# Patient Record
Sex: MALE | Race: WHITE | Employment: OTHER | URBAN - NONMETROPOLITAN AREA
[De-identification: names, ages, dates, MRNs, and addresses within clinical notes are randomized per-mention and may not be internally consistent; named-entity substitution may affect disease eponyms.]

---

## 2017-01-03 ENCOUNTER — OFFICE VISIT (OUTPATIENT)
Dept: FAMILY MEDICINE CLINIC | Age: 67
End: 2017-01-03

## 2017-01-03 VITALS
HEIGHT: 72 IN | WEIGHT: 315 LBS | TEMPERATURE: 98.9 F | SYSTOLIC BLOOD PRESSURE: 128 MMHG | OXYGEN SATURATION: 96 % | HEART RATE: 86 BPM | DIASTOLIC BLOOD PRESSURE: 82 MMHG | BODY MASS INDEX: 42.66 KG/M2

## 2017-01-03 DIAGNOSIS — J06.9 UPPER RESPIRATORY TRACT INFECTION, UNSPECIFIED TYPE: ICD-10-CM

## 2017-01-03 DIAGNOSIS — Z12.11 ENCOUNTER FOR SCREENING FECAL OCCULT BLOOD TESTING: ICD-10-CM

## 2017-01-03 DIAGNOSIS — E66.01 MORBID OBESITY WITH BMI OF 40.0-44.9, ADULT (HCC): ICD-10-CM

## 2017-01-03 DIAGNOSIS — J01.00 ACUTE NON-RECURRENT MAXILLARY SINUSITIS: Primary | ICD-10-CM

## 2017-01-03 LAB
HEMOCCULT STL QL: NEGATIVE

## 2017-01-03 PROCEDURE — 82270 OCCULT BLOOD FECES: CPT | Performed by: FAMILY MEDICINE

## 2017-01-03 PROCEDURE — 99214 OFFICE O/P EST MOD 30 MIN: CPT | Performed by: FAMILY MEDICINE

## 2017-01-03 RX ORDER — BENZONATATE 200 MG/1
200 CAPSULE ORAL 3 TIMES DAILY PRN
Qty: 30 CAPSULE | Refills: 0 | Status: SHIPPED | OUTPATIENT
Start: 2017-01-03 | End: 2017-01-10

## 2017-01-03 RX ORDER — CEFDINIR 300 MG/1
300 CAPSULE ORAL 2 TIMES DAILY
Qty: 20 CAPSULE | Refills: 0 | Status: SHIPPED | OUTPATIENT
Start: 2017-01-03 | End: 2017-03-13 | Stop reason: SDUPTHER

## 2017-01-03 ASSESSMENT — ENCOUNTER SYMPTOMS
VOMITING: 0
SORE THROAT: 1
COUGH: 1
NAUSEA: 1
SINUS PRESSURE: 1

## 2017-01-18 RX ORDER — IBUPROFEN 800 MG/1
800 TABLET ORAL 3 TIMES DAILY
Qty: 120 TABLET | Refills: 1 | Status: SHIPPED | OUTPATIENT
Start: 2017-01-18 | End: 2017-08-29 | Stop reason: SDUPTHER

## 2017-03-13 ENCOUNTER — OFFICE VISIT (OUTPATIENT)
Dept: FAMILY MEDICINE CLINIC | Age: 67
End: 2017-03-13

## 2017-03-13 VITALS
OXYGEN SATURATION: 98 % | BODY MASS INDEX: 41.88 KG/M2 | WEIGHT: 309.2 LBS | HEART RATE: 86 BPM | DIASTOLIC BLOOD PRESSURE: 82 MMHG | SYSTOLIC BLOOD PRESSURE: 138 MMHG | HEIGHT: 72 IN | TEMPERATURE: 98.5 F

## 2017-03-13 DIAGNOSIS — G89.29 CHRONIC MIDLINE LOW BACK PAIN WITHOUT SCIATICA: ICD-10-CM

## 2017-03-13 DIAGNOSIS — J01.00 ACUTE NON-RECURRENT MAXILLARY SINUSITIS: Primary | ICD-10-CM

## 2017-03-13 DIAGNOSIS — M54.50 CHRONIC MIDLINE LOW BACK PAIN WITHOUT SCIATICA: ICD-10-CM

## 2017-03-13 DIAGNOSIS — R07.89 RIGHT-SIDED CHEST WALL PAIN: ICD-10-CM

## 2017-03-13 PROCEDURE — G8427 DOCREV CUR MEDS BY ELIG CLIN: HCPCS | Performed by: FAMILY MEDICINE

## 2017-03-13 PROCEDURE — G8484 FLU IMMUNIZE NO ADMIN: HCPCS | Performed by: FAMILY MEDICINE

## 2017-03-13 PROCEDURE — 1036F TOBACCO NON-USER: CPT | Performed by: FAMILY MEDICINE

## 2017-03-13 PROCEDURE — G8417 CALC BMI ABV UP PARAM F/U: HCPCS | Performed by: FAMILY MEDICINE

## 2017-03-13 PROCEDURE — 99214 OFFICE O/P EST MOD 30 MIN: CPT | Performed by: FAMILY MEDICINE

## 2017-03-13 PROCEDURE — 3017F COLORECTAL CA SCREEN DOC REV: CPT | Performed by: FAMILY MEDICINE

## 2017-03-13 PROCEDURE — 4040F PNEUMOC VAC/ADMIN/RCVD: CPT | Performed by: FAMILY MEDICINE

## 2017-03-13 PROCEDURE — 1123F ACP DISCUSS/DSCN MKR DOCD: CPT | Performed by: FAMILY MEDICINE

## 2017-03-13 RX ORDER — CEFDINIR 300 MG/1
300 CAPSULE ORAL 2 TIMES DAILY
Qty: 20 CAPSULE | Refills: 0 | Status: SHIPPED | OUTPATIENT
Start: 2017-03-13 | End: 2017-03-23

## 2017-03-13 RX ORDER — HYDROCODONE BITARTRATE AND ACETAMINOPHEN 7.5; 325 MG/1; MG/1
1 TABLET ORAL EVERY 6 HOURS PRN
Qty: 30 TABLET | Refills: 0 | Status: SHIPPED | OUTPATIENT
Start: 2017-03-13 | End: 2017-08-29 | Stop reason: SDUPTHER

## 2017-03-13 RX ORDER — DEXTROMETHORPHAN HYDROBROMIDE AND PROMETHAZINE HYDROCHLORIDE 15; 6.25 MG/5ML; MG/5ML
5 SYRUP ORAL 4 TIMES DAILY PRN
Qty: 120 ML | Refills: 0 | Status: SHIPPED | OUTPATIENT
Start: 2017-03-13 | End: 2017-03-20

## 2017-03-13 ASSESSMENT — ENCOUNTER SYMPTOMS
NAUSEA: 1
COUGH: 1
SORE THROAT: 1
SHORTNESS OF BREATH: 1
DIARRHEA: 1

## 2017-08-29 ENCOUNTER — OFFICE VISIT (OUTPATIENT)
Dept: FAMILY MEDICINE CLINIC | Age: 67
End: 2017-08-29

## 2017-08-29 VITALS
OXYGEN SATURATION: 96 % | HEIGHT: 72 IN | HEART RATE: 65 BPM | DIASTOLIC BLOOD PRESSURE: 80 MMHG | WEIGHT: 315 LBS | SYSTOLIC BLOOD PRESSURE: 120 MMHG | BODY MASS INDEX: 42.66 KG/M2

## 2017-08-29 DIAGNOSIS — M25.552 LEFT HIP PAIN: ICD-10-CM

## 2017-08-29 DIAGNOSIS — M79.605 LEFT LEG PAIN: ICD-10-CM

## 2017-08-29 DIAGNOSIS — G89.29 CHRONIC MIDLINE LOW BACK PAIN WITHOUT SCIATICA: ICD-10-CM

## 2017-08-29 DIAGNOSIS — M25.562 LEFT KNEE PAIN, UNSPECIFIED CHRONICITY: Primary | ICD-10-CM

## 2017-08-29 DIAGNOSIS — M54.50 CHRONIC MIDLINE LOW BACK PAIN WITHOUT SCIATICA: ICD-10-CM

## 2017-08-29 PROCEDURE — 1123F ACP DISCUSS/DSCN MKR DOCD: CPT | Performed by: FAMILY MEDICINE

## 2017-08-29 PROCEDURE — G8417 CALC BMI ABV UP PARAM F/U: HCPCS | Performed by: FAMILY MEDICINE

## 2017-08-29 PROCEDURE — 4040F PNEUMOC VAC/ADMIN/RCVD: CPT | Performed by: FAMILY MEDICINE

## 2017-08-29 PROCEDURE — 1036F TOBACCO NON-USER: CPT | Performed by: FAMILY MEDICINE

## 2017-08-29 PROCEDURE — 99213 OFFICE O/P EST LOW 20 MIN: CPT | Performed by: FAMILY MEDICINE

## 2017-08-29 PROCEDURE — 3017F COLORECTAL CA SCREEN DOC REV: CPT | Performed by: FAMILY MEDICINE

## 2017-08-29 PROCEDURE — G8427 DOCREV CUR MEDS BY ELIG CLIN: HCPCS | Performed by: FAMILY MEDICINE

## 2017-08-29 RX ORDER — IBUPROFEN 800 MG/1
800 TABLET ORAL 3 TIMES DAILY
Qty: 270 TABLET | Refills: 1 | Status: SHIPPED | OUTPATIENT
Start: 2017-08-29 | End: 2018-09-19

## 2017-08-29 RX ORDER — HYDROCODONE BITARTRATE AND ACETAMINOPHEN 7.5; 325 MG/1; MG/1
1 TABLET ORAL EVERY 6 HOURS PRN
Qty: 30 TABLET | Refills: 0 | Status: SHIPPED | OUTPATIENT
Start: 2017-08-29 | End: 2017-09-05

## 2017-08-29 ASSESSMENT — ENCOUNTER SYMPTOMS: SHORTNESS OF BREATH: 0

## 2017-09-13 DIAGNOSIS — R59.9 ENLARGED LYMPH NODE: Primary | ICD-10-CM

## 2017-09-15 DIAGNOSIS — R59.9 ENLARGEMENT OF LYMPH NODES: Primary | ICD-10-CM

## 2017-09-20 LAB
ANION GAP SERPL CALCULATED.3IONS-SCNC: 12.1 MMOL/L (ref 8–16)
BUN BLDV-MCNC: 19 MG/DL (ref 5–19)
CALCIUM SERPL-MCNC: 8.8 MG/DL (ref 8.4–10.2)
CHLORIDE BLD-SCNC: 108 MMOL/L (ref 99–111)
CO2: 30 MMOL/L (ref 21–33)
CREAT SERPL-MCNC: 1 MG/DL (ref 0.6–1.3)
GFR CALCULATED: 74
GLUCOSE BLD-MCNC: 109 MG/DL (ref 74–99)
POTASSIUM SERPL-SCNC: 4.9 MMOL/L (ref 3.4–5)
SODIUM BLD-SCNC: 145 MMOL/L (ref 136–146)

## 2017-09-21 DIAGNOSIS — R59.9 ENLARGED LYMPH NODE: Primary | ICD-10-CM

## 2018-01-02 ENCOUNTER — TELEPHONE (OUTPATIENT)
Dept: FAMILY MEDICINE CLINIC | Age: 68
End: 2018-01-02

## 2018-01-02 DIAGNOSIS — R05.9 COUGH IN ADULT: Primary | ICD-10-CM

## 2018-01-02 RX ORDER — BENZONATATE 200 MG/1
200 CAPSULE ORAL 3 TIMES DAILY PRN
Qty: 30 CAPSULE | Refills: 0 | Status: SHIPPED | OUTPATIENT
Start: 2018-01-02 | End: 2018-01-09

## 2018-01-02 RX ORDER — CEFDINIR 300 MG/1
300 CAPSULE ORAL 2 TIMES DAILY
Qty: 20 CAPSULE | Refills: 0 | Status: SHIPPED | OUTPATIENT
Start: 2018-01-02 | End: 2018-01-12

## 2018-01-02 NOTE — TELEPHONE ENCOUNTER
Pt wife calling stated pt w/ st, cough, congestion, rattle in chest and some sob x3 days, no fever, wanting to know if something can be sent to [facility]

## 2018-01-08 ENCOUNTER — OFFICE VISIT (OUTPATIENT)
Dept: FAMILY MEDICINE CLINIC | Age: 68
End: 2018-01-08

## 2018-01-08 VITALS
DIASTOLIC BLOOD PRESSURE: 80 MMHG | HEART RATE: 84 BPM | TEMPERATURE: 97.6 F | HEIGHT: 72 IN | WEIGHT: 315 LBS | SYSTOLIC BLOOD PRESSURE: 138 MMHG | OXYGEN SATURATION: 96 % | BODY MASS INDEX: 42.66 KG/M2

## 2018-01-08 DIAGNOSIS — R07.81 RIB PAIN ON LEFT SIDE: ICD-10-CM

## 2018-01-08 DIAGNOSIS — J40 BRONCHITIS: Primary | ICD-10-CM

## 2018-01-08 PROCEDURE — 99214 OFFICE O/P EST MOD 30 MIN: CPT | Performed by: FAMILY MEDICINE

## 2018-01-08 RX ORDER — AZITHROMYCIN 250 MG/1
TABLET, FILM COATED ORAL
Qty: 1 PACKET | Refills: 0 | Status: SHIPPED | OUTPATIENT
Start: 2018-01-08 | End: 2018-01-18

## 2018-01-08 RX ORDER — DEXTROMETHORPHAN HYDROBROMIDE AND PROMETHAZINE HYDROCHLORIDE 15; 6.25 MG/5ML; MG/5ML
5 SYRUP ORAL 4 TIMES DAILY PRN
Qty: 120 ML | Refills: 0 | Status: SHIPPED | OUTPATIENT
Start: 2018-01-08 | End: 2018-01-15

## 2018-01-08 RX ORDER — PREDNISONE 20 MG/1
40 TABLET ORAL DAILY
Qty: 10 TABLET | Refills: 0 | Status: SHIPPED | OUTPATIENT
Start: 2018-01-08 | End: 2018-01-13

## 2018-01-08 RX ORDER — IBUPROFEN 800 MG/1
800 TABLET ORAL 3 TIMES DAILY
Qty: 270 TABLET | Refills: 1 | Status: CANCELLED | OUTPATIENT
Start: 2018-01-08 | End: 2018-04-08

## 2018-01-08 ASSESSMENT — ENCOUNTER SYMPTOMS
WHEEZING: 1
SHORTNESS OF BREATH: 1
COUGH: 1

## 2018-01-08 NOTE — PROGRESS NOTES
Subjective:      Patient ID: Ani Agee is a 69 y.o. male. Chief Complaint   Patient presents with    Cough    Congestion       Cough   This is a new problem. The current episode started 1 to 4 weeks ago. The problem has been unchanged. The cough is productive of sputum. Associated symptoms include nasal congestion, postnasal drip, shortness of breath and wheezing. Pertinent negatives include no fever (no recent fevers). The symptoms are aggravated by lying down. He has tried prescription cough suppressant for the symptoms. The treatment provided mild relief. has been on omnicef. Not better. No sig relief w/ cough pearles. Review of Systems   Constitutional: Negative for fever (no recent fevers). HENT: Positive for congestion and postnasal drip. Respiratory: Positive for cough, shortness of breath and wheezing. Objective:   Physical Exam   Constitutional: He is oriented to person, place, and time. He appears well-developed and well-nourished. HENT:   Head: Normocephalic and atraumatic. Right Ear: Hearing normal.   Left Ear: Hearing normal.   Mouth/Throat: Uvula is midline, oropharynx is clear and moist and mucous membranes are normal.   Eyes: EOM are normal.   Cardiovascular: Normal rate and regular rhythm. Exam reveals no gallop and no friction rub. No murmur heard. Pulmonary/Chest: Effort normal. No respiratory distress. He has wheezes. Abdominal: Soft. There is no tenderness. Lymphadenopathy:     He has no cervical adenopathy. Neurological: He is alert and oriented to person, place, and time. Skin: Skin is warm and dry. No rash noted. Psychiatric: He has a normal mood and affect. /80   Pulse 84   Temp 97.6 °F (36.4 °C) (Tympanic)   Ht 6' (1.829 m)   Wt (!) 324 lb 3.2 oz (147.1 kg)   SpO2 96%   BMI 43.97 kg/m²    Assessment/Plan   1. Bronchitis  Ongoing issues. Add meds as below to help w/ bronchospasm and cover for atypical bacteria.   If not

## 2018-05-24 DIAGNOSIS — Z12.11 SCREEN FOR COLON CANCER: Primary | ICD-10-CM

## 2018-06-07 ENCOUNTER — NURSE ONLY (OUTPATIENT)
Dept: FAMILY MEDICINE CLINIC | Age: 68
End: 2018-06-07

## 2018-06-07 DIAGNOSIS — Z12.11 SCREEN FOR COLON CANCER: ICD-10-CM

## 2018-06-07 LAB
CONTROL: PRESENT
HEMOCCULT STL QL: POSITIVE

## 2018-06-07 PROCEDURE — 82274 ASSAY TEST FOR BLOOD FECAL: CPT | Performed by: FAMILY MEDICINE

## 2018-06-08 DIAGNOSIS — Z12.11 ENCOUNTER FOR SCREENING COLONOSCOPY: Primary | ICD-10-CM

## 2018-06-18 ENCOUNTER — OFFICE VISIT (OUTPATIENT)
Dept: FAMILY MEDICINE CLINIC | Age: 68
End: 2018-06-18

## 2018-06-18 VITALS
DIASTOLIC BLOOD PRESSURE: 76 MMHG | HEIGHT: 72 IN | BODY MASS INDEX: 42.66 KG/M2 | SYSTOLIC BLOOD PRESSURE: 132 MMHG | HEART RATE: 95 BPM | OXYGEN SATURATION: 96 % | WEIGHT: 315 LBS

## 2018-06-18 DIAGNOSIS — N48.1 BALANITIS: Primary | ICD-10-CM

## 2018-06-18 DIAGNOSIS — N48.9 PENILE LESION: ICD-10-CM

## 2018-06-18 PROCEDURE — 99213 OFFICE O/P EST LOW 20 MIN: CPT | Performed by: FAMILY MEDICINE

## 2018-06-18 RX ORDER — KETOCONAZOLE 20 MG/G
CREAM TOPICAL
Qty: 30 G | Refills: 1 | Status: SHIPPED | OUTPATIENT
Start: 2018-06-18 | End: 2018-07-30

## 2018-06-18 ASSESSMENT — ENCOUNTER SYMPTOMS
CHEST TIGHTNESS: 0
CONSTIPATION: 0
DIARRHEA: 0
SHORTNESS OF BREATH: 1

## 2018-06-25 ENCOUNTER — OFFICE VISIT (OUTPATIENT)
Dept: FAMILY MEDICINE CLINIC | Age: 68
End: 2018-06-25

## 2018-06-25 VITALS
OXYGEN SATURATION: 96 % | WEIGHT: 315 LBS | SYSTOLIC BLOOD PRESSURE: 122 MMHG | DIASTOLIC BLOOD PRESSURE: 80 MMHG | BODY MASS INDEX: 42.66 KG/M2 | HEART RATE: 114 BPM | HEIGHT: 72 IN

## 2018-06-25 DIAGNOSIS — N48.9 PENILE LESION: ICD-10-CM

## 2018-06-25 DIAGNOSIS — I48.91 ATRIAL FIBRILLATION, UNSPECIFIED TYPE (HCC): ICD-10-CM

## 2018-06-25 DIAGNOSIS — G44.89 OTHER HEADACHE SYNDROME: ICD-10-CM

## 2018-06-25 DIAGNOSIS — I49.9 IRREGULAR HEART RHYTHM: Primary | ICD-10-CM

## 2018-06-25 DIAGNOSIS — G47.33 OBSTRUCTIVE SLEEP APNEA SYNDROME: ICD-10-CM

## 2018-06-25 LAB
A/G RATIO: 1.5 (ref 1.1–2.2)
ALBUMIN SERPL-MCNC: 4 G/DL (ref 3.4–5)
ALP BLD-CCNC: 76 U/L (ref 40–129)
ALT SERPL-CCNC: 30 U/L (ref 10–40)
ANION GAP SERPL CALCULATED.3IONS-SCNC: 17 MMOL/L (ref 3–16)
AST SERPL-CCNC: 22 U/L (ref 15–37)
BASOPHILS ABSOLUTE: 0 K/UL (ref 0–0.2)
BASOPHILS RELATIVE PERCENT: 0.7 %
BILIRUB SERPL-MCNC: 0.6 MG/DL (ref 0–1)
BUN BLDV-MCNC: 18 MG/DL (ref 7–20)
CALCIUM SERPL-MCNC: 8.6 MG/DL (ref 8.3–10.6)
CHLORIDE BLD-SCNC: 103 MMOL/L (ref 99–110)
CO2: 27 MMOL/L (ref 21–32)
CREAT SERPL-MCNC: 0.9 MG/DL (ref 0.8–1.3)
EOSINOPHILS ABSOLUTE: 0.1 K/UL (ref 0–0.6)
EOSINOPHILS RELATIVE PERCENT: 2 %
GFR AFRICAN AMERICAN: >60
GFR NON-AFRICAN AMERICAN: >60
GLOBULIN: 2.7 G/DL
GLUCOSE BLD-MCNC: 101 MG/DL (ref 70–99)
HCT VFR BLD CALC: 41.7 % (ref 40.5–52.5)
HEMOGLOBIN: 14.4 G/DL (ref 13.5–17.5)
LYMPHOCYTES ABSOLUTE: 1.6 K/UL (ref 1–5.1)
LYMPHOCYTES RELATIVE PERCENT: 26.1 %
MCH RBC QN AUTO: 31.2 PG (ref 26–34)
MCHC RBC AUTO-ENTMCNC: 34.6 G/DL (ref 31–36)
MCV RBC AUTO: 90.3 FL (ref 80–100)
MONOCYTES ABSOLUTE: 0.4 K/UL (ref 0–1.3)
MONOCYTES RELATIVE PERCENT: 5.8 %
NEUTROPHILS ABSOLUTE: 4 K/UL (ref 1.7–7.7)
NEUTROPHILS RELATIVE PERCENT: 65.4 %
PDW BLD-RTO: 15 % (ref 12.4–15.4)
PLATELET # BLD: 179 K/UL (ref 135–450)
PMV BLD AUTO: 7.6 FL (ref 5–10.5)
POTASSIUM SERPL-SCNC: 3.7 MMOL/L (ref 3.5–5.1)
RBC # BLD: 4.61 M/UL (ref 4.2–5.9)
SODIUM BLD-SCNC: 147 MMOL/L (ref 136–145)
T4 FREE: 1.3 NG/DL (ref 0.9–1.8)
TOTAL PROTEIN: 6.7 G/DL (ref 6.4–8.2)
TSH SERPL DL<=0.05 MIU/L-ACNC: 0.71 UIU/ML (ref 0.27–4.2)
WBC # BLD: 6.1 K/UL (ref 4–11)

## 2018-06-25 PROCEDURE — 93000 ELECTROCARDIOGRAM COMPLETE: CPT | Performed by: FAMILY MEDICINE

## 2018-06-25 PROCEDURE — 99213 OFFICE O/P EST LOW 20 MIN: CPT | Performed by: FAMILY MEDICINE

## 2018-06-25 PROCEDURE — 36415 COLL VENOUS BLD VENIPUNCTURE: CPT | Performed by: FAMILY MEDICINE

## 2018-06-25 ASSESSMENT — ENCOUNTER SYMPTOMS
DIARRHEA: 0
SHORTNESS OF BREATH: 0
BACK PAIN: 1
NAUSEA: 1
VOMITING: 0

## 2018-06-27 ENCOUNTER — OFFICE VISIT (OUTPATIENT)
Dept: CARDIOLOGY CLINIC | Age: 68
End: 2018-06-27

## 2018-06-27 VITALS
DIASTOLIC BLOOD PRESSURE: 74 MMHG | BODY MASS INDEX: 43.4 KG/M2 | HEART RATE: 87 BPM | WEIGHT: 315 LBS | OXYGEN SATURATION: 96 % | SYSTOLIC BLOOD PRESSURE: 126 MMHG

## 2018-06-27 DIAGNOSIS — I48.0 PAROXYSMAL ATRIAL FIBRILLATION (HCC): Primary | ICD-10-CM

## 2018-06-27 PROCEDURE — 99204 OFFICE O/P NEW MOD 45 MIN: CPT | Performed by: INTERNAL MEDICINE

## 2018-06-28 ENCOUNTER — TELEPHONE (OUTPATIENT)
Dept: CARDIOLOGY CLINIC | Age: 68
End: 2018-06-28

## 2018-07-17 ENCOUNTER — HOSPITAL ENCOUNTER (OUTPATIENT)
Dept: NON INVASIVE DIAGNOSTICS | Age: 68
Discharge: HOME | End: 2018-07-17
Payer: COMMERCIAL

## 2018-07-17 LAB
LV EF: 53 %
LVEF MODALITY: NORMAL

## 2018-07-17 PROCEDURE — 93306 TTE W/DOPPLER COMPLETE: CPT

## 2018-07-17 PROCEDURE — 93350 STRESS TTE ONLY: CPT

## 2018-07-30 ENCOUNTER — HOSPITAL ENCOUNTER (EMERGENCY)
Age: 68
Discharge: HOME | End: 2018-07-30
Attending: EMERGENCY MEDICINE
Payer: COMMERCIAL

## 2018-07-30 ENCOUNTER — APPOINTMENT (OUTPATIENT)
Dept: CT IMAGING | Age: 68
End: 2018-07-30
Payer: COMMERCIAL

## 2018-07-30 VITALS
SYSTOLIC BLOOD PRESSURE: 133 MMHG | HEIGHT: 72 IN | WEIGHT: 315 LBS | RESPIRATION RATE: 16 BRPM | HEART RATE: 78 BPM | BODY MASS INDEX: 42.66 KG/M2 | OXYGEN SATURATION: 98 % | DIASTOLIC BLOOD PRESSURE: 83 MMHG | TEMPERATURE: 98.1 F

## 2018-07-30 DIAGNOSIS — N23 RENAL COLIC: ICD-10-CM

## 2018-07-30 DIAGNOSIS — N20.0 KIDNEY STONE: ICD-10-CM

## 2018-07-30 DIAGNOSIS — N13.2 URETERAL STONE WITH HYDRONEPHROSIS: Primary | ICD-10-CM

## 2018-07-30 LAB
A/G RATIO: 1.3 (ref 1.1–2.2)
ALBUMIN SERPL-MCNC: 3.9 G/DL (ref 3.4–5)
ALP BLD-CCNC: 65 U/L (ref 40–129)
ALT SERPL-CCNC: 39 U/L (ref 10–40)
ANION GAP SERPL CALCULATED.3IONS-SCNC: 10 MMOL/L (ref 3–16)
AST SERPL-CCNC: 29 U/L (ref 15–37)
BASOPHILS ABSOLUTE: 0 K/UL (ref 0–0.2)
BASOPHILS RELATIVE PERCENT: 0.5 %
BILIRUB SERPL-MCNC: 0.6 MG/DL (ref 0–1)
BILIRUBIN URINE: NEGATIVE
BLOOD, URINE: ABNORMAL
BUN BLDV-MCNC: 14 MG/DL (ref 7–20)
CALCIUM SERPL-MCNC: 8.8 MG/DL (ref 8.3–10.6)
CHLORIDE BLD-SCNC: 109 MMOL/L (ref 99–110)
CLARITY: CLEAR
CO2: 23 MMOL/L (ref 21–32)
COLOR: YELLOW
CREAT SERPL-MCNC: 0.8 MG/DL (ref 0.8–1.3)
EOSINOPHILS ABSOLUTE: 0.1 K/UL (ref 0–0.6)
EOSINOPHILS RELATIVE PERCENT: 1 %
GFR AFRICAN AMERICAN: >60
GFR NON-AFRICAN AMERICAN: >60
GLOBULIN: 3 G/DL
GLUCOSE BLD-MCNC: 103 MG/DL (ref 70–99)
GLUCOSE URINE: NEGATIVE MG/DL
HCT VFR BLD CALC: 40.7 % (ref 40.5–52.5)
HEMOGLOBIN: 14.1 G/DL (ref 13.5–17.5)
INR BLD: 1.37 (ref 0.86–1.14)
KETONES, URINE: NEGATIVE MG/DL
LEUKOCYTE ESTERASE, URINE: NEGATIVE
LYMPHOCYTES ABSOLUTE: 1.6 K/UL (ref 1–5.1)
LYMPHOCYTES RELATIVE PERCENT: 23.9 %
MCH RBC QN AUTO: 31.4 PG (ref 26–34)
MCHC RBC AUTO-ENTMCNC: 34.7 G/DL (ref 31–36)
MCV RBC AUTO: 90.5 FL (ref 80–100)
MICROSCOPIC EXAMINATION: YES
MONOCYTES ABSOLUTE: 0.5 K/UL (ref 0–1.3)
MONOCYTES RELATIVE PERCENT: 6.8 %
NEUTROPHILS ABSOLUTE: 4.7 K/UL (ref 1.7–7.7)
NEUTROPHILS RELATIVE PERCENT: 67.8 %
NITRITE, URINE: NEGATIVE
PDW BLD-RTO: 15.1 % (ref 12.4–15.4)
PH UA: 6
PLATELET # BLD: 144 K/UL (ref 135–450)
PMV BLD AUTO: 6.5 FL (ref 5–10.5)
POTASSIUM SERPL-SCNC: 3.8 MMOL/L (ref 3.5–5.1)
PROTEIN UA: NEGATIVE MG/DL
PROTHROMBIN TIME: 15.6 SEC (ref 9.8–13)
RBC # BLD: 4.5 M/UL (ref 4.2–5.9)
RBC UA: ABNORMAL /HPF (ref 0–2)
SODIUM BLD-SCNC: 142 MMOL/L (ref 136–145)
SPECIFIC GRAVITY UA: 1.01
TOTAL PROTEIN: 6.9 G/DL (ref 6.4–8.2)
URINE REFLEX TO CULTURE: ABNORMAL
URINE TYPE: ABNORMAL
UROBILINOGEN, URINE: 0.2 E.U./DL
WBC # BLD: 6.9 K/UL (ref 4–11)
WBC UA: ABNORMAL /HPF (ref 0–5)

## 2018-07-30 PROCEDURE — 85610 PROTHROMBIN TIME: CPT

## 2018-07-30 PROCEDURE — 85025 COMPLETE CBC W/AUTO DIFF WBC: CPT

## 2018-07-30 PROCEDURE — 81001 URINALYSIS AUTO W/SCOPE: CPT

## 2018-07-30 PROCEDURE — 74176 CT ABD & PELVIS W/O CONTRAST: CPT

## 2018-07-30 PROCEDURE — 99284 EMERGENCY DEPT VISIT MOD MDM: CPT

## 2018-07-30 PROCEDURE — 80053 COMPREHEN METABOLIC PANEL: CPT

## 2018-07-30 RX ORDER — ONDANSETRON 4 MG/1
4 TABLET, FILM COATED ORAL EVERY 8 HOURS PRN
Qty: 20 TABLET | Refills: 0 | Status: SHIPPED | OUTPATIENT
Start: 2018-07-30 | End: 2018-08-16

## 2018-07-30 RX ORDER — HYDROCODONE BITARTRATE AND ACETAMINOPHEN 5; 325 MG/1; MG/1
1 TABLET ORAL EVERY 6 HOURS PRN
Qty: 10 TABLET | Refills: 0 | Status: SHIPPED | OUTPATIENT
Start: 2018-07-30 | End: 2018-08-06

## 2018-07-30 RX ORDER — TAMSULOSIN HYDROCHLORIDE 0.4 MG/1
0.4 CAPSULE ORAL DAILY
Qty: 5 CAPSULE | Refills: 0 | Status: SHIPPED | OUTPATIENT
Start: 2018-07-30 | End: 2018-09-19

## 2018-07-30 ASSESSMENT — ENCOUNTER SYMPTOMS
SORE THROAT: 0
FACIAL SWELLING: 0
RHINORRHEA: 0
NAUSEA: 1
SHORTNESS OF BREATH: 0
BACK PAIN: 0
EYE REDNESS: 0
DIARRHEA: 0
ABDOMINAL PAIN: 0
COUGH: 0
EYE PAIN: 0
CONSTIPATION: 0
CHEST TIGHTNESS: 0

## 2018-07-30 ASSESSMENT — PAIN DESCRIPTION - LOCATION: LOCATION: FLANK

## 2018-07-30 ASSESSMENT — PAIN SCALES - GENERAL: PAINLEVEL_OUTOF10: 2

## 2018-07-30 ASSESSMENT — PAIN DESCRIPTION - ORIENTATION: ORIENTATION: LEFT

## 2018-07-30 ASSESSMENT — PAIN DESCRIPTION - PAIN TYPE: TYPE: ACUTE PAIN

## 2018-07-30 NOTE — ED PROVIDER NOTES
pain and hematuria. At presentation, vital signs were stable. The patient was in no acute distress. Physical Exam findings were as noted above. Labs were drawn which showed no acute abnormalities. CT images of abdomen and pelvis were ordered and interpreted by radiology. This showed A 3 mm obstructing ureteral stone with mild hydronephrosis. I discussed these results with the patient and he/she understood    He was offered pain management and nausea management however, at the time of interview he was not in any acute pain or nausea. Therefore, the patient declined. Urinalysis revealed large red blood without concern for infection    I sent the patient that he will likely pass the stone without complications. His pain is under control at this time and therefore I believe he is a good candidate to be discharged home. This patient will be discharged home with the medications listed below. I counseled on how to take these medicines and risks/benefits and AEs. The patient was agreeable to the prescriptions. He/She will follow up with primary care provider or present back for worsening symptoms. I estimate there is LOW risk for ACUTE APPENDICITIS, BOWEL OBSTRUCTION, CHOLECYSTITIS, DIVERTICULITIS, INCARCERATED HERNIA, PANCREATITIS, PERFORATED BOWEL, BOWEL ISCHEMIA, GONADAL TORSION, OR CARDIAC ISCHEMIA, thus I consider the discharge disposition reasonable. Also, there is no evidence or peritonitis, sepsis, or toxicity. I discussed this patient with the attending physician. They performed an independent exam of the the patient and agreed with my assessment and plan. The patient tolerated their visit well. They were seen and evaluated by the attending physician who agreed with the assessment and plan. The patient and / or the family were informed of the results of any tests, a time was given to answer questions, a plan was proposed and they agreed with plan.         FINAL IMPRESSION

## 2018-07-31 ENCOUNTER — OFFICE VISIT (OUTPATIENT)
Dept: CARDIOLOGY CLINIC | Age: 68
End: 2018-07-31

## 2018-07-31 VITALS
OXYGEN SATURATION: 95 % | HEART RATE: 102 BPM | DIASTOLIC BLOOD PRESSURE: 86 MMHG | BODY MASS INDEX: 43.54 KG/M2 | WEIGHT: 315 LBS | SYSTOLIC BLOOD PRESSURE: 138 MMHG

## 2018-07-31 DIAGNOSIS — I48.19 PERSISTENT ATRIAL FIBRILLATION (HCC): Primary | ICD-10-CM

## 2018-07-31 PROCEDURE — 99214 OFFICE O/P EST MOD 30 MIN: CPT | Performed by: NURSE PRACTITIONER

## 2018-07-31 NOTE — PROGRESS NOTES
[facility]   Electrophysiology  Date: 7/31/2018    Chief Complaint   Patient presents with    Atrial Fibrillation       Cardiac HX: Pratik Carranza is a 77 y.o. man with a h/o SAM, obesity, new onset AFin the presence of food poisoning with diarrhea and ongoing fatigue. Since that time he has noticed ongoing fatigue, no energy, activity limitations. He has no appetite and his weight has gone up a couple of pounds. Today: Patient is here for follow-up. He remains in AF. HR controlled in the 90's. Still remains with SOB, fatigue, activity intolerance. He is dealing with a urolithiasis at this time. Home medications:   Current Outpatient Prescriptions on File Prior to Visit   Medication Sig Dispense Refill    HYDROcodone-acetaminophen (NORCO) 5-325 MG per tablet Take 1 tablet by mouth every 6 hours as needed for Pain for up to 7 days. . 10 tablet 0    tamsulosin (FLOMAX) 0.4 MG capsule Take 1 capsule by mouth daily for 5 doses 5 capsule 0    rivaroxaban (XARELTO) 20 MG TABS tablet Take 1 tablet by mouth daily (with breakfast) 90 tablet 3    RaNITidine HCl (RANITIDINE ACID REDUCER PO) Take by mouth      Ascorbic Acid (VITAMIN C PO) Take by mouth daily      Cyanocobalamin (VITAMIN B 12 PO) Take by mouth daily       vitamin D (CHOLECALCIFEROL) 1000 UNIT TABS tablet Take 1,000 Units by mouth daily.  Multiple Vitamins-Minerals (CENTRUM ADULTS PO) Take  by mouth.  ondansetron (ZOFRAN) 4 MG tablet Take 1 tablet by mouth every 8 hours as needed for Nausea 20 tablet 0    ibuprofen (ADVIL;MOTRIN) 800 MG tablet Take 1 tablet by mouth 3 times daily 270 tablet 1     No current facility-administered medications on file prior to visit.         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 Problem List:   Patient Active Problem List    Diagnosis Date Noted    Renal colic on right side 35/25/3608     Priority: Low    Morbid obesity with BMI of 40.0-44.9, adult ([facility] 10/06/2015     Priority: Low    Back pain 06/25/2015     Priority: Low    Kidney stone      Priority: Low    Obesity      Priority: Low    Organic sleep apnea      Priority: Low        Assessment:   1. Persistent atrial fibrillation ([facility]        Cardiac HX: Juan Woodall is a 77 y.o. man with a h/o SAM, obesity, new onset AFin the presence of food poisoning with diarrhea and ongoing fatigue. Since that time he has noticed ongoing fatigue, no energy, activity limitations. He has no appetite and his weight has gone up a couple of pounds. [id number] 1. TSH 0.71 (6/25/2018). Persistent AF  - In AF - HR controlled  - On Xarelto - uninterrupted  - Sleep study - patient not interested at this point  - Will schedule for a DCCV  - ECG ordered and results personally reviewed     Total time of visit was 35 minutes. Greater than 50% of visit was spent on coordination of care and counseling the patient and family on treatment options of atrial fibrillation and the side effects of medications. EF of 80-16%  No systolic HF  No known CAD  Anticoagulation for AF   No Tobacco use. All questions and concerns were addressed to the patient/family. Alternatives to my treatment were discussed. The note was completed using EMR. Every effort was made to ensure accuracy; however, inadvertent computerized transcription errors may be present. Patient received education regarding their diagnosis, treatment and medications while in the office today.       Juli Montanez [address]

## 2018-08-01 ENCOUNTER — NURSE ONLY (OUTPATIENT)
Dept: CARDIOLOGY CLINIC | Age: 68
End: 2018-08-01

## 2018-08-16 ENCOUNTER — HOSPITAL ENCOUNTER (OUTPATIENT)
Dept: CARDIAC CATH/INVASIVE PROCEDURES | Age: 68
Discharge: HOME | End: 2018-08-18
Payer: COMMERCIAL

## 2018-08-16 VITALS — TEMPERATURE: 97 F | WEIGHT: 314 LBS | BODY MASS INDEX: 43.96 KG/M2 | HEIGHT: 71 IN

## 2018-08-16 LAB
A/G RATIO: 1.3 (ref 1.1–2.2)
ALBUMIN SERPL-MCNC: 4 G/DL (ref 3.4–5)
ALP BLD-CCNC: 68 U/L (ref 40–129)
ALT SERPL-CCNC: 22 U/L (ref 10–40)
ANION GAP SERPL CALCULATED.3IONS-SCNC: 8 MMOL/L (ref 3–16)
AST SERPL-CCNC: 25 U/L (ref 15–37)
BILIRUB SERPL-MCNC: 0.7 MG/DL (ref 0–1)
BUN BLDV-MCNC: 18 MG/DL (ref 7–20)
CALCIUM SERPL-MCNC: 9 MG/DL (ref 8.3–10.6)
CHLORIDE BLD-SCNC: 103 MMOL/L (ref 99–110)
CO2: 27 MMOL/L (ref 21–32)
CREAT SERPL-MCNC: 0.7 MG/DL (ref 0.8–1.3)
EKG ATRIAL RATE: 102 BPM
EKG ATRIAL RATE: 75 BPM
EKG DIAGNOSIS: NORMAL
EKG DIAGNOSIS: NORMAL
EKG P AXIS: 53 DEGREES
EKG P-R INTERVAL: 196 MS
EKG Q-T INTERVAL: 370 MS
EKG Q-T INTERVAL: 402 MS
EKG QRS DURATION: 82 MS
EKG QRS DURATION: 82 MS
EKG QTC CALCULATION (BAZETT): 448 MS
EKG QTC CALCULATION (BAZETT): 467 MS
EKG R AXIS: 24 DEGREES
EKG R AXIS: 26 DEGREES
EKG T AXIS: 31 DEGREES
EKG T AXIS: 40 DEGREES
EKG VENTRICULAR RATE: 75 BPM
EKG VENTRICULAR RATE: 96 BPM
GFR AFRICAN AMERICAN: >60
GFR NON-AFRICAN AMERICAN: >60
GLOBULIN: 3 G/DL
GLUCOSE BLD-MCNC: 95 MG/DL (ref 70–99)
HCT VFR BLD CALC: 40.3 % (ref 40.5–52.5)
HEMOGLOBIN: 13.7 G/DL (ref 13.5–17.5)
INR BLD: 1.28 (ref 0.86–1.14)
MCH RBC QN AUTO: 31.1 PG (ref 26–34)
MCHC RBC AUTO-ENTMCNC: 34.1 G/DL (ref 31–36)
MCV RBC AUTO: 91.1 FL (ref 80–100)
PDW BLD-RTO: 15.1 % (ref 12.4–15.4)
PLATELET # BLD: 141 K/UL (ref 135–450)
PMV BLD AUTO: 6.6 FL (ref 5–10.5)
POTASSIUM REFLEX MAGNESIUM: 4.5 MMOL/L (ref 3.5–5.1)
PROTHROMBIN TIME: 14.6 SEC (ref 9.8–13)
RBC # BLD: 4.42 M/UL (ref 4.2–5.9)
SODIUM BLD-SCNC: 138 MMOL/L (ref 136–145)
TOTAL PROTEIN: 7 G/DL (ref 6.4–8.2)
WBC # BLD: 5 K/UL (ref 4–11)

## 2018-08-16 PROCEDURE — 7100000010 HC PHASE II RECOVERY - FIRST 15 MIN

## 2018-08-16 PROCEDURE — 94761 N-INVAS EAR/PLS OXIMETRY MLT: CPT

## 2018-08-16 PROCEDURE — 85027 COMPLETE CBC AUTOMATED: CPT

## 2018-08-16 PROCEDURE — 94770 HC ETCO2 MONITOR DAILY: CPT

## 2018-08-16 PROCEDURE — 93005 ELECTROCARDIOGRAM TRACING: CPT | Performed by: FAMILY MEDICINE

## 2018-08-16 PROCEDURE — 93010 ELECTROCARDIOGRAM REPORT: CPT | Performed by: INTERNAL MEDICINE

## 2018-08-16 PROCEDURE — 80053 COMPREHEN METABOLIC PANEL: CPT

## 2018-08-16 PROCEDURE — 92960 CARDIOVERSION ELECTRIC EXT: CPT

## 2018-08-16 PROCEDURE — 92960 CARDIOVERSION ELECTRIC EXT: CPT | Performed by: INTERNAL MEDICINE

## 2018-08-16 PROCEDURE — 85610 PROTHROMBIN TIME: CPT

## 2018-08-16 PROCEDURE — 93005 ELECTROCARDIOGRAM TRACING: CPT | Performed by: INTERNAL MEDICINE

## 2018-08-16 RX ORDER — MIDAZOLAM HYDROCHLORIDE 1 MG/ML
1 INJECTION INTRAMUSCULAR; INTRAVENOUS ONCE
Status: DISCONTINUED | OUTPATIENT
Start: 2018-08-16 | End: 2018-08-19 | Stop reason: HOSPADM

## 2018-08-16 RX ORDER — SODIUM CHLORIDE 0.9 % (FLUSH) 0.9 %
10 SYRINGE (ML) INJECTION EVERY 12 HOURS SCHEDULED
Status: DISCONTINUED | OUTPATIENT
Start: 2018-08-16 | End: 2018-08-19 | Stop reason: HOSPADM

## 2018-08-16 RX ORDER — OXYCODONE AND ACETAMINOPHEN 7.5; 325 MG/1; MG/1
1 TABLET ORAL EVERY 6 HOURS PRN
COMMUNITY
End: 2019-02-05 | Stop reason: ALTCHOICE

## 2018-08-16 RX ORDER — SODIUM CHLORIDE 0.9 % (FLUSH) 0.9 %
10 SYRINGE (ML) INJECTION EVERY 12 HOURS SCHEDULED
Status: DISCONTINUED | OUTPATIENT
Start: 2018-08-16 | End: 2018-08-16 | Stop reason: SDUPTHER

## 2018-08-16 RX ORDER — SODIUM CHLORIDE 9 MG/ML
INJECTION, SOLUTION INTRAVENOUS CONTINUOUS
Status: DISCONTINUED | OUTPATIENT
Start: 2018-08-16 | End: 2018-08-19 | Stop reason: HOSPADM

## 2018-08-16 RX ORDER — SODIUM CHLORIDE 0.9 % (FLUSH) 0.9 %
10 SYRINGE (ML) INJECTION PRN
Status: DISCONTINUED | OUTPATIENT
Start: 2018-08-16 | End: 2018-08-19 | Stop reason: HOSPADM

## 2018-08-16 NOTE — H&P
HPI:  Harish Torre is a 69 y.o. male referred by PCP, Dr. Zack Benito, for new onset AF. Has h/o obesity and untreated SAM, no cardiac history. ECG from 6/25/18 confirmed AF with controlled rate of 88 bpm.  He had been dealing with food poisoning with diarrhea for the past week and complains of ongoing fatigue. He denies any palpitations. Not taking any cardiac meds. Thyroid panel (6/25/18) normal.  He has been diagnosed with SAM, but cannot tolerate the CPAP. He has been sleeping in a recliner.     He is not aware that he is in AF. He becomes SOB with one flight of stairs and with longer distance walking. Otherwise, denies complaints of CP, dizziness, palpitations, orthopnea, or presycope/syncope. He takes ibuprofen 800 mg nightly for back pain.     Past Medical History:   has a past medical history of Arthritis; Hiatal hernia; Hypertrophy of prostate without urinary obstruction and other lower urinary tract symptoms (LUTS); Kidney stone; Neoplasm of uncertain behavior of skin; Obesity, unspecified; and Organic sleep apnea, unspecified.     Surgical History:   has a past surgical history that includes hernia repair; Tonsillectomy; Colonoscopy (04/01/2010); Lithotripsy (Right, 09/2016); other surgical history (Right, 09/25/2016); and other surgical history (10/12/2016).    Social History:   reports that he quit smoking about 30 years ago. He has a 45.00 pack-year smoking history.  He has never used smokeless tobacco. He reports that he does not drink alcohol or use drugs.      Family History:  family history includes Cancer in his maternal grandmother, maternal uncle, and mother; Emphysema in his father; Heart Failure in his maternal grandfather.      Home Medications:  Encounter Medications          Outpatient Encounter Prescriptions as of 6/27/2018   Medication Sig Dispense Refill    RaNITidine HCl (RANITIDINE ACID REDUCER PO) Take by mouth        ketoconazole (NIZORAL) 2 % cream Apply topically bid intolerance might be attributable to it. It is of interest that his heart rate appears to be fairly well-controlled in the absence of any AV node slowing agents. This infers some element of AV block. After an appropriate period of anticoagulation, restoring sinus rhythm should help us determine the extent to which the atrial fibrillation may contribute to his exercise intolerance.            Plan:  1. Start Xarelto 20 mg for stroke prevention. 2.  Plan for DCCV after 4 weeks of anticoagulation. 3.  Echo at [facility] (closer for pt)  4. Cut back ibuprofen to 1-2 times a week  5. Follow up in 1 month with EP NP ([facility]).         Kirby Rodriguez M.D.                    Office Visit on 6/27/2018            Revision History            Detailed Report

## 2018-08-16 NOTE — PROGRESS NOTES
ETCO2  Monitoring done for conscious sedation. Patient is on 2 liters/min via nasal cannula for procedure. Baseline information:  HR: 90 BP: 137/78  RR: 15 LOC: alert  ETCO2: 31 SpO2: 99    5 minutes after sedation administered:  HR: 82 BP: 137/86  RR: 20 LOC: alert  ETCO2: 32 SpO2: 100    10 min after sedation administered:  HR: 70 BP: 162/95  RR: 24 LOC: comatose  ETCO2: 35 SpO2: 98    15 minutes after sedation administered:  HR: 90 BP: 136/80  RR: 18 LOC:obtunded  ETCO2: 34 SpO2: 98    Advised by Rn that ETCO2 was no longer needed.     well    Patient/caregiver was educated on the proper method of use:  Yes      Level of patient/caregiver understanding able to:   [x] Verbalize understanding   [] Demonstrate understanding       [] Teach back        [] Needs reinforcement       []  No available caregiver               []  Other:     Response to education:  Excellent

## 2018-09-19 ENCOUNTER — OFFICE VISIT (OUTPATIENT)
Dept: CARDIOLOGY CLINIC | Age: 68
End: 2018-09-19

## 2018-09-19 VITALS
HEIGHT: 71 IN | OXYGEN SATURATION: 93 % | HEART RATE: 92 BPM | BODY MASS INDEX: 44.1 KG/M2 | WEIGHT: 315 LBS | SYSTOLIC BLOOD PRESSURE: 116 MMHG | DIASTOLIC BLOOD PRESSURE: 62 MMHG

## 2018-09-19 DIAGNOSIS — I48.0 PAROXYSMAL ATRIAL FIBRILLATION (HCC): Primary | ICD-10-CM

## 2018-09-19 PROCEDURE — 99214 OFFICE O/P EST MOD 30 MIN: CPT | Performed by: INTERNAL MEDICINE

## 2018-09-19 PROCEDURE — 93000 ELECTROCARDIOGRAM COMPLETE: CPT | Performed by: INTERNAL MEDICINE

## 2018-09-19 NOTE — PROGRESS NOTES
[facility]   Cardiac Consultation    Referring Provider:  Calos Raines MD     Chief Complaint   Patient presents with    Follow-Up from [facility] Atrial Fibrillation     HPI:  Naveed Flores is a 77 y.o. male originally seen in 6/2018 for new onset AF, seen at PCP office. Has h/o obesity and untreated SAM, no cardiac history. ECG from 6/25/18 confirmed AF with controlled rate of 88 bpm.  He had been dealing with food poisoning with diarrhea for the past week and complains of ongoing fatigue. He denies any palpitations. Not taking any cardiac meds. Thyroid panel (6/25/18) normal.  He has been diagnosed with SAM, but cannot tolerate the CPAP. He has been sleeping in a recliner. ECG on 6/27/18 and 7/31/18 showed AF, rate controlled. He was not aware that he is in AF. Normal echo (7/17/18) with EF of 50-55%. On Xarelto for stroke prevention. Underwent DCCV on 8/16/18. ECG today shows SR, rate 86. He reports symptomatic improvement while in SR with increase in energy level and exercise tolerance. He is unsure if he would be able to determine a recurrence of AF. Denies complaints of CP, dizziness, palpitations, orthopnea, or presycope/syncope. He takes ibuprofen 800 mg nightly for back pain. With increase in energy level, he is interested in joining [employer]. Past Medical History:   has a past medical history of Arthritis; Atrial fibrillation ([address] Hiatal hernia; Hypertrophy of prostate without urinary obstruction and other lower urinary tract symptoms (LUTS); Kidney stone; Neoplasm of uncertain behavior of skin; Obesity, unspecified; and Organic sleep apnea, unspecified. Surgical History:   has a past surgical history that includes hernia repair; Tonsillectomy; Colonoscopy (04/01/2010); Lithotripsy (Right, 09/2016); other surgical history (Right, 09/25/2016); and other surgical history (10/12/2016).      Social History:   reports that he quit smoking about 30 years ago. He has a 45.00 pack-year smoking history. He has never used smokeless tobacco. He reports that he does not drink alcohol or use drugs. Family History:  family history includes Cancer in his maternal grandmother, maternal uncle, and mother; Emphysema in his father; Heart Failure in his maternal grandfather. Home Medications:  Outpatient Encounter Prescriptions as of 9/19/2018   Medication Sig Dispense Refill    oxyCODONE-acetaminophen (PERCOCET) 7.5-325 MG per tablet Take 1 tablet by mouth every 6 hours as needed for Pain. Matt Zambrano rivaroxaban (XARELTO) 20 MG TABS tablet Take 1 tablet by mouth daily (with breakfast) 90 tablet 3    RaNITidine HCl (RANITIDINE ACID REDUCER PO) Take by mouth      Ascorbic Acid (VITAMIN C PO) Take by mouth daily      Cyanocobalamin (VITAMIN B 12 PO) Take by mouth daily       vitamin D (CHOLECALCIFEROL) 1000 UNIT TABS tablet Take 1,000 Units by mouth daily.  Multiple Vitamins-Minerals (CENTRUM ADULTS PO) Take  by mouth.  [DISCONTINUED] tamsulosin (FLOMAX) 0.4 MG capsule Take 1 capsule by mouth daily for 5 doses 5 capsule 0    [DISCONTINUED] ibuprofen (ADVIL;MOTRIN) 800 MG tablet Take 1 tablet by mouth 3 times daily 270 tablet 1     No facility-administered encounter medications on file as of 9/19/2018. Allergies:  Bee venom and Adhesive tape     Review of Systems   Constitutional: Negative. HENT: Negative. Eyes: Negative. Respiratory: Negative. Cardiovascular: Negative. Gastrointestinal: Negative. Genitourinary: Negative. Musculoskeletal: Negative. Skin: Negative. Neurological: Negative. Hematological: Negative. Psychiatric/Behavioral: Negative. /62   Pulse 92   Ht 5' 11\" (1.803 m)   Wt (!) 318 lb (144.2 kg)   SpO2 93%   BMI 44.35 kg/m²     EKG today, personally reviewed. SR 86    Objective:  Physical Exam   Constitutional: He is oriented to person, place, and time. He appears well-developed and well-nourished.

## 2018-09-20 DIAGNOSIS — G89.29 CHRONIC MIDLINE LOW BACK PAIN WITHOUT SCIATICA: ICD-10-CM

## 2018-09-20 DIAGNOSIS — M54.50 CHRONIC MIDLINE LOW BACK PAIN WITHOUT SCIATICA: ICD-10-CM

## 2018-09-20 RX ORDER — HYDROCODONE BITARTRATE AND ACETAMINOPHEN 7.5; 325 MG/1; MG/1
1 TABLET ORAL EVERY 6 HOURS PRN
Qty: 30 TABLET | Refills: 0 | Status: SHIPPED | OUTPATIENT
Start: 2018-09-20 | End: 2020-03-17 | Stop reason: SDUPTHER

## 2018-09-20 NOTE — TELEPHONE ENCOUNTER
Date of last refill of this med was 7/30/18, # of pills given 10 and # of refills given 0. Their next appointment is none, the last date patient was seen was 6/25/18. Does patient have medication agreement on file? No  Has drug screen been done in last 12 months if needed? no  Has [place] report been ran in last 90 days?  Yes, 9/20/18

## 2018-11-14 ENCOUNTER — NURSE ONLY (OUTPATIENT)
Dept: FAMILY MEDICINE CLINIC | Age: 68
End: 2018-11-14
Payer: COMMERCIAL

## 2018-11-14 DIAGNOSIS — I48.19 PERSISTENT ATRIAL FIBRILLATION (HCC): Primary | ICD-10-CM

## 2018-11-14 PROCEDURE — 93000 ELECTROCARDIOGRAM COMPLETE: CPT | Performed by: FAMILY MEDICINE

## 2019-02-05 ENCOUNTER — OFFICE VISIT (OUTPATIENT)
Dept: FAMILY MEDICINE CLINIC | Age: 69
End: 2019-02-05
Payer: COMMERCIAL

## 2019-02-05 VITALS
SYSTOLIC BLOOD PRESSURE: 122 MMHG | OXYGEN SATURATION: 97 % | HEIGHT: 72 IN | HEART RATE: 115 BPM | WEIGHT: 315 LBS | DIASTOLIC BLOOD PRESSURE: 76 MMHG | BODY MASS INDEX: 42.66 KG/M2

## 2019-02-05 DIAGNOSIS — I48.19 PERSISTENT ATRIAL FIBRILLATION (HCC): ICD-10-CM

## 2019-02-05 DIAGNOSIS — L03.317 CELLULITIS AND ABSCESS OF BUTTOCK: Primary | ICD-10-CM

## 2019-02-05 DIAGNOSIS — L02.31 CELLULITIS AND ABSCESS OF BUTTOCK: Primary | ICD-10-CM

## 2019-02-05 PROCEDURE — 99213 OFFICE O/P EST LOW 20 MIN: CPT | Performed by: FAMILY MEDICINE

## 2019-02-05 RX ORDER — SULFAMETHOXAZOLE AND TRIMETHOPRIM 400; 80 MG/1; MG/1
1 TABLET ORAL 2 TIMES DAILY
Qty: 20 TABLET | Refills: 0 | Status: SHIPPED | OUTPATIENT
Start: 2019-02-05 | End: 2020-01-09 | Stop reason: SDUPTHER

## 2019-02-05 ASSESSMENT — PATIENT HEALTH QUESTIONNAIRE - PHQ9
1. LITTLE INTEREST OR PLEASURE IN DOING THINGS: 0
SUM OF ALL RESPONSES TO PHQ9 QUESTIONS 1 & 2: 0
SUM OF ALL RESPONSES TO PHQ QUESTIONS 1-9: 0
2. FEELING DOWN, DEPRESSED OR HOPELESS: 0
SUM OF ALL RESPONSES TO PHQ QUESTIONS 1-9: 0

## 2019-02-05 ASSESSMENT — ENCOUNTER SYMPTOMS
SHORTNESS OF BREATH: 0
COLOR CHANGE: 1

## 2019-02-06 ENCOUNTER — TELEPHONE (OUTPATIENT)
Dept: CARDIOLOGY CLINIC | Age: 69
End: 2019-02-06

## 2019-03-05 ENCOUNTER — OFFICE VISIT (OUTPATIENT)
Dept: FAMILY MEDICINE CLINIC | Age: 69
End: 2019-03-05
Payer: MEDICARE

## 2019-03-05 VITALS
BODY MASS INDEX: 42.66 KG/M2 | OXYGEN SATURATION: 98 % | SYSTOLIC BLOOD PRESSURE: 130 MMHG | HEIGHT: 72 IN | TEMPERATURE: 97.6 F | WEIGHT: 315 LBS | DIASTOLIC BLOOD PRESSURE: 80 MMHG | HEART RATE: 96 BPM

## 2019-03-05 DIAGNOSIS — Z00.00 ROUTINE GENERAL MEDICAL EXAMINATION AT A HEALTH CARE FACILITY: Primary | ICD-10-CM

## 2019-03-05 PROCEDURE — G0439 PPPS, SUBSEQ VISIT: HCPCS | Performed by: FAMILY MEDICINE

## 2019-03-05 ASSESSMENT — ANXIETY QUESTIONNAIRES: GAD7 TOTAL SCORE: 0

## 2019-03-05 ASSESSMENT — LIFESTYLE VARIABLES: HOW OFTEN DO YOU HAVE A DRINK CONTAINING ALCOHOL: 0

## 2019-03-05 ASSESSMENT — PATIENT HEALTH QUESTIONNAIRE - PHQ9
SUM OF ALL RESPONSES TO PHQ QUESTIONS 1-9: 1
SUM OF ALL RESPONSES TO PHQ QUESTIONS 1-9: 1

## 2019-03-28 NOTE — PROGRESS NOTES
[facility]   Cardiac Consultation    Referring Provider:  Annette Gaitan MD     Chief Complaint   Patient presents with    Atrial Fibrillation     HPI:  Maine Machuca is a 77 y.o. male originally seen in 6/2018 for new onset AF, seen at PCP office. Has h/o obesity and untreated SAM, no cardiac history. ECG from 6/25/18 confirmed AF with controlled rate of 88 bpm.  He has been diagnosed with SAM, but cannot tolerate the CPAP. He has been sleeping in a recliner. Normal echo (7/17/18) with EF of 50-55%. On Xarelto for stroke prevention. Underwent DCCV on 8/16/18. He reports symptomatic improvement while in SR with increase in energy level and exercise tolerance. ECG from 9/18 showed sinus rhythm. ECG from 11/14/18 showed recurrence of AF. He does not have palpitations, but describes weakness and exercise intolerance with AF. Right now his energy level is decreased, and while preaching or standing still for extended periods of time he gets SOB, lightheaded, weakness fatigue, dizzy, diaphoretic, and feels faint. Past Medical History:   has a past medical history of Arthritis, Atrial fibrillation ([address] Hiatal hernia, Hypertrophy of prostate without urinary obstruction and other lower urinary tract symptoms (LUTS), Kidney stone, Neoplasm of uncertain behavior of skin, Obesity, unspecified, and Organic sleep apnea, unspecified. Surgical History:   has a past surgical history that includes hernia repair; Tonsillectomy; Colonoscopy (04/01/2010); Lithotripsy (Right, 09/2016); other surgical history (Right, 09/25/2016); and other surgical history (10/12/2016). Social History:   reports that he quit smoking about 31 years ago. He has a 45.00 pack-year smoking history. He has never used smokeless tobacco. He reports that he does not drink alcohol or use drugs.      Family History:  family history includes Cancer in his maternal grandmother, maternal uncle, and mother; Emphysema in his father; Heart Failure in his maternal grandfather. Home Medications:  Outpatient Encounter Medications as of 4/2/2019   Medication Sig Dispense Refill    rivaroxaban (XARELTO) 20 MG TABS tablet Take 1 tablet by mouth daily (with breakfast) 90 tablet 3     No facility-administered encounter medications on file as of 4/2/2019. Allergies:  Bee venom and Adhesive tape     Review of Systems   Constitutional: Negative. HENT: Negative. Eyes: Negative. Respiratory: Negative. Cardiovascular: Negative. Gastrointestinal: Negative. Genitourinary: Negative. Musculoskeletal: Negative. Skin: Negative. Neurological: Negative. Hematological: Negative. Psychiatric/Behavioral: Negative. /78   Pulse 88   Ht 6' (1.829 m)   Wt (!) 319 lb (144.7 kg)   BMI 43.26 kg/m²     EKG today, personally reviewed. SR 86    7/17/18 Echo  Summary   Low normal left ventricular systolic function with an estimated ejection   fraction of 50-55%.   Normal left ventricular diastolic filling pressure.   Right ventricular systolic function is mildly reduced to low normal.   The right ventricle is mildly enlarged.   The right atrium is mildly dilated.   Mild mitral regurgitation.   Mild aortic regurgitation.   Mild tricuspid regurgitation.   Normal systolic pulmonary artery pressure (SPAP) estimated at 36 mmHg (RA   pressure 8 mmHg).  Mild pulmonic regurgitation. Objective:  Physical Exam   Constitutional: He is oriented to person, place, and time. He appears well-developed and well-nourished. HENT:   Head: Normocephalic and atraumatic. Eyes: Pupils are equal, round, and reactive to light. Neck: Normal range of motion. Cardiovascular: Normal rate, irregular rhythm and normal heart sounds. Pulmonary/Chest: Effort normal and breath sounds normal.   Abdominal: Soft. No tenderness. Musculoskeletal: Normal range of motion. He exhibits no edema.    Neurological: He is alert and oriented to person, place, and time. Skin: Skin is warm and dry. Psychiatric: He has a normal mood and affect. Assessment:  1. atrial fibrillation-  He did appear to have had an improvement in his functional status with restoration of sinus rhythm. If he has no potential for ischemia, a type Ic antiarrhythmic agent may be beneficial to maintain sinus rhythm. Plan:  1. Continue with Xarelto for stroke prevention. 2.  Stress test prior to deciding on antiarrythmic. If Stress test normal will proceed with propafenone 225 mg po q 8 h.   3.  Follow up with Dr. Salome Drew for ECGs every 3 months. 4.  Follow up in 6 months. Gavin Poole RN, am scribing for and in the presence of Dr. Carmel Patel. 04/02/19   4:40 PM      I, Dr. Carmel Patel, personally performed the services described in this documentation as scribed by Elizabeth Rodríguez RN  in my presence, and it is both accurate and complete.       Carmel Patel MD

## 2019-04-02 ENCOUNTER — OFFICE VISIT (OUTPATIENT)
Dept: CARDIOLOGY CLINIC | Age: 69
End: 2019-04-02
Payer: MEDICARE

## 2019-04-02 VITALS
SYSTOLIC BLOOD PRESSURE: 122 MMHG | DIASTOLIC BLOOD PRESSURE: 78 MMHG | HEART RATE: 88 BPM | HEIGHT: 72 IN | WEIGHT: 315 LBS | BODY MASS INDEX: 42.66 KG/M2

## 2019-04-02 DIAGNOSIS — I48.0 PAROXYSMAL ATRIAL FIBRILLATION (HCC): Primary | ICD-10-CM

## 2019-04-02 DIAGNOSIS — R07.9 CHEST PAIN, UNSPECIFIED TYPE: ICD-10-CM

## 2019-04-02 PROCEDURE — 93000 ELECTROCARDIOGRAM COMPLETE: CPT | Performed by: INTERNAL MEDICINE

## 2019-04-02 PROCEDURE — 99214 OFFICE O/P EST MOD 30 MIN: CPT | Performed by: INTERNAL MEDICINE

## 2019-04-11 ENCOUNTER — HOSPITAL ENCOUNTER (OUTPATIENT)
Dept: NUCLEAR MEDICINE | Age: 69
Discharge: HOME | End: 2019-04-11
Payer: MEDICARE

## 2019-04-11 DIAGNOSIS — R07.9 CHEST PAIN, UNSPECIFIED TYPE: ICD-10-CM

## 2019-04-11 PROCEDURE — 78452 HT MUSCLE IMAGE SPECT MULT: CPT

## 2019-04-11 PROCEDURE — 3430000000 HC RX DIAGNOSTIC RADIOPHARMACEUTICAL: Performed by: INTERNAL MEDICINE

## 2019-04-11 PROCEDURE — A9502 TC99M TETROFOSMIN: HCPCS | Performed by: INTERNAL MEDICINE

## 2019-04-11 RX ADMIN — TETROFOSMIN 33.4 MILLICURIE: 0.23 INJECTION, POWDER, LYOPHILIZED, FOR SOLUTION INTRAVENOUS at 09:07

## 2019-04-11 RX ADMIN — TETROFOSMIN 11.8 MILLICURIE: 0.23 INJECTION, POWDER, LYOPHILIZED, FOR SOLUTION INTRAVENOUS at 07:45

## 2019-05-08 ENCOUNTER — TELEPHONE (OUTPATIENT)
Dept: CARDIOLOGY CLINIC | Age: 69
End: 2019-05-08

## 2019-05-21 NOTE — TELEPHONE ENCOUNTER
He needs to start propafenone 225 mg po q 8 h. He can see me or an EPNP prior to starting if he wishes to.

## 2019-05-21 NOTE — TELEPHONE ENCOUNTER
Pt called stating he received his stress test results on 5-13-19. Pt is asking what the plan is next as he is still having afib, no energy, feeling weak. He is requesting a little more info regarding his stress test as well. Please advise and contact pt at [phone].

## 2019-05-23 RX ORDER — PROPAFENONE HYDROCHLORIDE 225 MG/1
225 TABLET, FILM COATED ORAL EVERY 8 HOURS
Qty: 90 TABLET | Refills: 3 | Status: SHIPPED | OUTPATIENT
Start: 2019-05-23 | End: 2019-07-25 | Stop reason: SDUPTHER

## 2019-06-11 ENCOUNTER — OFFICE VISIT (OUTPATIENT)
Dept: CARDIOLOGY CLINIC | Age: 69
End: 2019-06-11
Payer: MEDICARE

## 2019-06-11 ENCOUNTER — TELEPHONE (OUTPATIENT)
Dept: CARDIOLOGY CLINIC | Age: 69
End: 2019-06-11

## 2019-06-11 VITALS
HEIGHT: 72 IN | WEIGHT: 313.1 LBS | DIASTOLIC BLOOD PRESSURE: 74 MMHG | HEART RATE: 119 BPM | BODY MASS INDEX: 42.41 KG/M2 | SYSTOLIC BLOOD PRESSURE: 102 MMHG

## 2019-06-11 DIAGNOSIS — I48.19 PERSISTENT ATRIAL FIBRILLATION (HCC): Primary | ICD-10-CM

## 2019-06-11 PROCEDURE — 99214 OFFICE O/P EST MOD 30 MIN: CPT | Performed by: NURSE PRACTITIONER

## 2019-06-11 PROCEDURE — 93000 ELECTROCARDIOGRAM COMPLETE: CPT | Performed by: NURSE PRACTITIONER

## 2019-06-11 RX ORDER — METOPROLOL SUCCINATE 25 MG/1
25 TABLET, EXTENDED RELEASE ORAL DAILY
Qty: 30 TABLET | Refills: 5 | Status: SHIPPED | OUTPATIENT
Start: 2019-06-11 | End: 2019-07-25 | Stop reason: SDUPTHER

## 2019-06-11 NOTE — TELEPHONE ENCOUNTER
Called and scheduled cardioversion. Reviewed instructions and will mail as well. Patient verbalized understanding.

## 2019-06-11 NOTE — TELEPHONE ENCOUNTER
Patient was seen today by NP [facility]. Please schedule for a Cardioversion with [facility], no anesthesia needed & no medications to hold. Patient is asking for a Tues or Thurs please. Thank you!

## 2019-06-11 NOTE — PROGRESS NOTES
[address]   Electrophysiology Note              Date:  June 11, 2019  Patient name: Sophia Baker  YOB: 1950    Primary Care physician: Mamta Yan MD    HISTORY OF PRESENT ILLNESS: The patient is a 77 y.o.  male with a history of AF, obesity and sleep apnea (cannot tolerate CPAP). In June of 2018, he was dealing with an episode of food poisoning and diarrhea x 1 week and was incidentally found to be in afib by his PCP. Echo in 7/2018 showed an EF of 50-55%. Underwent successful DCCV in August 2018. Had recurrence of AF in 11/2018. He remained in AF in 4/2019, he complained of fatigue, SOB, and dizziness. Stress test 4/2019 showed low risk and was started on Rythmol 5/2019. Today he is being seen for atrial fibrillation. EKG shows AF with a HR of 119. He reports more stamina since starting Rythmol. He complains of fatigue and shortness of breath and palpitations with exertion. Has some lower extremity swelling at night. Denies chest pain or dizziness. Past Medical History:   has a past medical history of Arthritis, Atrial fibrillation ([address] Hiatal hernia, Hypertrophy of prostate without urinary obstruction and other lower urinary tract symptoms (LUTS), Kidney stone, Neoplasm of uncertain behavior of skin, Obesity, unspecified, and Organic sleep apnea, unspecified. Past Surgical History:   has a past surgical history that includes hernia repair; Tonsillectomy; Colonoscopy (04/01/2010); Lithotripsy (Right, 09/2016); other surgical history (Right, 09/25/2016); and other surgical history (10/12/2016). Home Medications:    Prior to Admission medications    Medication Sig Start Date End Date Taking?  Authorizing Provider   metoprolol succinate (TOPROL XL) 25 MG extended release tablet Take 1 tablet by mouth daily 6/11/19  Yes CHARISSE Hackett CNP   propafenone (RYTHMOL) 225 MG tablet Take 1 tablet by mouth every 8 hours 5/23/19  Yes Juma Cai MD   rivaroxaban (XARELTO) 20 MG TABS tablet Take 1 tablet by mouth daily (with breakfast) 7/23/18  Yes Sergei Rosario MD       Allergies:  Bee venom and Adhesive tape    Social History:   reports that he quit smoking about 31 years ago. He has a 45.00 pack-year smoking history. He has never used smokeless tobacco. He reports that he does not drink alcohol or use drugs. Family History: family history includes Cancer in his maternal grandmother, maternal uncle, and mother; Emphysema in his father; Heart Failure in his maternal grandfather. Review of Systems   Constitutional: + fatigue    HENT: Negative. Eyes: Negative. Respiratory: Negative. Cardiovascular: see HPI  Gastrointestinal: Negative. Genitourinary: Negative. Musculoskeletal: Negative. Skin: Negative. Neurological: Negative. Hematological: Negative. Psychiatric/Behavioral: Negative. PHYSICAL EXAM:    Vital signs:    /74   Pulse 119   Ht 6' (1.829 m)   Wt (!) 313 lb 1.6 oz (142 kg)   BMI 42.46 kg/m²      Constitutional and general appearance: alert, cooperative, no distress and appears stated age  HEENT: PERRL, no cervical lymphadenopathy. No masses palpable.  Normal oral mucosa  Respiratory:  · Normal excursion and expansion without use of accessory muscles  · Resp auscultation: Normal breath sounds without wheezing, rhonchi, and rales  Cardiovascular:     · The apical impulse is not displaced  · Heart tones are crisp and normal. Irregular S1 and S2.  · Jugular venous pulsation Normal  · The carotid upstroke is normal in amplitude and contour without delay or bruit  · Peripheral pulses are symmetrical and full   Abdomen:  · No masses or tenderness  · Bowel sounds present  Extremities:  ·  No cyanosis or clubbing  ·  2+ pitting lower extremity edema  ·  Skin: warm and dry  Neurological:  · Alert and oriented  · Moves all extremities well  · No abnormalities of mood, affect, memory, mentation, or behavior

## 2019-06-11 NOTE — LETTER
[address]  EP Procedure Sheet  6/11/19    Bonniee Shorten  1950    Physician: Dr. Danielle Gay     EP Procedures   Pacemaker implant  EP Study    ICD implant  Atrial flutter ablation     Biv implant  Atrial fibrillation ablation    Generator Change  SVT ablation    Lead revision  VT ablation    Lead extraction +/- upgrade  AVN ablation    Loop implant X Cardioversion     Other:   ROLY     Equipment   [employer]   GRACIA Mapping System    [place]  [address] [address]  Cryo[employer]blation    [employer]  Laser Lead Extraction    Special Equipment       EP Procedures Scheduling Request  Time Requested  10:00   Specific Day 6/25/19   Anesthesia    CT surgery backup    Location [facility]     Pre-Procedure Labs / Imaging  X PT/INR  Type & cross    CBC  Units PRBC   X BMP/Mg  Units FFP    Venogram  CXR    Echo  Pulmonary CTA for Pulmonary vein mapping       Date of last admission: greater than 30 days

## 2019-06-11 NOTE — TELEPHONE ENCOUNTER
External Cardioversion    Date of Procedure: 6/25/19    Time of Arrival: 8:30    Cardiologist performing procedure: Dr. Aquino Patient     · Arrive at [facility] through the main entrance. Check in at the Outpatient Diagnostic desk on the 1st floor. · Do not eat or drink anything after midnight the night before the procedure. · You may brush your teeth and rinse the morning of the procedure. · Take ALL of your routine medications the morning of the procedure. However, if you are taking diabetic medications, please HOLD on the day of the procedure (including insulin). If you take Lantus insulin, take HALF of your usual dose the night before. · Do NOT stop taking aspirin, Plavix, coumadin, Eliquis, Xarelto, or Pradaxa (any blood thinners)    · Do not put on any lotion, powder, or deodorant the morning of the procedure. · Please bring a list of your medications to the hospital with you. · You must have someone available to drive you home. It is recommended that you do not drive for 24 hours after the procedure. · If you are unable to make this appointment, please call [facility] at [phone].

## 2019-06-11 NOTE — PATIENT INSTRUCTIONS
Start checking resting heart rate and BP at home. Resting heart rate goal is . Start metoprolol 25 mg once a day.    Cardioversion at [facility] with Dr. Baptiste Mediate not miss doses of Xarelto before cardioversion   Call me in 2 weeks with an update

## 2019-06-11 NOTE — LETTER
5/23/19  Yes Zackary Heard MD   rivaroxaban (XARELTO) 20 MG TABS tablet Take 1 tablet by mouth daily (with breakfast) 7/23/18  Yes Zackary Heard MD       Allergies:  Bee venom and Adhesive tape    Social History:   reports that he quit smoking about 31 years ago. He has a 45.00 pack-year smoking history. He has never used smokeless tobacco. He reports that he does not drink alcohol or use drugs. Family History: family history includes Cancer in his maternal grandmother, maternal uncle, and mother; Emphysema in his father; Heart Failure in his maternal grandfather. Review of Systems   Constitutional: + fatigue    HENT: Negative. Eyes: Negative. Respiratory: Negative. Cardiovascular: see HPI  Gastrointestinal: Negative. Genitourinary: Negative. Musculoskeletal: Negative. Skin: Negative. Neurological: Negative. Hematological: Negative. Psychiatric/Behavioral: Negative. PHYSICAL EXAM:    Vital signs:    /74   Pulse 119   Ht 6' (1.829 m)   Wt (!) 313 lb 1.6 oz (142 kg)   BMI 42.46 kg/m²       Constitutional and general appearance: alert, cooperative, no distress and appears stated age  HEENT: PERRL, no cervical lymphadenopathy. No masses palpable.  Normal oral mucosa  Respiratory:  · Normal excursion and expansion without use of accessory muscles  · Resp auscultation: Normal breath sounds without wheezing, rhonchi, and rales  Cardiovascular:     · The apical impulse is not displaced  · Heart tones are crisp and normal. Irregular S1 and S2.  · Jugular venous pulsation Normal  · The carotid upstroke is normal in amplitude and contour without delay or bruit  · Peripheral pulses are symmetrical and full   Abdomen:  · No masses or tenderness  · Bowel sounds present  Extremities:  ·  No cyanosis or clubbing  ·  2+ pitting lower extremity edema  ·  Skin: warm and dry  Neurological:  · Alert and oriented  · Moves all extremities well Roxanna Oliveira, APRN-GENTRY  [address]  [phone]

## 2019-06-12 ENCOUNTER — SURG/PROC ORDERS (OUTPATIENT)
Dept: CARDIOLOGY CLINIC | Age: 69
End: 2019-06-12

## 2019-06-12 RX ORDER — SODIUM CHLORIDE 0.9 % (FLUSH) 0.9 %
10 SYRINGE (ML) INJECTION EVERY 12 HOURS SCHEDULED
Status: CANCELLED | OUTPATIENT
Start: 2019-06-12

## 2019-06-12 RX ORDER — MIDAZOLAM HYDROCHLORIDE 1 MG/ML
1 INJECTION INTRAMUSCULAR; INTRAVENOUS ONCE
Status: CANCELLED | OUTPATIENT
Start: 2019-06-25

## 2019-06-12 RX ORDER — SODIUM CHLORIDE 0.9 % (FLUSH) 0.9 %
10 SYRINGE (ML) INJECTION PRN
Status: CANCELLED | OUTPATIENT
Start: 2019-06-12

## 2019-06-12 RX ORDER — SODIUM CHLORIDE 9 MG/ML
INJECTION, SOLUTION INTRAVENOUS CONTINUOUS
Status: CANCELLED | OUTPATIENT
Start: 2019-06-12

## 2019-06-25 ENCOUNTER — HOSPITAL ENCOUNTER (OUTPATIENT)
Dept: CARDIAC CATH/INVASIVE PROCEDURES | Age: 69
Discharge: HOME | End: 2019-06-25
Attending: INTERNAL MEDICINE | Admitting: INTERNAL MEDICINE
Payer: MEDICARE

## 2019-06-25 VITALS
OXYGEN SATURATION: 98 % | TEMPERATURE: 97.7 F | BODY MASS INDEX: 42.45 KG/M2 | SYSTOLIC BLOOD PRESSURE: 128 MMHG | WEIGHT: 313 LBS | RESPIRATION RATE: 12 BRPM | DIASTOLIC BLOOD PRESSURE: 78 MMHG | HEART RATE: 88 BPM

## 2019-06-25 LAB
ANION GAP SERPL CALCULATED.3IONS-SCNC: 9 MMOL/L (ref 3–16)
BUN BLDV-MCNC: 17 MG/DL (ref 7–20)
CALCIUM SERPL-MCNC: 8.3 MG/DL (ref 8.3–10.6)
CHLORIDE BLD-SCNC: 107 MMOL/L (ref 99–110)
CO2: 25 MMOL/L (ref 21–32)
CREAT SERPL-MCNC: 0.9 MG/DL (ref 0.8–1.3)
EKG ATRIAL RATE: 73 BPM
EKG ATRIAL RATE: 97 BPM
EKG DIAGNOSIS: NORMAL
EKG DIAGNOSIS: NORMAL
EKG P AXIS: 100 DEGREES
EKG P-R INTERVAL: 228 MS
EKG Q-T INTERVAL: 378 MS
EKG Q-T INTERVAL: 426 MS
EKG QRS DURATION: 112 MS
EKG QRS DURATION: 88 MS
EKG QTC CALCULATION (BAZETT): 457 MS
EKG QTC CALCULATION (BAZETT): 469 MS
EKG R AXIS: 29 DEGREES
EKG R AXIS: 37 DEGREES
EKG T AXIS: 45 DEGREES
EKG T AXIS: 7 DEGREES
EKG VENTRICULAR RATE: 73 BPM
EKG VENTRICULAR RATE: 88 BPM
GFR AFRICAN AMERICAN: >60
GFR NON-AFRICAN AMERICAN: >60
GLUCOSE BLD-MCNC: 108 MG/DL (ref 70–99)
INR BLD: 1.45 (ref 0.86–1.14)
POTASSIUM SERPL-SCNC: 4.6 MMOL/L (ref 3.5–5.1)
PROTHROMBIN TIME: 16.5 SEC (ref 9.8–13)
SODIUM BLD-SCNC: 141 MMOL/L (ref 136–145)

## 2019-06-25 PROCEDURE — 92960 CARDIOVERSION ELECTRIC EXT: CPT

## 2019-06-25 PROCEDURE — 85610 PROTHROMBIN TIME: CPT

## 2019-06-25 PROCEDURE — 80048 BASIC METABOLIC PNL TOTAL CA: CPT

## 2019-06-25 PROCEDURE — 93005 ELECTROCARDIOGRAM TRACING: CPT | Performed by: INTERNAL MEDICINE

## 2019-06-25 PROCEDURE — 93010 ELECTROCARDIOGRAM REPORT: CPT | Performed by: INTERNAL MEDICINE

## 2019-06-25 PROCEDURE — 92960 CARDIOVERSION ELECTRIC EXT: CPT | Performed by: INTERNAL MEDICINE

## 2019-06-25 RX ORDER — SODIUM CHLORIDE 0.9 % (FLUSH) 0.9 %
10 SYRINGE (ML) INJECTION EVERY 12 HOURS SCHEDULED
Status: DISCONTINUED | OUTPATIENT
Start: 2019-06-25 | End: 2019-06-27 | Stop reason: HOSPADM

## 2019-06-25 RX ORDER — SODIUM CHLORIDE 9 MG/ML
INJECTION, SOLUTION INTRAVENOUS CONTINUOUS
Status: DISCONTINUED | OUTPATIENT
Start: 2019-06-25 | End: 2019-06-27 | Stop reason: HOSPADM

## 2019-06-25 RX ORDER — MIDAZOLAM HYDROCHLORIDE 1 MG/ML
1 INJECTION INTRAMUSCULAR; INTRAVENOUS ONCE
Status: DISCONTINUED | OUTPATIENT
Start: 2019-06-25 | End: 2019-06-27 | Stop reason: HOSPADM

## 2019-06-25 RX ORDER — SODIUM CHLORIDE 0.9 % (FLUSH) 0.9 %
10 SYRINGE (ML) INJECTION PRN
Status: DISCONTINUED | OUTPATIENT
Start: 2019-06-25 | End: 2019-06-27 | Stop reason: HOSPADM

## 2019-06-25 NOTE — PROCEDURES
Anesthesia: versed 1 mg, brevital 55 mg  Complications: none apparent  Findings: unsuccessful conversion to sinus rhythm with 150 J synchronized CV shock. Successful CV to sinus rhythm with 200 J synchronized shock. Uneventful recovery.

## 2019-06-25 NOTE — H&P
Talat Coulter MD            Allergies:  Bee venom and Adhesive tape     Social History:   reports that he quit smoking about 31 years ago. He has a 45.00 pack-year smoking history. He has never used smokeless tobacco. He reports that he does not drink alcohol or use drugs.      Family History: family history includes Cancer in his maternal grandmother, maternal uncle, and mother; Emphysema in his father; Heart Failure in his maternal grandfather.     Review of Systems   Constitutional: + fatigue    HENT: Negative. Eyes: Negative. Respiratory: Negative. Cardiovascular: see HPI  Gastrointestinal: Negative. Genitourinary: Negative. Musculoskeletal: Negative. Skin: Negative. Neurological: Negative. Hematological: Negative. Psychiatric/Behavioral: Negative.     PHYSICAL EXAM:    Vital signs:    /74   Pulse 119   Ht 6' (1.829 m)   Wt (!) 313 lb 1.6 oz (142 kg)   BMI 42.46 kg/m²       Constitutional and general appearance: alert, cooperative, no distress and appears stated age  HEENT: PERRL, no cervical lymphadenopathy. No masses palpable.  Normal oral mucosa  Respiratory:  · Normal excursion and expansion without use of accessory muscles  · Resp auscultation: Normal breath sounds without wheezing, rhonchi, and rales  Cardiovascular:                                     · The apical impulse is not displaced  · Heart tones are crisp and normal. Irregular S1 and S2.  · Jugular venous pulsation Normal  · The carotid upstroke is normal in amplitude and contour without delay or bruit  · Peripheral pulses are symmetrical and full   Abdomen:  · No masses or tenderness  · Bowel sounds present  Extremities:  ·  No cyanosis or clubbing  ·  2+ pitting lower extremity edema  ·  Skin: warm and dry  Neurological:  · Alert and oriented  · Moves all extremities well  · No abnormalities of mood, affect, memory, mentation, or behavior are noted     DATA:    ECG 6/11/2019:       Echo 7/17/2018:  Low normal left ventricular systolic function with an estimated ejection  fraction of 50-55%. Normal left ventricular diastolic filling pressure. Right ventricular systolic function is mildly reduced to low normal.  The right ventricle is mildly enlarged. The right atrium is mildly dilated. Mild mitral regurgitation. Mild aortic regurgitation. Mild tricuspid regurgitation. Normal systolic pulmonary artery pressure (SPAP) estimated at 36 mmHg (RA pressure 8 mmHg). Mild pulmonic regurgitation.     Stress 4/11/2019: Abnormal low risk myocardial perfusion study.    Cannot exclude small area of ischemia within the apical segment.    Normal left ventricular size, wall motion, and function.    The estimated left ventricular function is 64%.      All labs and testing reviewed. CARDIOLOGY LABS:   CBC: No results for input(s): WBC, HGB, HCT, PLT in the last 72 hours. BMP: No results for input(s): NA, K, CO2, BUN, CREATININE, LABGLOM, GLUCOSE in the last 72 hours. PT/INR: No results for input(s): PROTIME, INR in the last 72 hours. APTT:No results for input(s): APTT in the last 72 hours. FASTING LIPID PANEL:        Lab Results   Component Value Date     HDL 39 05/17/2016     LDLCALC 90 05/17/2016     TRIG 94 05/17/2016      LIVER PROFILE:No results for input(s): AST, ALT, ALB in the last 72 hours.        Assessment:   1. Symptomatic persistent atrial fibrillation: ongoing               -[id number] score 1 (age)               -Rythmol started 5/2019  2. Morbid obesity              -BMI 43.26  3. Sleep apnea: cannot tolerate CPAP     Plan:   1. Continue Xarelto and Rythmol  2. Start Toprol 25mg po QD for elevated heart rate  3. Monitor HR and BP at home and call if consistently out of goal ranges   4. Cardioversion with Dr. Jose Warren (risks, benefits and alternative therapies discussed)  5.  Follow up after procedure     Nicole Kahn MD  [address]

## 2019-06-25 NOTE — SEDATION DOCUMENTATION
Sedation start time: 1007  Sedation stop time: 1016  Mallampati: 3  Pre and post Samreen score: 10/10  Medication given: IV Versed, IV Brevital  Pre-Procedure Assessment / Plan:  ASA: Class 2 - A normal healthy patient with mild systemic disease  Level of Sedation Plan:Deep sedation  Post Procedure plan: Return to same level of care

## 2019-07-25 ENCOUNTER — OFFICE VISIT (OUTPATIENT)
Dept: CARDIOLOGY CLINIC | Age: 69
End: 2019-07-25
Payer: MEDICARE

## 2019-07-25 VITALS
BODY MASS INDEX: 44.1 KG/M2 | HEART RATE: 64 BPM | HEIGHT: 71 IN | DIASTOLIC BLOOD PRESSURE: 82 MMHG | SYSTOLIC BLOOD PRESSURE: 124 MMHG | WEIGHT: 315 LBS

## 2019-07-25 DIAGNOSIS — I48.19 PERSISTENT ATRIAL FIBRILLATION (HCC): ICD-10-CM

## 2019-07-25 PROCEDURE — 93000 ELECTROCARDIOGRAM COMPLETE: CPT | Performed by: NURSE PRACTITIONER

## 2019-07-25 PROCEDURE — 99213 OFFICE O/P EST LOW 20 MIN: CPT | Performed by: NURSE PRACTITIONER

## 2019-07-25 RX ORDER — PROPAFENONE HYDROCHLORIDE 225 MG/1
225 TABLET, FILM COATED ORAL EVERY 8 HOURS
Qty: 270 TABLET | Refills: 3 | Status: SHIPPED | OUTPATIENT
Start: 2019-07-25 | End: 2019-10-31

## 2019-07-25 RX ORDER — METOPROLOL SUCCINATE 25 MG/1
25 TABLET, EXTENDED RELEASE ORAL DAILY
Qty: 90 TABLET | Refills: 3 | Status: SHIPPED | OUTPATIENT
Start: 2019-07-25 | End: 2019-11-07 | Stop reason: SDUPTHER

## 2019-07-25 NOTE — PROGRESS NOTES
[address]   Electrophysiology Note              Date:  July 25, 2019  Patient name: Agueda Sargent  YOB: 1950    Primary Care physician: Karrie William MD    HISTORY OF PRESENT ILLNESS: The patient is a 68 y.o.  male with a history of AF, obesity and sleep apnea (cannot tolerate CPAP). In June of 2018, he was dealing with an episode of food poisoning and diarrhea x 1 week and was incidentally found to be in afib by his PCP. Echo in 7/2018 showed an EF of 50-55%. Underwent successful DCCV in August 2018. Had recurrence of AF in 11/2018. He remained in AF in 4/2019, he complained of fatigue, SOB, and dizziness. Stress test 4/2019 showed low risk and was started on Rythmol 5/2019. On 6/25/2019 he had a successful cardioversion. Today he is being seen for atrial fibrillation. EKG shows sinus rhythm with a HR of 65. He states he has had marked improvement in fatigue since cardioversion. Has chronic exertional shortness of breath. Denies palpitations or chest pain. Past Medical History:   has a past medical history of Arthritis, Atrial fibrillation ([address] Hiatal hernia, Hypertrophy of prostate without urinary obstruction and other lower urinary tract symptoms (LUTS), Kidney stone, Neoplasm of uncertain behavior of skin, Obesity, unspecified, and Organic sleep apnea, unspecified. Past Surgical History:   has a past surgical history that includes hernia repair; Tonsillectomy; Colonoscopy (04/01/2010); Lithotripsy (Right, 09/2016); other surgical history (Right, 09/25/2016); and other surgical history (10/12/2016). Home Medications:    Prior to Admission medications    Medication Sig Start Date End Date Taking?  Authorizing Provider   metoprolol succinate (TOPROL XL) 25 MG extended release tablet Take 1 tablet by mouth daily 6/11/19  Yes CHARISSE Hardy CNP   propafenone (RYTHMOL) 225 MG tablet Take 1 tablet by mouth every 8 hours 5/23/19  Yes Romi Lamar MD [place]   rivaroxaban (XARELTO) 20 MG TABS tablet Take 1 tablet by mouth daily (with breakfast) 7/23/18  Yes Stefanie Kearney MD       Allergies:  Bee venom and Adhesive tape    Social History:   reports that he quit smoking about 31 years ago. He has a 45.00 pack-year smoking history. He has never used smokeless tobacco. He reports that he does not drink alcohol or use drugs. Family History: family history includes Cancer in his maternal grandmother, maternal uncle, and mother; Emphysema in his father; Heart Failure in his maternal grandfather. Review of Systems   Constitutional: Negative  HENT: Negative. Eyes: Negative. Respiratory: + chronic SOB with exertion  Cardiovascular: see HPI  Gastrointestinal: Negative. Genitourinary: Negative. Musculoskeletal: Negative. Skin: Negative. Neurological: Negative. Hematological: Negative. Psychiatric/Behavioral: Negative. PHYSICAL EXAM:    Vital signs:    /82   Pulse 64   Ht 5' 11\" (1.803 m)   Wt (!) 316 lb (143.3 kg)   BMI 44.07 kg/m²      Constitutional and general appearance: alert, cooperative, no distress and appears stated age  [de-identified]: PERRL, no cervical lymphadenopathy. No masses palpable.  Normal oral mucosa  Respiratory:  · Normal excursion and expansion without use of accessory muscles  · Resp auscultation: Normal breath sounds without wheezing, rhonchi, and rales  Cardiovascular:     · The apical impulse is not displaced  · Heart tones are crisp and normal. Regular S1 and S2.  · Jugular venous pulsation Normal  · The carotid upstroke is normal in amplitude and contour without delay or bruit  · Peripheral pulses are symmetrical and full   Abdomen:  · No masses or tenderness  · Bowel sounds present  Extremities:  ·  No cyanosis or clubbing  ·  Trace lower extremity edema  ·  Skin: warm and dry  Neurological:  · Alert and oriented  · Moves all extremities well  · No abnormalities of mood, affect, memory,

## 2019-08-26 ENCOUNTER — TELEPHONE (OUTPATIENT)
Dept: CARDIOLOGY CLINIC | Age: 69
End: 2019-08-26

## 2019-08-27 ENCOUNTER — OFFICE VISIT (OUTPATIENT)
Dept: FAMILY MEDICINE CLINIC | Age: 69
End: 2019-08-27
Payer: MEDICARE

## 2019-08-27 VITALS
DIASTOLIC BLOOD PRESSURE: 70 MMHG | BODY MASS INDEX: 44.21 KG/M2 | HEART RATE: 74 BPM | OXYGEN SATURATION: 98 % | TEMPERATURE: 98.1 F | SYSTOLIC BLOOD PRESSURE: 108 MMHG | WEIGHT: 315 LBS

## 2019-08-27 DIAGNOSIS — Z01.818 PREOP EXAMINATION: Primary | ICD-10-CM

## 2019-08-27 PROCEDURE — 93000 ELECTROCARDIOGRAM COMPLETE: CPT | Performed by: NURSE PRACTITIONER

## 2019-08-27 PROCEDURE — 99214 OFFICE O/P EST MOD 30 MIN: CPT | Performed by: NURSE PRACTITIONER

## 2019-08-27 ASSESSMENT — ENCOUNTER SYMPTOMS
CHEST TIGHTNESS: 0
BACK PAIN: 0
SINUS PRESSURE: 0
ABDOMINAL PAIN: 0
VOICE CHANGE: 0
STRIDOR: 0
EYE ITCHING: 0
EYE PAIN: 0
COUGH: 0
WHEEZING: 0
EYE REDNESS: 0
SINUS PAIN: 0
DIARRHEA: 0
VOMITING: 0
TROUBLE SWALLOWING: 0
COLOR CHANGE: 0
SHORTNESS OF BREATH: 0
SORE THROAT: 0
CHOKING: 0
PHOTOPHOBIA: 0
CONSTIPATION: 0
RHINORRHEA: 0
NAUSEA: 0
EYE DISCHARGE: 0
BLOOD IN STOOL: 0

## 2019-08-27 NOTE — PROGRESS NOTES
Johnie Spatz  YOB: 1950    Date of Service:  8/27/2019        Wt Readings from Last 2 Encounters:   08/27/19 (!) 317 lb (143.8 kg)   07/25/19 (!) 316 lb (143.3 kg)       BP Readings from Last 3 Encounters:   08/27/19 108/70   07/25/19 124/82   06/25/19 128/78        Chief Complaint   Patient presents with    Pre-op Exam     left 5th toe surgery at [facility]     Shoulder Pain     right shoulder        Allergies   Allergen Reactions    Bee Venom     Adhesive Tape Rash       Outpatient Medications Marked as Taking for the 8/27/19 encounter (Office Visit) with CHARISSE Hoover CNP   Medication Sig Dispense Refill    propafenone (RYTHMOL) 225 MG tablet Take 1 tablet by mouth every 8 hours 270 tablet 3    metoprolol succinate (TOPROL XL) 25 MG extended release tablet Take 1 tablet by mouth daily 90 tablet 3    rivaroxaban (XARELTO) 20 MG TABS tablet Take 1 tablet by mouth daily (with breakfast) 90 tablet 3       This patient presents to the office todayfor a preoperative consultation at the request of surgeon, Dr. Hui Gutierrez, who plans on performing Shave toe due to corn, depends when she \"gets in there\" on to be determined at [facility]. The current problem began2 years ago, and symptoms have been worsening with time. Conservative therapy: Yes: he had it shaved multiple times and it has returned, which has been ineffective. .    Planned anesthesia: General   Known anesthesia problems: None   Bleeding risk: Anticoagulant therapy- Xarelto ( Surgeon stated to stop taking 3 days before surgery, he has contacted his cardiologist but has not heard from them back)  Personal or FH of DVT/PE: No    Patient objection to receiving blood products: No    Past Medical History:   Diagnosis Date    Arthritis     Atrial fibrillation ([address]     Hiatal hernia     Hypertrophy of prostate without urinary obstruction and other lower urinary tract symptoms (LUTS)     Kidney stone     2001    Neoplasm of uncertain file     Relationship status: Not on file    Intimate partner violence:     Fear of current or ex partner: Not on file     Emotionally abused: Not on file     Physically abused: Not on file     Forced sexual activity: Not on file   Other Topics Concern    Not on file   Social History Narrative    Not on file      In office today he mention a bumb on his tight bicep. When palpated feels like a cyst, it is moveable and tender. It hurts when he moves the wrong direciton. Nodule is slightly smaller than a golfball. Review of Systems  Review of Systems   Constitutional: Negative for activity change, appetite change, chills, diaphoresis, fatigue, fever and unexpected weight change. HENT: Negative for congestion, ear discharge, ear pain, hearing loss, nosebleeds, postnasal drip, rhinorrhea, sinus pressure, sinus pain, sneezing, sore throat, tinnitus, trouble swallowing and voice change. Eyes: Negative for photophobia, pain, discharge, redness and itching. Respiratory: Negative for cough, choking, chest tightness, shortness of breath, wheezing and stridor. Cardiovascular: Negative for chest pain, palpitations and leg swelling. Gastrointestinal: Negative for abdominal pain, blood in stool, constipation, diarrhea, nausea and vomiting. Endocrine: Negative for cold intolerance, heat intolerance, polydipsia and polyuria. Genitourinary: Negative for difficulty urinating, dysuria, enuresis, flank pain, frequency, hematuria and urgency. Musculoskeletal: Negative for back pain, gait problem, joint swelling, neck pain and neck stiffness. Skin: Negative for color change, pallor, rash and wound. Allergic/Immunologic: Negative for environmental allergies and food allergies. Neurological: Negative for dizziness, tremors, syncope, speech difficulty, weakness, light-headedness, numbness and headaches. Hematological: Negative for adenopathy. Does not bruise/bleed easily.    Psychiatric/Behavioral: Negative for

## 2019-08-28 ENCOUNTER — TELEPHONE (OUTPATIENT)
Dept: CARDIOLOGY CLINIC | Age: 69
End: 2019-08-28

## 2019-08-28 NOTE — TELEPHONE ENCOUNTER
Left message for patient. May proceed with surgery. He should hold Xarelto no more than 2 days prior. Letter typed and in epic.

## 2019-10-29 ENCOUNTER — OFFICE VISIT (OUTPATIENT)
Dept: CARDIOLOGY CLINIC | Age: 69
End: 2019-10-29
Payer: MEDICARE

## 2019-10-29 VITALS
HEART RATE: 84 BPM | BODY MASS INDEX: 44.1 KG/M2 | WEIGHT: 315 LBS | SYSTOLIC BLOOD PRESSURE: 128 MMHG | DIASTOLIC BLOOD PRESSURE: 90 MMHG | OXYGEN SATURATION: 97 % | HEIGHT: 71 IN

## 2019-10-29 DIAGNOSIS — I48.19 PERSISTENT ATRIAL FIBRILLATION (HCC): Primary | ICD-10-CM

## 2019-10-29 PROCEDURE — 93000 ELECTROCARDIOGRAM COMPLETE: CPT | Performed by: NURSE PRACTITIONER

## 2019-10-29 PROCEDURE — 99214 OFFICE O/P EST MOD 30 MIN: CPT | Performed by: NURSE PRACTITIONER

## 2019-10-31 ENCOUNTER — TELEPHONE (OUTPATIENT)
Dept: CARDIOLOGY CLINIC | Age: 69
End: 2019-10-31

## 2019-10-31 RX ORDER — PROPAFENONE HYDROCHLORIDE 300 MG/1
300 TABLET, COATED ORAL EVERY 8 HOURS
Qty: 270 TABLET | Refills: 3 | Status: SHIPPED | OUTPATIENT
Start: 2019-10-31 | End: 2019-11-07 | Stop reason: ALTCHOICE

## 2019-11-05 ENCOUNTER — TELEPHONE (OUTPATIENT)
Dept: CARDIOLOGY CLINIC | Age: 69
End: 2019-11-05

## 2019-11-07 RX ORDER — METOPROLOL SUCCINATE 25 MG/1
25 TABLET, EXTENDED RELEASE ORAL DAILY
Qty: 30 TABLET | Refills: 11 | Status: ON HOLD | OUTPATIENT
Start: 2019-11-07 | End: 2019-11-20 | Stop reason: HOSPADM

## 2019-11-18 ENCOUNTER — HOSPITAL ENCOUNTER (INPATIENT)
Age: 69
LOS: 2 days | Discharge: HOME | DRG: 309 | End: 2019-11-20
Attending: INTERNAL MEDICINE | Admitting: INTERNAL MEDICINE
Payer: MEDICARE

## 2019-11-18 PROBLEM — I48.91 ATRIAL FIBRILLATION (HCC): Status: ACTIVE | Noted: 2019-11-18

## 2019-11-18 LAB
EKG ATRIAL RATE: 357 BPM
EKG ATRIAL RATE: 441 BPM
EKG DIAGNOSIS: NORMAL
EKG DIAGNOSIS: NORMAL
EKG Q-T INTERVAL: 358 MS
EKG Q-T INTERVAL: 412 MS
EKG QRS DURATION: 82 MS
EKG QRS DURATION: 84 MS
EKG QTC CALCULATION (BAZETT): 430 MS
EKG QTC CALCULATION (BAZETT): 435 MS
EKG R AXIS: 11 DEGREES
EKG R AXIS: 12 DEGREES
EKG T AXIS: 10 DEGREES
EKG T AXIS: 14 DEGREES
EKG VENTRICULAR RATE: 67 BPM
EKG VENTRICULAR RATE: 87 BPM
TROPONIN: <0.01 NG/ML
TROPONIN: <0.01 NG/ML

## 2019-11-18 PROCEDURE — 93005 ELECTROCARDIOGRAM TRACING: CPT | Performed by: INTERNAL MEDICINE

## 2019-11-18 PROCEDURE — 2060000000 HC ICU INTERMEDIATE R&B

## 2019-11-18 PROCEDURE — 2580000003 HC RX 258: Performed by: INTERNAL MEDICINE

## 2019-11-18 PROCEDURE — 93010 ELECTROCARDIOGRAM REPORT: CPT | Performed by: INTERNAL MEDICINE

## 2019-11-18 PROCEDURE — 36415 COLL VENOUS BLD VENIPUNCTURE: CPT

## 2019-11-18 PROCEDURE — 84484 ASSAY OF TROPONIN QUANT: CPT

## 2019-11-18 PROCEDURE — 6370000000 HC RX 637 (ALT 250 FOR IP): Performed by: INTERNAL MEDICINE

## 2019-11-18 RX ORDER — ONDANSETRON 2 MG/ML
4 INJECTION INTRAMUSCULAR; INTRAVENOUS EVERY 6 HOURS PRN
Status: DISCONTINUED | OUTPATIENT
Start: 2019-11-18 | End: 2019-11-18

## 2019-11-18 RX ORDER — SODIUM CHLORIDE 0.9 % (FLUSH) 0.9 %
10 SYRINGE (ML) INJECTION EVERY 12 HOURS SCHEDULED
Status: DISCONTINUED | OUTPATIENT
Start: 2019-11-18 | End: 2019-11-20 | Stop reason: HOSPADM

## 2019-11-18 RX ORDER — ACETAMINOPHEN 325 MG/1
650 TABLET ORAL EVERY 6 HOURS PRN
Status: DISCONTINUED | OUTPATIENT
Start: 2019-11-18 | End: 2019-11-20 | Stop reason: HOSPADM

## 2019-11-18 RX ORDER — ASPIRIN 81 MG/1
81 TABLET, CHEWABLE ORAL DAILY
Status: DISCONTINUED | OUTPATIENT
Start: 2019-11-18 | End: 2019-11-19

## 2019-11-18 RX ORDER — SOTALOL HYDROCHLORIDE 80 MG/1
120 TABLET ORAL 2 TIMES DAILY
Status: DISCONTINUED | OUTPATIENT
Start: 2019-11-18 | End: 2019-11-20 | Stop reason: HOSPADM

## 2019-11-18 RX ORDER — SODIUM CHLORIDE 0.9 % (FLUSH) 0.9 %
10 SYRINGE (ML) INJECTION PRN
Status: DISCONTINUED | OUTPATIENT
Start: 2019-11-18 | End: 2019-11-20 | Stop reason: HOSPADM

## 2019-11-18 RX ADMIN — ASPIRIN 81 MG 81 MG: 81 TABLET ORAL at 10:34

## 2019-11-18 RX ADMIN — SOTALOL HYDROCHLORIDE 120 MG: 80 TABLET ORAL at 20:59

## 2019-11-18 RX ADMIN — Medication 10 ML: at 10:35

## 2019-11-18 RX ADMIN — ACETAMINOPHEN 650 MG: 325 TABLET ORAL at 21:00

## 2019-11-18 RX ADMIN — SOTALOL HYDROCHLORIDE 120 MG: 80 TABLET ORAL at 11:22

## 2019-11-18 RX ADMIN — RIVAROXABAN 20 MG: 20 TABLET, FILM COATED ORAL at 17:23

## 2019-11-18 RX ADMIN — Medication 10 ML: at 21:01

## 2019-11-18 ASSESSMENT — PAIN SCALES - GENERAL
PAINLEVEL_OUTOF10: 3
PAINLEVEL_OUTOF10: 0

## 2019-11-19 LAB
ANION GAP SERPL CALCULATED.3IONS-SCNC: 10 MMOL/L (ref 3–16)
ANION GAP SERPL CALCULATED.3IONS-SCNC: 7 MMOL/L (ref 3–16)
BUN BLDV-MCNC: 18 MG/DL (ref 7–20)
BUN BLDV-MCNC: 19 MG/DL (ref 7–20)
CALCIUM SERPL-MCNC: 8.5 MG/DL (ref 8.3–10.6)
CALCIUM SERPL-MCNC: 8.7 MG/DL (ref 8.3–10.6)
CHLORIDE BLD-SCNC: 102 MMOL/L (ref 99–110)
CHLORIDE BLD-SCNC: 105 MMOL/L (ref 99–110)
CO2: 29 MMOL/L (ref 21–32)
CO2: 30 MMOL/L (ref 21–32)
CREAT SERPL-MCNC: 0.8 MG/DL (ref 0.8–1.3)
CREAT SERPL-MCNC: 0.9 MG/DL (ref 0.8–1.3)
GFR AFRICAN AMERICAN: >60
GFR AFRICAN AMERICAN: >60
GFR NON-AFRICAN AMERICAN: >60
GFR NON-AFRICAN AMERICAN: >60
GLUCOSE BLD-MCNC: 94 MG/DL (ref 70–99)
GLUCOSE BLD-MCNC: 94 MG/DL (ref 70–99)
HCT VFR BLD CALC: 42 % (ref 40.5–52.5)
HEMOGLOBIN: 14.5 G/DL (ref 13.5–17.5)
MAGNESIUM: 2.2 MG/DL (ref 1.8–2.4)
MCH RBC QN AUTO: 31.8 PG (ref 26–34)
MCHC RBC AUTO-ENTMCNC: 34.5 G/DL (ref 31–36)
MCV RBC AUTO: 92.3 FL (ref 80–100)
PDW BLD-RTO: 14.7 % (ref 12.4–15.4)
PLATELET # BLD: 153 K/UL (ref 135–450)
PMV BLD AUTO: 6.7 FL (ref 5–10.5)
POTASSIUM REFLEX MAGNESIUM: 4.2 MMOL/L (ref 3.5–5.1)
POTASSIUM SERPL-SCNC: 4.6 MMOL/L (ref 3.5–5.1)
RBC # BLD: 4.55 M/UL (ref 4.2–5.9)
SODIUM BLD-SCNC: 141 MMOL/L (ref 136–145)
SODIUM BLD-SCNC: 142 MMOL/L (ref 136–145)
WBC # BLD: 5.9 K/UL (ref 4–11)

## 2019-11-19 PROCEDURE — 80048 BASIC METABOLIC PNL TOTAL CA: CPT

## 2019-11-19 PROCEDURE — 2580000003 HC RX 258: Performed by: INTERNAL MEDICINE

## 2019-11-19 PROCEDURE — 85027 COMPLETE CBC AUTOMATED: CPT

## 2019-11-19 PROCEDURE — 93005 ELECTROCARDIOGRAM TRACING: CPT | Performed by: INTERNAL MEDICINE

## 2019-11-19 PROCEDURE — 99233 SBSQ HOSP IP/OBS HIGH 50: CPT | Performed by: NURSE PRACTITIONER

## 2019-11-19 PROCEDURE — 83735 ASSAY OF MAGNESIUM: CPT

## 2019-11-19 PROCEDURE — 2060000000 HC ICU INTERMEDIATE R&B

## 2019-11-19 PROCEDURE — 36415 COLL VENOUS BLD VENIPUNCTURE: CPT

## 2019-11-19 PROCEDURE — 6370000000 HC RX 637 (ALT 250 FOR IP): Performed by: INTERNAL MEDICINE

## 2019-11-19 RX ADMIN — Medication 10 ML: at 08:57

## 2019-11-19 RX ADMIN — RIVAROXABAN 20 MG: 20 TABLET, FILM COATED ORAL at 18:07

## 2019-11-19 RX ADMIN — SOTALOL HYDROCHLORIDE 120 MG: 80 TABLET ORAL at 08:56

## 2019-11-19 RX ADMIN — SOTALOL HYDROCHLORIDE 120 MG: 80 TABLET ORAL at 20:56

## 2019-11-19 RX ADMIN — Medication 10 ML: at 20:57

## 2019-11-19 RX ADMIN — ASPIRIN 81 MG 81 MG: 81 TABLET ORAL at 08:56

## 2019-11-20 ENCOUNTER — APPOINTMENT (OUTPATIENT)
Dept: CARDIAC CATH/INVASIVE PROCEDURES | Age: 69
DRG: 309 | End: 2019-11-20
Attending: INTERNAL MEDICINE
Payer: MEDICARE

## 2019-11-20 VITALS
WEIGHT: 312.8 LBS | BODY MASS INDEX: 43.79 KG/M2 | OXYGEN SATURATION: 97 % | HEIGHT: 71 IN | DIASTOLIC BLOOD PRESSURE: 74 MMHG | SYSTOLIC BLOOD PRESSURE: 115 MMHG | HEART RATE: 57 BPM | RESPIRATION RATE: 16 BRPM | TEMPERATURE: 97.6 F

## 2019-11-20 LAB
ANION GAP SERPL CALCULATED.3IONS-SCNC: 11 MMOL/L (ref 3–16)
BUN BLDV-MCNC: 19 MG/DL (ref 7–20)
CALCIUM SERPL-MCNC: 8.5 MG/DL (ref 8.3–10.6)
CHLORIDE BLD-SCNC: 105 MMOL/L (ref 99–110)
CO2: 25 MMOL/L (ref 21–32)
CREAT SERPL-MCNC: 0.9 MG/DL (ref 0.8–1.3)
GFR AFRICAN AMERICAN: >60
GFR NON-AFRICAN AMERICAN: >60
GLUCOSE BLD-MCNC: 92 MG/DL (ref 70–99)
MAGNESIUM: 2.2 MG/DL (ref 1.8–2.4)
POTASSIUM SERPL-SCNC: 4.3 MMOL/L (ref 3.5–5.1)
SODIUM BLD-SCNC: 141 MMOL/L (ref 136–145)

## 2019-11-20 PROCEDURE — 99233 SBSQ HOSP IP/OBS HIGH 50: CPT | Performed by: INTERNAL MEDICINE

## 2019-11-20 PROCEDURE — 6370000000 HC RX 637 (ALT 250 FOR IP): Performed by: INTERNAL MEDICINE

## 2019-11-20 PROCEDURE — 6360000002 HC RX W HCPCS

## 2019-11-20 PROCEDURE — 36415 COLL VENOUS BLD VENIPUNCTURE: CPT

## 2019-11-20 PROCEDURE — 93005 ELECTROCARDIOGRAM TRACING: CPT | Performed by: INTERNAL MEDICINE

## 2019-11-20 PROCEDURE — 2580000003 HC RX 258

## 2019-11-20 PROCEDURE — 2580000003 HC RX 258: Performed by: INTERNAL MEDICINE

## 2019-11-20 PROCEDURE — 92960 CARDIOVERSION ELECTRIC EXT: CPT

## 2019-11-20 PROCEDURE — 80048 BASIC METABOLIC PNL TOTAL CA: CPT

## 2019-11-20 PROCEDURE — 5A2204Z RESTORATION OF CARDIAC RHYTHM, SINGLE: ICD-10-PCS | Performed by: INTERNAL MEDICINE

## 2019-11-20 PROCEDURE — 83735 ASSAY OF MAGNESIUM: CPT

## 2019-11-20 PROCEDURE — 92960 CARDIOVERSION ELECTRIC EXT: CPT | Performed by: INTERNAL MEDICINE

## 2019-11-20 RX ORDER — MIDAZOLAM HYDROCHLORIDE 1 MG/ML
3 INJECTION INTRAMUSCULAR; INTRAVENOUS ONCE
Status: COMPLETED | OUTPATIENT
Start: 2019-11-20 | End: 2019-11-20

## 2019-11-20 RX ORDER — SOTALOL HYDROCHLORIDE 120 MG/1
120 TABLET ORAL 2 TIMES DAILY
Qty: 60 TABLET | Refills: 5 | Status: ON HOLD | OUTPATIENT
Start: 2019-11-20 | End: 2020-01-23 | Stop reason: HOSPADM

## 2019-11-20 RX ORDER — FENTANYL CITRATE 50 UG/ML
100 INJECTION, SOLUTION INTRAMUSCULAR; INTRAVENOUS ONCE
Status: COMPLETED | OUTPATIENT
Start: 2019-11-20 | End: 2019-11-20

## 2019-11-20 RX ORDER — MIDAZOLAM HYDROCHLORIDE 1 MG/ML
1 INJECTION INTRAMUSCULAR; INTRAVENOUS ONCE
Status: COMPLETED | OUTPATIENT
Start: 2019-11-20 | End: 2019-11-20

## 2019-11-20 RX ADMIN — ACETAMINOPHEN 650 MG: 325 TABLET ORAL at 01:45

## 2019-11-20 RX ADMIN — Medication 10 ML: at 08:55

## 2019-11-20 RX ADMIN — MIDAZOLAM HYDROCHLORIDE 3 MG: 1 INJECTION INTRAMUSCULAR; INTRAVENOUS at 13:43

## 2019-11-20 RX ADMIN — MIDAZOLAM HYDROCHLORIDE 1 MG: 1 INJECTION INTRAMUSCULAR; INTRAVENOUS at 13:46

## 2019-11-20 RX ADMIN — FENTANYL CITRATE 100 MCG: 50 INJECTION, SOLUTION INTRAMUSCULAR; INTRAVENOUS at 13:42

## 2019-11-20 RX ADMIN — SOTALOL HYDROCHLORIDE 120 MG: 80 TABLET ORAL at 08:55

## 2019-11-20 ASSESSMENT — PAIN SCALES - GENERAL
PAINLEVEL_OUTOF10: 3
PAINLEVEL_OUTOF10: 0

## 2019-11-21 LAB
EKG ATRIAL RATE: 202 BPM
EKG ATRIAL RATE: 220 BPM
EKG ATRIAL RATE: 81 BPM
EKG ATRIAL RATE: 83 BPM
EKG ATRIAL RATE: 83 BPM
EKG DIAGNOSIS: NORMAL
EKG Q-T INTERVAL: 414 MS
EKG Q-T INTERVAL: 420 MS
EKG Q-T INTERVAL: 422 MS
EKG Q-T INTERVAL: 428 MS
EKG Q-T INTERVAL: 436 MS
EKG QRS DURATION: 82 MS
EKG QRS DURATION: 84 MS
EKG QRS DURATION: 86 MS
EKG QTC CALCULATION (BAZETT): 441 MS
EKG QTC CALCULATION (BAZETT): 450 MS
EKG QTC CALCULATION (BAZETT): 452 MS
EKG QTC CALCULATION (BAZETT): 456 MS
EKG QTC CALCULATION (BAZETT): 502 MS
EKG R AXIS: 12 DEGREES
EKG R AXIS: 13 DEGREES
EKG R AXIS: 19 DEGREES
EKG R AXIS: 23 DEGREES
EKG R AXIS: 5 DEGREES
EKG T AXIS: -8 DEGREES
EKG T AXIS: 11 DEGREES
EKG T AXIS: 17 DEGREES
EKG T AXIS: 3 DEGREES
EKG T AXIS: 5 DEGREES
EKG VENTRICULAR RATE: 64 BPM
EKG VENTRICULAR RATE: 69 BPM
EKG VENTRICULAR RATE: 69 BPM
EKG VENTRICULAR RATE: 73 BPM
EKG VENTRICULAR RATE: 80 BPM

## 2019-11-22 ENCOUNTER — TELEPHONE (OUTPATIENT)
Dept: FAMILY MEDICINE CLINIC | Age: 69
End: 2019-11-22

## 2019-12-26 ENCOUNTER — OFFICE VISIT (OUTPATIENT)
Dept: CARDIOLOGY CLINIC | Age: 69
End: 2019-12-26
Payer: MEDICARE

## 2019-12-26 VITALS
OXYGEN SATURATION: 97 % | SYSTOLIC BLOOD PRESSURE: 118 MMHG | DIASTOLIC BLOOD PRESSURE: 70 MMHG | BODY MASS INDEX: 44.1 KG/M2 | WEIGHT: 315 LBS | HEART RATE: 74 BPM | HEIGHT: 71 IN

## 2019-12-26 DIAGNOSIS — I48.19 PERSISTENT ATRIAL FIBRILLATION (HCC): Primary | ICD-10-CM

## 2019-12-26 PROCEDURE — 93000 ELECTROCARDIOGRAM COMPLETE: CPT | Performed by: NURSE PRACTITIONER

## 2019-12-26 PROCEDURE — 99213 OFFICE O/P EST LOW 20 MIN: CPT | Performed by: NURSE PRACTITIONER

## 2020-01-09 ENCOUNTER — OFFICE VISIT (OUTPATIENT)
Dept: FAMILY MEDICINE CLINIC | Age: 70
End: 2020-01-09
Payer: COMMERCIAL

## 2020-01-09 VITALS
HEIGHT: 71 IN | WEIGHT: 315 LBS | OXYGEN SATURATION: 98 % | BODY MASS INDEX: 44.1 KG/M2 | DIASTOLIC BLOOD PRESSURE: 74 MMHG | SYSTOLIC BLOOD PRESSURE: 128 MMHG | HEART RATE: 61 BPM

## 2020-01-09 PROCEDURE — 99213 OFFICE O/P EST LOW 20 MIN: CPT | Performed by: FAMILY MEDICINE

## 2020-01-09 RX ORDER — SULFAMETHOXAZOLE AND TRIMETHOPRIM 400; 80 MG/1; MG/1
1 TABLET ORAL 2 TIMES DAILY
Qty: 20 TABLET | Refills: 0 | Status: SHIPPED | OUTPATIENT
Start: 2020-01-09 | End: 2020-01-19

## 2020-01-09 ASSESSMENT — ENCOUNTER SYMPTOMS
CONSTIPATION: 0
BLOOD IN STOOL: 0
CHEST TIGHTNESS: 0
SHORTNESS OF BREATH: 0
DIARRHEA: 0

## 2020-01-09 ASSESSMENT — PATIENT HEALTH QUESTIONNAIRE - PHQ9
2. FEELING DOWN, DEPRESSED OR HOPELESS: 0
SUM OF ALL RESPONSES TO PHQ9 QUESTIONS 1 & 2: 0
1. LITTLE INTEREST OR PLEASURE IN DOING THINGS: 0
SUM OF ALL RESPONSES TO PHQ QUESTIONS 1-9: 0
SUM OF ALL RESPONSES TO PHQ QUESTIONS 1-9: 0

## 2020-01-09 NOTE — PROGRESS NOTES
(!) 324 lb (147 kg)   12/26/19 (!) 326 lb 4 oz (148 kg)   11/20/19 (!) 312 lb 12.8 oz (141.9 kg)        Physical Exam  Constitutional:       Appearance: He is well-developed. HENT:      Head: Normocephalic and atraumatic. Right Ear: Hearing normal.      Left Ear: Hearing normal.   Eyes:      Conjunctiva/sclera: Conjunctivae normal.   Neck:      Trachea: No tracheal deviation. Cardiovascular:      Rate and Rhythm: Normal rate. Rhythm irregularly irregular. Heart sounds: No murmur. No friction rub. No gallop. Pulmonary:      Effort: Pulmonary effort is normal.      Breath sounds: Normal breath sounds. Skin:     General: Skin is warm and dry. Neurological:      Mental Status: He is alert and oriented to person, place, and time. Psychiatric:         Mood and Affect: Mood normal.         Behavior: Behavior normal.           ASSESSMENT/PLAN:    1. Cellulitis and abscess of buttock  In similar location to prior. meds as below. Consider referral to wound care or surgery if recurrent issues or fails to heal.    - mupirocin (BACTROBAN) 2 % ointment; Apply 3 times daily. Dispense: 1 Tube; Refill: 0  - sulfamethoxazole-trimethoprim (BACTRIM) 400-80 MG per tablet; Take 1 tablet by mouth 2 times daily for 10 days  Dispense: 20 tablet; Refill: 0    2. Persistent atrial fibrillation  Considering ablation. Discussed. Pt plans to proceed. Scribe attestation:I, Fish KWON, am scribing for and in the presence of Nirav Carranza MD. Electronically signed by Fish KWON, on 1/9/2020 at 2:47 PM      Provider attestation: Baron Chaidez MD, personally performed the services scribed by the user listed above in my presence, and it is both accurate and complete. I agree with the ROS and Past Histories independently gathered by the clinical support staff and the remaining scribed note accurately describes my personal service to the patient.     [facility]    1/9/2020  2:48 PM

## 2020-01-13 NOTE — PROGRESS NOTES
[facility]   Electrophysiology Note      Reason for office visit: New Patient; Atrial Fibrillation; Shortness of Breath; and Fatigue      HISTORY OF PRESENT ILLNESS: Travis Moore is a 68 y.o. male who presents for follow up of atrial fibrillation. He was initially diagnosed with afib incidentally in June 2018. He underwent a DCCV 8/16/18. He remained in sinus rhythm until his follow up visit 11/2018. He remained in afib, and underwent a stress test 4/2019 that was negative. He was then started on Rythmol on 5/21/19. At his follow up visit 6/2019, he was noted to be in afib with a heart rate of 119. He was then started on Toprol 25 mg QD for rate control and underwent a DCCV on 6/25/19. At his follow up visit on 10/29/19, his Rythmol was increased to 300 mg Q8H. He was then admitted and underwent a repeat cardioversion on 11/20/19. He was started on Sotalol 11/2019. His EKG on 12/26/19 showed afib rate 72. He remained symptomatic with his afib feeling short of breath and fatigue. His EKG from today shows atrial fibrillation rate 84. Today he reports that he has not been feeling as well as he would hope. When he was discharged, he felt that he was gaining energy and was able to be more active. This went on for a few weeks. He now feels that he has become more short of breath and is lacking energy. His activity is limited due to his breathing. He attributes his symptoms to his atrial fibrillation. He has struggled with losing weight for years. He has been diagnosed with SAM though is intolerant to his prescribed CPAP. He has been taking his medications as prescribed and tolerates them well. Patient currently denies any weight gain, edema, palpitations, chest pain, dizziness, and syncope.      Past Medical History:   has a past medical history of Arthritis, Atrial fibrillation ([address] Hiatal hernia, Hypertrophy of prostate without urinary obstruction and other lower urinary tract symptoms (LUTS), without use of accessory muscles  · Resp Auscultation: Normal breath sounds without dullness or wheezing  Cardiovascular:  · The apical impulse is not displaced  · Heart tones are crisp and normal. Irregular rate and rhythm  Abdomen:  · No masses or tenderness  · Bowel sounds present  Extremities:  ·  No Cyanosis or Clubbing  ·  Lower extremity edema: No  · Skin: Warm and dry  Neurological:  · Alert and oriented. · Moves all extremities well  · No abnormalities of mood, affect, memory, mentation, or behavior are noted    DATA:    EC2020 Atrial fibrillation 84  ECHO: 18  Summary   Low normal left ventricular systolic function with an estimated ejection   fraction of 50-55%. Normal left ventricular diastolic filling pressure. Right ventricular systolic function is mildly reduced to low normal.   The right ventricle is mildly enlarged. The right atrium is mildly dilated. Mild mitral regurgitation. Mild aortic regurgitation. Mild tricuspid regurgitation. Normal systolic pulmonary artery pressure (SPAP) estimated at 36 mmHg (RA   pressure 8 mmHg). Mild pulmonic regurgitation. IMPRESSION:    1. Persistent atrial fibrillation   Mr. Kelvin Garcia is a pleasant 68year old male with a medical history significant for persistent symptomatic atrial fibrillation on sotalol, morbid obesity, and obstructive sleep apnea who presents with symptomatic atrial fibrillation with shortness of breath and dyspnea on exertion. He is compliant with his anticoagulation and sotalol however continues to suffer from symptomatic atrial fibrillation. He has undergone multiple cardioversions with improved symptoms on 2018 and 2019We discussed atrial fibrillation ablation, other antiarrhythmic therapy (dofetilide), and pacemaker. He has a brother that underwent afib ablation and is on dofetilide. We also discussed gastric bypass.   He would like to proceed with switch to dofetilide and an atrial fibrillation ablation. We will arrange. From stroke perspective he is at low to intermediate risk for stroke given [id number] score of 1 for age. 2. Obesity   ~Body mass index is 45.24 kg/m². 3. SAM   ~intolerant to mask     RECOMMENDATIONS:  1. Discussed antiarrythmic therapy in addition to ablation therapy. 2. Symptomatic paroxysmal atrial fibrillation, failed antiarrhythmic therapy and also cardioversion in the past.     I have discussed his treatment options including changing antiarrhythmics, cardioversion and/or ablation in detail with patient. Atrial fibrillation ablation was discussed in detail with patient. Risk, benefit, alternative, rationale of the procedure were discussed with the patient which included but were not limited to allergic reaction to the medications, pain, bleeding, infection, nerve injury, injury to diaphragm(breathing muscle), pulmonary embolus(blood clot in lungs), deep vein blood clot, neumothorax, hemothorax, acute renal failure, cardiac perforation,  tamponade, need for emergent surgery (open heart), permanent pacemaker, pulmonary vein stenosis, left atrial to esophageal fistula, stroke, myocardial infarction and death. Explained that there is no cure for AF. On average patients needs two ablation procedures. The success rate with one radiofrequency catheter ablation procedure and or Cryoablation is about 65-70%. Explained to patient that discontinuation of anticoagulation is not an indication for the procedure. Given the complex nature of the disease no guarantee was given on the success of the procedure    Patient verbalized the understanding of the risks, benefit, alternatives, rationale of the procedure  3. Call you insurance company and ask if Tikosyn is covered and see if this will be affordable for you. If it is, well will likely start this prior to ablation. 4. Follow up after procedure. QUALITY MEASURES  1. Tobacco Cessation Counseling: NA  2.  Retake

## 2020-01-14 ENCOUNTER — OFFICE VISIT (OUTPATIENT)
Dept: CARDIOLOGY CLINIC | Age: 70
End: 2020-01-14
Payer: COMMERCIAL

## 2020-01-14 ENCOUNTER — TELEPHONE (OUTPATIENT)
Dept: CARDIOLOGY CLINIC | Age: 70
End: 2020-01-14

## 2020-01-14 VITALS
BODY MASS INDEX: 44.1 KG/M2 | DIASTOLIC BLOOD PRESSURE: 80 MMHG | WEIGHT: 315 LBS | SYSTOLIC BLOOD PRESSURE: 122 MMHG | HEART RATE: 90 BPM | OXYGEN SATURATION: 98 % | HEIGHT: 71 IN

## 2020-01-14 PROCEDURE — 93000 ELECTROCARDIOGRAM COMPLETE: CPT | Performed by: INTERNAL MEDICINE

## 2020-01-14 PROCEDURE — 99214 OFFICE O/P EST MOD 30 MIN: CPT | Performed by: INTERNAL MEDICINE

## 2020-01-14 NOTE — TELEPHONE ENCOUNTER
That's great news. I called and left VM. Please help me schedule patient for dofetilide admission on a Monday followed by atrial fibrillation ablation on a Wednesday. Thanks for help.

## 2020-01-14 NOTE — PATIENT INSTRUCTIONS
RECOMMENDATIONS:  1. Discussed antiarrythmic therapy in addition to ablation therapy. 2. Symptomatic paroxysmal atrial fibrillation, failed antiarrhythmic therapy and also cardioversion in the past.     I have discussed his treatment options including changing antiarrhythmics, cardioversion and/or ablation in detail with patient. Atrial fibrillation ablation was discussed in detail with patient. Risk, benefit, alternative, rationale of the procedure were discussed with the patient which included but were not limited to allergic reaction to the medications, pain, bleeding, infection, nerve injury, injury to diaphragm(breathing muscle), pulmonary embolus(blood clot in lungs), deep vein blood clot, neumothorax, hemothorax, acute renal failure, cardiac perforation,  tamponade, need for emergent surgery (open heart), permanent pacemaker, pulmonary vein stenosis, left atrial to esophageal fistula, stroke, myocardial infarction and death. Explained that there is no cure for AF. On average patients needs two ablation procedures. The success rate with one radiofrequency catheter ablation procedure and or Cryoablation is about 65-70%. Explained to patient that discontinuation of anticoagulation is not an indication for the procedure. Given the complex nature of the disease no guarantee was given on the success of the procedure    Patient verbalized the understanding of the risks, benefit, alternatives, rationale of the procedure  3. Call you insurance company and ask if Tikosyn is covered and see if this will be affordable for you. If it is, well will likely start this prior to ablation. 4. Follow up after procedure. Learning About Catheter Ablation for Heart Rhythm Problems  What is catheter ablation? Catheter ablation is a procedure that treats heart rhythm problems. These problems include atrial fibrillation, supraventricular tachycardia (SVT), atrial flutter, and ventricular tachycardia.   Your

## 2020-01-14 NOTE — LETTER
[facility]   Electrophysiology Note      Reason for office visit: New Patient; Atrial Fibrillation; Shortness of Breath; and Fatigue      HISTORY OF PRESENT ILLNESS: Tam Tai is a 68 y.o. male who presents for follow up of atrial fibrillation. He was initially diagnosed with afib incidentally in June 2018. He underwent a DCCV 8/16/18. He remained in sinus rhythm until his follow up visit 11/2018. He remained in afib, and underwent a stress test 4/2019 that was negative. He was then started on Rythmol on 5/21/19. At his follow up visit 6/2019, he was noted to be in afib with a heart rate of 119. He was then started on Toprol 25 mg QD for rate control and underwent a DCCV on 6/25/19. At his follow up visit on 10/29/19, his Rythmol was increased to 300 mg Q8H. He was then admitted and underwent a repeat cardioversion on 11/20/19. He was started on Sotalol 11/2019. His EKG on 12/26/19 showed afib rate 72. He remained symptomatic with his afib feeling short of breath and fatigue. His EKG from today shows atrial fibrillation rate 84. Today he reports that he has not been feeling as well as he would hope. When he was discharged, he felt that he was gaining energy and was able to be more active. This went on for a few weeks. He now feels that he has become more short of breath and is lacking energy. His activity is limited due to his breathing. He attributes his symptoms to his atrial fibrillation. He has struggled with losing weight for years. He has been diagnosed with SAM though is intolerant to his prescribed CPAP. He has been taking his medications as prescribed and tolerates them well. Patient currently denies any weight gain, edema, palpitations, chest pain, dizziness, and syncope.      Past Medical History:   has a past medical history of Arthritis, Atrial fibrillation ([address] Hiatal hernia, Hypertrophy of prostate without urinary obstruction and other lower urinary tract symptoms (LUTS), Kidney stone, Neoplasm of uncertain behavior of skin, Obesity, unspecified, and Organic sleep apnea, unspecified. Past Surgical History:   has a past surgical history that includes hernia repair; Tonsillectomy; Colonoscopy (04/01/2010); Lithotripsy (Right, 09/2016); other surgical history (Right, 09/25/2016); and other surgical history (10/12/2016). Social History:   reports that he quit smoking about 32 years ago. He has a 45.00 pack-year smoking history. He has never used smokeless tobacco. He reports that he does not drink alcohol or use drugs. Family History: family history includes Cancer in his maternal grandmother, maternal uncle, and mother; Emphysema in his father; Heart Failure in his maternal grandfather. Allergies:  Bee venom and Adhesive tape    Home Medications:    Prior to Admission medications    Medication Sig Start Date End Date Taking? Authorizing Provider   mupirocin (BACTROBAN) 2 % ointment Apply 3 times daily. 1/9/20 1/16/20 Yes Andreea Ko MD   sulfamethoxazole-trimethoprim (BACTRIM) 400-80 MG per tablet Take 1 tablet by mouth 2 times daily for 10 days 1/9/20 1/19/20 Yes Andreea Ko MD   sotalol (BETAPACE) 120 MG tablet Take 1 tablet by mouth 2 times daily 11/20/19  Yes CHARISSE Novoa - CNP   rivaroxaban (XARELTO) 20 MG TABS tablet Take 1 tablet by mouth daily (with breakfast) 7/23/18  Yes Landon Palafox MD       REVIEW OF SYSTEMS:      All 14-point review of systems are completed and pertinent positives are mentioned in the history of present illness. Other systems are reviewed and are negative.      Physical Examination:    /80   Pulse 90   Ht 5' 11\" (1.803 m)   Wt (!) 324 lb 6.4 oz (147.1 kg)   SpO2 98%   BMI 45.24 kg/m²     Constitutional and General Appearance: alert, cooperative, no distress and appears stated age HEENT: PERRL, no cervical lymphadenopathy. No masses palpable. Normal oral mucosa  Respiratory:  · Normal excursion and expansion without use of accessory muscles  · Resp Auscultation: Normal breath sounds without dullness or wheezing  Cardiovascular:  · The apical impulse is not displaced  · Heart tones are crisp and normal. Irregular rate and rhythm  Abdomen:  · No masses or tenderness  · Bowel sounds present  Extremities:  ·  No Cyanosis or Clubbing  ·  Lower extremity edema: No  · Skin: Warm and dry  Neurological:  · Alert and oriented. · Moves all extremities well  · No abnormalities of mood, affect, memory, mentation, or behavior are noted    DATA:    EC2020 Atrial fibrillation 84  ECHO: 18  Summary   Low normal left ventricular systolic function with an estimated ejection   fraction of 50-55%. Normal left ventricular diastolic filling pressure. Right ventricular systolic function is mildly reduced to low normal.   The right ventricle is mildly enlarged. The right atrium is mildly dilated. Mild mitral regurgitation. Mild aortic regurgitation. Mild tricuspid regurgitation. Normal systolic pulmonary artery pressure (SPAP) estimated at 36 mmHg (RA   pressure 8 mmHg). Mild pulmonic regurgitation. IMPRESSION:    1. Persistent atrial fibrillation   Mr. Kelvin Jarvis is a pleasant 68year old male with a medical history significant for persistent symptomatic atrial fibrillation on sotalol, morbid obesity, and obstructive sleep apnea who presents with symptomatic atrial fibrillation with shortness of breath and dyspnea on exertion. He is compliant with his anticoagulation and sotalol however continues to suffer from symptomatic atrial fibrillation.   He has undergone multiple cardioversions with improved symptoms on 2018 and 2019We discussed atrial fibrillation ablation, other antiarrhythmic therapy (dofetilide), 3. Call you insurance company and ask if Tikosyn is covered and see if this will be affordable for you. If it is, well will likely start this prior to ablation. 4. Follow up after procedure. QUALITY MEASURES  1. Tobacco Cessation Counseling: NA  2. Retake of BP if >140/90:   NA  3. Documentation to PCP/referring for new patient:  Sent to PCP at close of office visit  4. CAD patient on anti-platelet: NA  5. CAD patient on STATIN therapy:  NA  6. Patient with aFib on anticoagulation:  Yes, Xarelto      All questions and concerns were addressed to the patient/family. Alternatives to my treatment were discussed. Dr. Alee Lua MD  Electrophysiology  [facility]. [facility]. [address]. [place], [address]  Phone: [phone]  Fax: [phone]   1/14/2020     This note was scribed in the presence of Dr. Cristino Waldrop MD by Shin Bryan RN. NOTE: This report was transcribed using voice recognition software. Every effort was made to ensure accuracy, however, inadvertent computerized transcription errors may be present. The above documentation has been prepared under my direction and personally reviewed by me in its entirety. I confirm the note above accurately reflects the work, physical examination, discussion of treatments and procedures, and medical decision making performed by the nurse and myself.      Electronically signed by Chelsea Gómez MD on 1/15/2020 at 9:23 AM

## 2020-01-16 ENCOUNTER — TELEPHONE (OUTPATIENT)
Dept: CARDIOLOGY CLINIC | Age: 70
End: 2020-01-16

## 2020-01-16 NOTE — TELEPHONE ENCOUNTER
Called [phone] beds. Spoke with Anna Hernandez for [facility] admission for Monday 1/20/20. Pt aware. Dr. Srinivas Vasquez wants his Betapace held starting tonight. Pt is aware to hold it starting tonight. Pt is aware someone will call him Monday morning to tell him when to head to hospital.  He is also, aware that his ablation is on Wednesday.

## 2020-01-20 ENCOUNTER — HOSPITAL ENCOUNTER (INPATIENT)
Age: 70
LOS: 3 days | Discharge: HOME | DRG: 274 | End: 2020-01-23
Attending: INTERNAL MEDICINE | Admitting: INTERNAL MEDICINE
Payer: COMMERCIAL

## 2020-01-20 PROBLEM — I48.91 ATRIAL FIBRILLATION (HCC): Status: ACTIVE | Noted: 2020-01-20

## 2020-01-20 LAB
ANION GAP SERPL CALCULATED.3IONS-SCNC: 9 MMOL/L (ref 3–16)
BUN BLDV-MCNC: 16 MG/DL (ref 7–20)
CALCIUM SERPL-MCNC: 8.9 MG/DL (ref 8.3–10.6)
CHLORIDE BLD-SCNC: 103 MMOL/L (ref 99–110)
CO2: 25 MMOL/L (ref 21–32)
CREAT SERPL-MCNC: 0.9 MG/DL (ref 0.8–1.3)
EKG ATRIAL RATE: 117 BPM
EKG ATRIAL RATE: 277 BPM
EKG DIAGNOSIS: NORMAL
EKG DIAGNOSIS: NORMAL
EKG Q-T INTERVAL: 348 MS
EKG Q-T INTERVAL: 376 MS
EKG QRS DURATION: 84 MS
EKG QRS DURATION: 84 MS
EKG QTC CALCULATION (BAZETT): 459 MS
EKG QTC CALCULATION (BAZETT): 475 MS
EKG R AXIS: 13 DEGREES
EKG R AXIS: 13 DEGREES
EKG T AXIS: 14 DEGREES
EKG T AXIS: 4 DEGREES
EKG VENTRICULAR RATE: 105 BPM
EKG VENTRICULAR RATE: 96 BPM
GFR AFRICAN AMERICAN: >60
GFR NON-AFRICAN AMERICAN: >60
GLUCOSE BLD-MCNC: 98 MG/DL (ref 70–99)
MAGNESIUM: 2 MG/DL (ref 1.8–2.4)
POTASSIUM SERPL-SCNC: 4.5 MMOL/L (ref 3.5–5.1)
SODIUM BLD-SCNC: 137 MMOL/L (ref 136–145)

## 2020-01-20 PROCEDURE — 99223 1ST HOSP IP/OBS HIGH 75: CPT | Performed by: INTERNAL MEDICINE

## 2020-01-20 PROCEDURE — 6370000000 HC RX 637 (ALT 250 FOR IP): Performed by: NURSE PRACTITIONER

## 2020-01-20 PROCEDURE — 93005 ELECTROCARDIOGRAM TRACING: CPT | Performed by: INTERNAL MEDICINE

## 2020-01-20 PROCEDURE — 6370000000 HC RX 637 (ALT 250 FOR IP): Performed by: INTERNAL MEDICINE

## 2020-01-20 PROCEDURE — 80048 BASIC METABOLIC PNL TOTAL CA: CPT

## 2020-01-20 PROCEDURE — 2060000000 HC ICU INTERMEDIATE R&B

## 2020-01-20 PROCEDURE — 2580000003 HC RX 258: Performed by: NURSE PRACTITIONER

## 2020-01-20 PROCEDURE — 93005 ELECTROCARDIOGRAM TRACING: CPT | Performed by: NURSE PRACTITIONER

## 2020-01-20 PROCEDURE — 83735 ASSAY OF MAGNESIUM: CPT

## 2020-01-20 PROCEDURE — 36415 COLL VENOUS BLD VENIPUNCTURE: CPT

## 2020-01-20 RX ORDER — CLINDAMYCIN HYDROCHLORIDE 150 MG/1
300 CAPSULE ORAL 2 TIMES DAILY
Status: DISCONTINUED | OUTPATIENT
Start: 2020-01-20 | End: 2020-01-23 | Stop reason: HOSPADM

## 2020-01-20 RX ORDER — SODIUM CHLORIDE 0.9 % (FLUSH) 0.9 %
10 SYRINGE (ML) INJECTION PRN
Status: DISCONTINUED | OUTPATIENT
Start: 2020-01-20 | End: 2020-01-23 | Stop reason: HOSPADM

## 2020-01-20 RX ORDER — SODIUM CHLORIDE 0.9 % (FLUSH) 0.9 %
10 SYRINGE (ML) INJECTION EVERY 12 HOURS SCHEDULED
Status: DISCONTINUED | OUTPATIENT
Start: 2020-01-20 | End: 2020-01-23 | Stop reason: HOSPADM

## 2020-01-20 RX ORDER — ACETAMINOPHEN 325 MG/1
650 TABLET ORAL EVERY 4 HOURS PRN
Status: DISCONTINUED | OUTPATIENT
Start: 2020-01-20 | End: 2020-01-23 | Stop reason: HOSPADM

## 2020-01-20 RX ORDER — DOFETILIDE 0.25 MG/1
500 CAPSULE ORAL EVERY 12 HOURS SCHEDULED
Status: DISCONTINUED | OUTPATIENT
Start: 2020-01-20 | End: 2020-01-20

## 2020-01-20 RX ORDER — GUAIFENESIN 600 MG/1
600 TABLET, EXTENDED RELEASE ORAL 2 TIMES DAILY
Status: DISCONTINUED | OUTPATIENT
Start: 2020-01-20 | End: 2020-01-23 | Stop reason: HOSPADM

## 2020-01-20 RX ORDER — DOFETILIDE 0.25 MG/1
500 CAPSULE ORAL EVERY 12 HOURS SCHEDULED
Status: DISCONTINUED | OUTPATIENT
Start: 2020-01-20 | End: 2020-01-22

## 2020-01-20 RX ORDER — METOPROLOL TARTRATE 5 MG/5ML
5 INJECTION INTRAVENOUS ONCE
Status: COMPLETED | OUTPATIENT
Start: 2020-01-21 | End: 2020-01-21

## 2020-01-20 RX ORDER — METOPROLOL SUCCINATE 25 MG/1
25 TABLET, EXTENDED RELEASE ORAL 2 TIMES DAILY
Status: DISCONTINUED | OUTPATIENT
Start: 2020-01-20 | End: 2020-01-21

## 2020-01-20 RX ADMIN — RIVAROXABAN 20 MG: 20 TABLET, FILM COATED ORAL at 22:24

## 2020-01-20 RX ADMIN — DOFETILIDE 500 MCG: 0.25 CAPSULE ORAL at 12:48

## 2020-01-20 RX ADMIN — CLINDAMYCIN HYDROCHLORIDE 300 MG: 150 CAPSULE ORAL at 12:48

## 2020-01-20 RX ADMIN — DOFETILIDE 500 MCG: 0.25 CAPSULE ORAL at 21:01

## 2020-01-20 RX ADMIN — GUAIFENESIN 600 MG: 600 TABLET, EXTENDED RELEASE ORAL at 12:48

## 2020-01-20 RX ADMIN — GUAIFENESIN 600 MG: 600 TABLET, EXTENDED RELEASE ORAL at 21:01

## 2020-01-20 RX ADMIN — Medication 10 ML: at 21:01

## 2020-01-20 RX ADMIN — METOPROLOL SUCCINATE 25 MG: 25 TABLET, EXTENDED RELEASE ORAL at 21:01

## 2020-01-20 RX ADMIN — CLINDAMYCIN HYDROCHLORIDE 300 MG: 150 CAPSULE ORAL at 21:01

## 2020-01-20 RX ADMIN — METOPROLOL SUCCINATE 25 MG: 25 TABLET, EXTENDED RELEASE ORAL at 12:47

## 2020-01-20 NOTE — PROGRESS NOTES
4 Eyes Skin Assessment     The patient is being assess for   Admission    I agree that 2 RN's have performed a thorough Head to Toe Skin Assessment on the patient. ALL assessment sites listed below have been assessed. Areas assessed by both nurses:   [x]   Head, Face, and Ears   [x]   Shoulders, Back, and Chest, Abdomen  [x]   Arms, Elbows, and Hands   [x]   Coccyx, Sacrum, and Ischium  [x]   Legs, Feet, and Heels            **SHARE this note so that the co-signing nurse is able to place an eSignature**    Co-signer eSignature: Electronically signed by Eileen Diaz RN on 1/21/20 at 1:53 AM    Does the Patient have Skin Breakdown?   No          Artemio Prevention initiated:  Yes   Wound Care Orders initiated:  No      [facility] nurse consulted for Pressure Injury (Stage 3,4, Unstageable, DTI, NWPT, Complex wounds)and New or Established Ostomies:  No      Primary Nurse eSignature: Anand Myers

## 2020-01-20 NOTE — H&P
and his breathing has improved. He denies fevers, chills, nausea, vomiting, diarrhea, chest pain, orthopnea, PND, etc.     Past Medical History:   has a past medical history of Arthritis, Atrial fibrillation ([address] Hiatal hernia, Hypertrophy of prostate without urinary obstruction and other lower urinary tract symptoms (LUTS), Kidney stone, Neoplasm of uncertain behavior of skin, Obesity, unspecified, and Organic sleep apnea, unspecified.     Past Surgical History:   has a past surgical history that includes hernia repair; Tonsillectomy; Colonoscopy (04/01/2010); Lithotripsy (Right, 09/2016); other surgical history (Right, 09/25/2016); and other surgical history (10/12/2016).    Social History:   reports that he quit smoking about 32 years ago. He has a 45.00 pack-year smoking history. He has never used smokeless tobacco. He reports that he does not drink alcohol or use drugs.      Family History: family history includes Cancer in his maternal grandmother, maternal uncle, and mother; Emphysema in his father; Heart Failure in his maternal grandfather.     Allergies:  Bee venom and Adhesive tape     Home Medications:    Home Medications           Prior to Admission medications    Medication Sig Start Date End Date Taking? Authorizing Provider   mupirocin (BACTROBAN) 2 % ointment Apply 3 times daily. 1/9/20 1/16/20 Yes Aston Mcdaniel MD   sulfamethoxazole-trimethoprim (BACTRIM) 400-80 MG per tablet Take 1 tablet by mouth 2 times daily for 10 days 1/9/20 1/19/20 Yes Aston Mcdaniel MD   sotalol (BETAPACE) 120 MG tablet Take 1 tablet by mouth 2 times daily 11/20/19   Yes CHARISSE Childress - CNP   rivaroxaban (XARELTO) 20 MG TABS tablet Take 1 tablet by mouth daily (with breakfast) 7/23/18   Yes Keri Monzon MD            REVIEW OF SYSTEMS:       All 14-point review of systems are completed and pertinent positives are mentioned in the history of present illness.  Other systems are reviewed and are negative.      Physical Examination:    /80   Pulse 90   Ht 5' 11\" (1.803 m)   Wt (!) 324 lb 6.4 oz (147.1 kg)   SpO2 98%   BMI 45.24 kg/m²    Constitutional and General Appearance: alert, cooperative, no distress and appears stated age  HEENT: PERRL, no cervical lymphadenopathy. No masses palpable. Normal oral mucosa. Sounds nasal  Respiratory:  · Normal excursion and expansion without use of accessory muscles  · Resp Auscultation: Normal breath sounds without dullness or wheezing  Cardiovascular:  · The apical impulse is not displaced  · Heart tones are crisp and normal. Irregular rate and rhythm  Abdomen:  · No masses or tenderness  · Bowel sounds present  Extremities:  ·  No Cyanosis or Clubbing  ·  Lower extremity edema: No  · Skin: Right gluteal lesion that is approximately 2 inches in diameter with erythema without abscess. Neurological:  · Alert and oriented. · Moves all extremities well  · No abnormalities of mood, affect, memory, mentation, or behavior are noted     DATA:    EC2020 Atrial fibrillation 84  ECHO: 18  Summary   Low normal left ventricular systolic function with an estimated ejection   fraction of 50-55%.   Normal left ventricular diastolic filling pressure.   Right ventricular systolic function is mildly reduced to low normal.   The right ventricle is mildly enlarged.   The right atrium is mildly dilated.   Mild mitral regurgitation.   Mild aortic regurgitation.   Mild tricuspid regurgitation.   Normal systolic pulmonary artery pressure (SPAP) estimated at 36 mmHg (RA   pressure 8 mmHg).  Mild pulmonic regurgitation.     IMPRESSION:    1. Persistent atrial fibrillation   Mr. Valerie Melgar is a pleasant 68year old male with a medical history significant for persistent symptomatic atrial fibrillation on sotalol, morbid obesity, and obstructive sleep apnea who presents with symptomatic atrial fibrillation with shortness of breath and dyspnea on exertion.   He is compliant with his anticoagulation and sotalol however continues to suffer from symptomatic atrial fibrillation. He has undergone multiple cardioversions with improved symptoms on 08/2018 and 06/2019We discussed atrial fibrillation ablation, other antiarrhythmic therapy (dofetilide), and pacemaker. He has a brother that underwent afib ablation and is on dofetilide. We also discussed gastric bypass. He would like to proceed with switch to dofetilide and an atrial fibrillation ablation. 01/20/2020  Patient admitted for dofetilide and ablation. He has been compliant with his anticoagulation and hasn't missed any doses of his rivaroxaban. - Start dofetilide.  - Continue rivaroxaban. - I will start patient on metoprolol.  - Goal K > 4 and Mg > 2.  - EKG 2 hours post each dose. Second dose will be given at 2100.    2. Abscess. Patient with gluteal abscess/cellulitis. Lesion still present. - I will start him clindamycin. 3. URI. - I will start him on mucinex for congestion and ask him to hold phenylephrine.      All questions and concerns were addressed to the patient/family. Alternatives to my treatment were discussed.     Dr. Brenda Infante MD  Electrophysiology  [facility]. [address]. [address].   [place], [address]  Phone: [phone]  Fax: [phone]

## 2020-01-21 LAB
ANION GAP SERPL CALCULATED.3IONS-SCNC: 6 MMOL/L (ref 3–16)
BUN BLDV-MCNC: 21 MG/DL (ref 7–20)
CALCIUM SERPL-MCNC: 8.6 MG/DL (ref 8.3–10.6)
CHLORIDE BLD-SCNC: 103 MMOL/L (ref 99–110)
CO2: 28 MMOL/L (ref 21–32)
CREAT SERPL-MCNC: 1 MG/DL (ref 0.8–1.3)
EKG ATRIAL RATE: 277 BPM
EKG ATRIAL RATE: 300 BPM
EKG DIAGNOSIS: NORMAL
EKG DIAGNOSIS: NORMAL
EKG Q-T INTERVAL: 364 MS
EKG Q-T INTERVAL: 392 MS
EKG QRS DURATION: 82 MS
EKG QRS DURATION: 82 MS
EKG QTC CALCULATION (BAZETT): 459 MS
EKG QTC CALCULATION (BAZETT): 500 MS
EKG R AXIS: 15 DEGREES
EKG R AXIS: 9 DEGREES
EKG T AXIS: 18 DEGREES
EKG T AXIS: 24 DEGREES
EKG VENTRICULAR RATE: 96 BPM
EKG VENTRICULAR RATE: 98 BPM
GFR AFRICAN AMERICAN: >60
GFR NON-AFRICAN AMERICAN: >60
GLUCOSE BLD-MCNC: 109 MG/DL (ref 70–99)
MAGNESIUM: 2.1 MG/DL (ref 1.8–2.4)
POTASSIUM SERPL-SCNC: 4.9 MMOL/L (ref 3.5–5.1)
SODIUM BLD-SCNC: 137 MMOL/L (ref 136–145)

## 2020-01-21 PROCEDURE — 6370000000 HC RX 637 (ALT 250 FOR IP): Performed by: INTERNAL MEDICINE

## 2020-01-21 PROCEDURE — 2580000003 HC RX 258: Performed by: NURSE PRACTITIONER

## 2020-01-21 PROCEDURE — 36415 COLL VENOUS BLD VENIPUNCTURE: CPT

## 2020-01-21 PROCEDURE — 80048 BASIC METABOLIC PNL TOTAL CA: CPT

## 2020-01-21 PROCEDURE — 83735 ASSAY OF MAGNESIUM: CPT

## 2020-01-21 PROCEDURE — 2500000003 HC RX 250 WO HCPCS: Performed by: INTERNAL MEDICINE

## 2020-01-21 PROCEDURE — 2060000000 HC ICU INTERMEDIATE R&B

## 2020-01-21 PROCEDURE — 99233 SBSQ HOSP IP/OBS HIGH 50: CPT | Performed by: INTERNAL MEDICINE

## 2020-01-21 PROCEDURE — 6370000000 HC RX 637 (ALT 250 FOR IP): Performed by: NURSE PRACTITIONER

## 2020-01-21 RX ORDER — METOPROLOL SUCCINATE 50 MG/1
50 TABLET, EXTENDED RELEASE ORAL 2 TIMES DAILY
Status: DISCONTINUED | OUTPATIENT
Start: 2020-01-21 | End: 2020-01-23 | Stop reason: HOSPADM

## 2020-01-21 RX ADMIN — METOPROLOL SUCCINATE 50 MG: 50 TABLET, EXTENDED RELEASE ORAL at 21:53

## 2020-01-21 RX ADMIN — GUAIFENESIN 600 MG: 600 TABLET, EXTENDED RELEASE ORAL at 09:58

## 2020-01-21 RX ADMIN — METOPROLOL TARTRATE 5 MG: 5 INJECTION INTRAVENOUS at 06:36

## 2020-01-21 RX ADMIN — CLINDAMYCIN HYDROCHLORIDE 300 MG: 150 CAPSULE ORAL at 09:58

## 2020-01-21 RX ADMIN — DOFETILIDE 500 MCG: 0.25 CAPSULE ORAL at 09:58

## 2020-01-21 RX ADMIN — ACETAMINOPHEN 650 MG: 325 TABLET ORAL at 21:56

## 2020-01-21 RX ADMIN — CLINDAMYCIN HYDROCHLORIDE 300 MG: 150 CAPSULE ORAL at 21:53

## 2020-01-21 RX ADMIN — Medication 10 ML: at 21:58

## 2020-01-21 RX ADMIN — RIVAROXABAN 20 MG: 20 TABLET, FILM COATED ORAL at 21:52

## 2020-01-21 RX ADMIN — METOPROLOL SUCCINATE 25 MG: 25 TABLET, EXTENDED RELEASE ORAL at 09:58

## 2020-01-21 RX ADMIN — Medication 10 ML: at 10:00

## 2020-01-21 RX ADMIN — GUAIFENESIN 600 MG: 600 TABLET, EXTENDED RELEASE ORAL at 21:51

## 2020-01-21 RX ADMIN — DOFETILIDE 500 MCG: 0.25 CAPSULE ORAL at 21:51

## 2020-01-21 ASSESSMENT — PAIN SCALES - GENERAL
PAINLEVEL_OUTOF10: 4
PAINLEVEL_OUTOF10: 4

## 2020-01-21 NOTE — PROGRESS NOTES
/75   Pulse 102   Temp 98.1 °F (36.7 °C) (Oral)   Resp 18   Ht 5' 11\" (1.803 m)   Wt (!) 315 lb 12.8 oz (143.2 kg)   SpO2 95%   BMI 44.05 kg/m²  on room air. Pt sitting up on couch. Pt denies pain, discomfort or shortness of breath. Lungs clear. Atrial Fibrillation on tele. Pt denies any needs at this time, informed pt EKG would be complete 2 hours after a.m tikosyn given. Pt instructed to call out for needs and assistance.   Will continue to monitor  [id number] [address]  1/21/2020

## 2020-01-21 NOTE — PLAN OF CARE
Problem: Cardiac:  Goal: Ability to maintain vital signs within normal range will improve  Description  Ability to maintain vital signs within normal range will improve  Outcome: Met This Shift  Note:   Vital signs stable this shift. Latest vitals /80   Pulse 95   Temp 98 °F (36.7 °C) (Oral)   Resp 18   Ht 5' 11\" (1.803 m)   Wt (!) 315 lb 12.8 oz (143.2 kg)   SpO2 96%   BMI 44.05 kg/m²  on room air. Problem: Cardiac:  Goal: Cardiovascular alteration will improve  Description  Cardiovascular alteration will improve  Outcome: Ongoing  Intervention: Monitor cardiac arrhythmias  Note:   Pt has been in atrial fibrillation this shift, on tikosyn with ekg's 2 hours after administration of tikosyn.

## 2020-01-21 NOTE — PROGRESS NOTES
RVR  · Constitutional: Alert. Oriented to person, place, and time. No distress. · Neck: Neck supple. No lymphadenopathy. No rigidity. No JVD present. · Cardiovascular: Irregular rate and rhythm without M/G/R  · Pulmonary/Chest: Bilateral respiratory sounds present. No respiratory accessory muscle use. No wheezes, No rhonchi. · Abdominal: Soft. Normal bowel sounds present. No distension, No tenderness. · Musculoskeletal: No tenderness. No edema    · Neurological: Alert and oriented. Cranial nerve II-XII grossly intact. · Psychiatric: No anxiety nor agitation. Labs, diagnostic and imaging results reviewed. Reviewed. Recent Labs     01/20/20  1122 01/21/20  0516    137   K 4.5 4.9    103   CO2 25 28   BUN 16 21*   CREATININE 0.9 1.0     No results for input(s): WBC, HGB, HCT, MCV, PLT in the last 72 hours. Lab Results   Component Value Date    TROPONINI <0.01 11/18/2019     Estimated Creatinine Clearance: 98 mL/min (based on SCr of 1 mg/dL).    No results found for: BNP  Lab Results   Component Value Date    PROTIME 16.5 06/25/2019    PROTIME 14.6 08/16/2018    PROTIME 15.6 07/30/2018    INR 1.45 06/25/2019    INR 1.28 08/16/2018    INR 1.37 07/30/2018     Lab Results   Component Value Date    CHOL 148 05/17/2016    HDL 39 05/17/2016    TRIG 94 05/17/2016       Scheduled Meds:   metoprolol succinate  50 mg Oral BID    rivaroxaban  20 mg Oral Daily with breakfast    sodium chloride flush  10 mL Intravenous 2 times per day    clindamycin  300 mg Oral BID    guaiFENesin  600 mg Oral BID    dofetilide  500 mcg Oral 2 times per day     Continuous Infusions:  PRN Meds:sodium chloride flush, magnesium hydroxide, acetaminophen     Patient Active Problem List    Diagnosis Date Noted    Persistent atrial fibrillation      Priority: High    Atrial fibrillation ([facility] [address] 35/05/3696    Renal colic on right side 47/35/0202    Morbid obesity with BMI of 40.0-44.9, adult ([facility] [address] 10/06/2015    Back pain 06/25/2015    Kidney stone     Obesity     Organic sleep apnea       Active Hospital Problems    Diagnosis Date Noted    Atrial fibrillation [facility]) [I48.91] 01/20/2020     IMPRESSION:    1. Persistent atrial fibrillation   Mr. Donovan Patiño is a pleasant 68year old male with a medical history significant for persistent symptomatic atrial fibrillation on sotalol, morbid obesity, and obstructive sleep apnea who presents with symptomatic atrial fibrillation with shortness of breath and dyspnea on exertion.  He is compliant with his anticoagulation and sotalol however continues to suffer from symptomatic atrial fibrillation. Bryant Lombard has undergone multiple cardioversions with improved symptoms on 08/2018 and 06/2019We discussed atrial fibrillation ablation, other antiarrhythmic therapy (dofetilide), and pacemaker. Bryant Lombard has a brother that underwent afib ablation and is on dofetilide.  We also discussed gastric bypass.  He would like to proceed with switch to dofetilide and an atrial fibrillation ablation.       01/20/2020  Patient admitted for dofetilide and ablation. He has been compliant with his anticoagulation and hasn't missed any doses of his rivaroxaban. 01/21/2020  No acute events overnight. Feeling better. Ready for ablation. Tolerating dofetilide. QTc borderline where I may need to decrease dose but will see how he does when in sinus rhythm. - Continue dofetilide.  - Continue rivaroxaban. - I will increase metoprolol.  - Goal K > 4 and Mg > 2.  - NPO at midnight. - Atrial fibrillation ablation +/- cardioversion in am.     2. Abscess. Patient with gluteal abscess/cellulitis. Lesion still present. - I will continue him clindamycin.     3. URI. - I will continue him on mucinex for congestion and ask him to hold phenylephrine.      Thank you for allowing me to participate in the care of this patient. If you have any questions, please do not hesitate to contact me.     Jem Centeno MD  Cardiac

## 2020-01-21 NOTE — FLOWSHEET NOTE
01/21/20 1039   Encounter Summary   Services provided to: Patient   Referral/Consult From: Nurse   Support System Spouse; Children   Continue Visiting   (1-21, AD info. No spiritual needs.)   Complexity of Encounter Moderate   Length of Encounter 30 minutes   Advance Directives (For Healthcare)   Healthcare Directive Yes, patient has an advance directive for healthcare treatment   Advance Directives Documents given;Documents explained;Pt. not interested at this time    provided forms and education on  Advanced directives. I explained the [place] proxy decision-making order to the patient, which is consistent with his wishes. I also explained the difference between a code status and living will. Patient did not want to complete documents at this time. Offered prn follow-up and contact information. No present spiritual needs. PRN follow-up.

## 2020-01-21 NOTE — PROGRESS NOTES
,/80   Pulse 95   Temp 98 °F (36.7 °C) (Oral)   Resp 18   Ht 5' 11\" (1.803 m)   Wt (!) 315 lb 12.8 oz (143.2 kg)   SpO2 96%   BMI 44.05 kg/m²   On room air. Pt up in chair with wife at bedside. Pt denies pain, discomfort or shortness of breath. Lungs clear. Atrial Fibrillation on tele. Pt denies any needs at this time. Bedside table and call light within reach. Will continue to monitor.   Jinny Dakins  1/21/2020

## 2020-01-22 ENCOUNTER — ANESTHESIA (OUTPATIENT)
Dept: CARDIAC CATH/INVASIVE PROCEDURES | Age: 70
DRG: 274 | End: 2020-01-22
Payer: COMMERCIAL

## 2020-01-22 ENCOUNTER — APPOINTMENT (OUTPATIENT)
Dept: CARDIAC CATH/INVASIVE PROCEDURES | Age: 70
DRG: 274 | End: 2020-01-22
Payer: COMMERCIAL

## 2020-01-22 ENCOUNTER — ANESTHESIA EVENT (OUTPATIENT)
Dept: CARDIAC CATH/INVASIVE PROCEDURES | Age: 70
DRG: 274 | End: 2020-01-22
Payer: COMMERCIAL

## 2020-01-22 VITALS — SYSTOLIC BLOOD PRESSURE: 116 MMHG | TEMPERATURE: 96.8 F | OXYGEN SATURATION: 93 % | DIASTOLIC BLOOD PRESSURE: 74 MMHG

## 2020-01-22 LAB
ANION GAP SERPL CALCULATED.3IONS-SCNC: 11 MMOL/L (ref 3–16)
BUN BLDV-MCNC: 22 MG/DL (ref 7–20)
CALCIUM SERPL-MCNC: 8.6 MG/DL (ref 8.3–10.6)
CHLORIDE BLD-SCNC: 107 MMOL/L (ref 99–110)
CO2: 24 MMOL/L (ref 21–32)
CREAT SERPL-MCNC: 0.7 MG/DL (ref 0.8–1.3)
EKG ATRIAL RATE: 141 BPM
EKG DIAGNOSIS: NORMAL
EKG Q-T INTERVAL: 414 MS
EKG QRS DURATION: 84 MS
EKG QTC CALCULATION (BAZETT): 514 MS
EKG R AXIS: 10 DEGREES
EKG T AXIS: 1 DEGREES
EKG VENTRICULAR RATE: 93 BPM
GFR AFRICAN AMERICAN: >60
GFR NON-AFRICAN AMERICAN: >60
GLUCOSE BLD-MCNC: 103 MG/DL (ref 70–99)
MAGNESIUM: 2.1 MG/DL (ref 1.8–2.4)
POC ACT LR: 164 SEC
POC ACT LR: 243 SEC
POC ACT LR: 252 SEC
POC ACT LR: 269 SEC
POC ACT LR: 275 SEC
POC ACT LR: 344 SEC
POC ACT LR: 387 SEC
POTASSIUM SERPL-SCNC: 4.4 MMOL/L (ref 3.5–5.1)
SODIUM BLD-SCNC: 142 MMOL/L (ref 136–145)

## 2020-01-22 PROCEDURE — 7100000001 HC PACU RECOVERY - ADDTL 15 MIN

## 2020-01-22 PROCEDURE — 93623 PRGRMD STIMJ&PACG IV RX NFS: CPT | Performed by: INTERNAL MEDICINE

## 2020-01-22 PROCEDURE — 93662 INTRACARDIAC ECG (ICE): CPT | Performed by: INTERNAL MEDICINE

## 2020-01-22 PROCEDURE — C1769 GUIDE WIRE: HCPCS

## 2020-01-22 PROCEDURE — 6360000002 HC RX W HCPCS

## 2020-01-22 PROCEDURE — 2500000003 HC RX 250 WO HCPCS: Performed by: NURSE ANESTHETIST, CERTIFIED REGISTERED

## 2020-01-22 PROCEDURE — 83735 ASSAY OF MAGNESIUM: CPT

## 2020-01-22 PROCEDURE — 93613 INTRACARDIAC EPHYS 3D MAPG: CPT

## 2020-01-22 PROCEDURE — 2580000003 HC RX 258: Performed by: NURSE ANESTHETIST, CERTIFIED REGISTERED

## 2020-01-22 PROCEDURE — 2580000003 HC RX 258: Performed by: NURSE PRACTITIONER

## 2020-01-22 PROCEDURE — 7100000000 HC PACU RECOVERY - FIRST 15 MIN

## 2020-01-22 PROCEDURE — 02583ZZ DESTRUCTION OF CONDUCTION MECHANISM, PERCUTANEOUS APPROACH: ICD-10-PCS | Performed by: INTERNAL MEDICINE

## 2020-01-22 PROCEDURE — 6370000000 HC RX 637 (ALT 250 FOR IP): Performed by: INTERNAL MEDICINE

## 2020-01-22 PROCEDURE — 93656 COMPRE EP EVAL ABLTJ ATR FIB: CPT

## 2020-01-22 PROCEDURE — B24BZZ4 ULTRASONOGRAPHY OF HEART WITH AORTA, TRANSESOPHAGEAL: ICD-10-PCS | Performed by: INTERNAL MEDICINE

## 2020-01-22 PROCEDURE — 93662 INTRACARDIAC ECG (ICE): CPT

## 2020-01-22 PROCEDURE — 93010 ELECTROCARDIOGRAM REPORT: CPT | Performed by: INTERNAL MEDICINE

## 2020-01-22 PROCEDURE — 6370000000 HC RX 637 (ALT 250 FOR IP): Performed by: NURSE PRACTITIONER

## 2020-01-22 PROCEDURE — 93005 ELECTROCARDIOGRAM TRACING: CPT | Performed by: INTERNAL MEDICINE

## 2020-01-22 PROCEDURE — C1759 CATH, INTRA ECHOCARDIOGRAPHY: HCPCS

## 2020-01-22 PROCEDURE — 6360000002 HC RX W HCPCS: Performed by: NURSE ANESTHETIST, CERTIFIED REGISTERED

## 2020-01-22 PROCEDURE — C1730 CATH, EP, 19 OR FEW ELECT: HCPCS

## 2020-01-22 PROCEDURE — 93613 INTRACARDIAC EPHYS 3D MAPG: CPT | Performed by: INTERNAL MEDICINE

## 2020-01-22 PROCEDURE — 2500000003 HC RX 250 WO HCPCS

## 2020-01-22 PROCEDURE — 93656 COMPRE EP EVAL ABLTJ ATR FIB: CPT | Performed by: INTERNAL MEDICINE

## 2020-01-22 PROCEDURE — 2709999900 HC NON-CHARGEABLE SUPPLY

## 2020-01-22 PROCEDURE — 2060000000 HC ICU INTERMEDIATE R&B

## 2020-01-22 PROCEDURE — 2580000003 HC RX 258

## 2020-01-22 PROCEDURE — 02K83ZZ MAP CONDUCTION MECHANISM, PERCUTANEOUS APPROACH: ICD-10-PCS | Performed by: INTERNAL MEDICINE

## 2020-01-22 PROCEDURE — 36415 COLL VENOUS BLD VENIPUNCTURE: CPT

## 2020-01-22 PROCEDURE — 80048 BASIC METABOLIC PNL TOTAL CA: CPT

## 2020-01-22 PROCEDURE — 3700000001 HC ADD 15 MINUTES (ANESTHESIA)

## 2020-01-22 PROCEDURE — C1894 INTRO/SHEATH, NON-LASER: HCPCS

## 2020-01-22 PROCEDURE — 3700000000 HC ANESTHESIA ATTENDED CARE

## 2020-01-22 PROCEDURE — C1732 CATH, EP, DIAG/ABL, 3D/VECT: HCPCS

## 2020-01-22 PROCEDURE — 85347 COAGULATION TIME ACTIVATED: CPT

## 2020-01-22 PROCEDURE — 99232 SBSQ HOSP IP/OBS MODERATE 35: CPT | Performed by: INTERNAL MEDICINE

## 2020-01-22 PROCEDURE — 2720000010 HC SURG SUPPLY STERILE

## 2020-01-22 RX ORDER — SODIUM CHLORIDE 0.9 % (FLUSH) 0.9 %
10 SYRINGE (ML) INJECTION PRN
Status: DISCONTINUED | OUTPATIENT
Start: 2020-01-22 | End: 2020-01-23 | Stop reason: HOSPADM

## 2020-01-22 RX ORDER — PROPOFOL 10 MG/ML
INJECTION, EMULSION INTRAVENOUS PRN
Status: DISCONTINUED | OUTPATIENT
Start: 2020-01-22 | End: 2020-01-22 | Stop reason: SDUPTHER

## 2020-01-22 RX ORDER — LIDOCAINE HYDROCHLORIDE 20 MG/ML
INJECTION, SOLUTION INFILTRATION; PERINEURAL PRN
Status: DISCONTINUED | OUTPATIENT
Start: 2020-01-22 | End: 2020-01-22 | Stop reason: SDUPTHER

## 2020-01-22 RX ORDER — ACETAMINOPHEN 325 MG/1
650 TABLET ORAL EVERY 4 HOURS PRN
Status: DISCONTINUED | OUTPATIENT
Start: 2020-01-22 | End: 2020-01-23 | Stop reason: HOSPADM

## 2020-01-22 RX ORDER — PHENYLEPHRINE HCL IN 0.9% NACL 1 MG/10 ML
SYRINGE (ML) INTRAVENOUS PRN
Status: DISCONTINUED | OUTPATIENT
Start: 2020-01-22 | End: 2020-01-22 | Stop reason: SDUPTHER

## 2020-01-22 RX ORDER — DEXAMETHASONE SODIUM PHOSPHATE 4 MG/ML
INJECTION, SOLUTION INTRA-ARTICULAR; INTRALESIONAL; INTRAMUSCULAR; INTRAVENOUS; SOFT TISSUE PRN
Status: DISCONTINUED | OUTPATIENT
Start: 2020-01-22 | End: 2020-01-22 | Stop reason: SDUPTHER

## 2020-01-22 RX ORDER — PROTAMINE SULFATE 10 MG/ML
INJECTION, SOLUTION INTRAVENOUS PRN
Status: DISCONTINUED | OUTPATIENT
Start: 2020-01-22 | End: 2020-01-22 | Stop reason: SDUPTHER

## 2020-01-22 RX ORDER — OXYCODONE HYDROCHLORIDE AND ACETAMINOPHEN 5; 325 MG/1; MG/1
1 TABLET ORAL EVERY 4 HOURS PRN
Status: DISCONTINUED | OUTPATIENT
Start: 2020-01-22 | End: 2020-01-23 | Stop reason: HOSPADM

## 2020-01-22 RX ORDER — HYDROMORPHONE HCL 110MG/55ML
0.5 PATIENT CONTROLLED ANALGESIA SYRINGE INTRAVENOUS EVERY 4 HOURS PRN
Status: DISCONTINUED | OUTPATIENT
Start: 2020-01-22 | End: 2020-01-23 | Stop reason: HOSPADM

## 2020-01-22 RX ORDER — SODIUM CHLORIDE 0.9 % (FLUSH) 0.9 %
10 SYRINGE (ML) INJECTION EVERY 12 HOURS SCHEDULED
Status: DISCONTINUED | OUTPATIENT
Start: 2020-01-22 | End: 2020-01-23 | Stop reason: HOSPADM

## 2020-01-22 RX ORDER — ROCURONIUM BROMIDE 10 MG/ML
INJECTION, SOLUTION INTRAVENOUS PRN
Status: DISCONTINUED | OUTPATIENT
Start: 2020-01-22 | End: 2020-01-22 | Stop reason: SDUPTHER

## 2020-01-22 RX ORDER — FENTANYL CITRATE 50 UG/ML
INJECTION, SOLUTION INTRAMUSCULAR; INTRAVENOUS PRN
Status: DISCONTINUED | OUTPATIENT
Start: 2020-01-22 | End: 2020-01-22 | Stop reason: SDUPTHER

## 2020-01-22 RX ORDER — PANTOPRAZOLE SODIUM 40 MG/1
40 TABLET, DELAYED RELEASE ORAL 2 TIMES DAILY
Status: DISCONTINUED | OUTPATIENT
Start: 2020-01-22 | End: 2020-01-23 | Stop reason: HOSPADM

## 2020-01-22 RX ORDER — ONDANSETRON 2 MG/ML
INJECTION INTRAMUSCULAR; INTRAVENOUS PRN
Status: DISCONTINUED | OUTPATIENT
Start: 2020-01-22 | End: 2020-01-22 | Stop reason: SDUPTHER

## 2020-01-22 RX ORDER — EPHEDRINE SULFATE 50 MG/ML
INJECTION INTRAVENOUS PRN
Status: DISCONTINUED | OUTPATIENT
Start: 2020-01-22 | End: 2020-01-22 | Stop reason: SDUPTHER

## 2020-01-22 RX ORDER — HEPARIN SODIUM 10000 [USP'U]/100ML
INJECTION, SOLUTION INTRAVENOUS CONTINUOUS PRN
Status: DISCONTINUED | OUTPATIENT
Start: 2020-01-22 | End: 2020-01-23 | Stop reason: HOSPADM

## 2020-01-22 RX ORDER — HEPARIN SODIUM 1000 [USP'U]/ML
INJECTION, SOLUTION INTRAVENOUS; SUBCUTANEOUS
Status: COMPLETED | OUTPATIENT
Start: 2020-01-22 | End: 2020-01-22

## 2020-01-22 RX ORDER — DOFETILIDE 0.25 MG/1
250 CAPSULE ORAL EVERY 12 HOURS SCHEDULED
Status: DISCONTINUED | OUTPATIENT
Start: 2020-01-22 | End: 2020-01-23 | Stop reason: HOSPADM

## 2020-01-22 RX ORDER — SODIUM CHLORIDE 9 MG/ML
INJECTION, SOLUTION INTRAVENOUS CONTINUOUS PRN
Status: DISCONTINUED | OUTPATIENT
Start: 2020-01-22 | End: 2020-01-22 | Stop reason: SDUPTHER

## 2020-01-22 RX ORDER — SUCRALFATE 1 G/1
1 TABLET ORAL EVERY 6 HOURS SCHEDULED
Status: DISCONTINUED | OUTPATIENT
Start: 2020-01-22 | End: 2020-01-23 | Stop reason: HOSPADM

## 2020-01-22 RX ADMIN — HEPARIN SODIUM 3000 UNITS: 1000 INJECTION, SOLUTION INTRAVENOUS; SUBCUTANEOUS at 12:05

## 2020-01-22 RX ADMIN — HEPARIN SODIUM 2000 UNITS: 1000 INJECTION, SOLUTION INTRAVENOUS; SUBCUTANEOUS at 11:44

## 2020-01-22 RX ADMIN — METOPROLOL SUCCINATE 50 MG: 50 TABLET, EXTENDED RELEASE ORAL at 08:15

## 2020-01-22 RX ADMIN — Medication 200 MCG: at 13:31

## 2020-01-22 RX ADMIN — PANTOPRAZOLE SODIUM 40 MG: 40 TABLET, DELAYED RELEASE ORAL at 21:21

## 2020-01-22 RX ADMIN — ONDANSETRON 4 MG: 2 INJECTION INTRAMUSCULAR; INTRAVENOUS at 13:31

## 2020-01-22 RX ADMIN — Medication 100 MCG: at 13:11

## 2020-01-22 RX ADMIN — PROPOFOL 200 MG: 10 INJECTION, EMULSION INTRAVENOUS at 10:00

## 2020-01-22 RX ADMIN — Medication 200 MCG: at 10:06

## 2020-01-22 RX ADMIN — HEPARIN SODIUM 5000 UNITS: 1000 INJECTION, SOLUTION INTRAVENOUS; SUBCUTANEOUS at 10:33

## 2020-01-22 RX ADMIN — ONDANSETRON 4 MG: 2 INJECTION INTRAMUSCULAR; INTRAVENOUS at 10:38

## 2020-01-22 RX ADMIN — EPHEDRINE SULFATE 5 MG: 50 INJECTION INTRAVENOUS at 13:16

## 2020-01-22 RX ADMIN — Medication 200 MCG: at 11:55

## 2020-01-22 RX ADMIN — PROPOFOL 50 MG: 10 INJECTION, EMULSION INTRAVENOUS at 11:44

## 2020-01-22 RX ADMIN — Medication 200 MCG: at 12:09

## 2020-01-22 RX ADMIN — Medication 100 MCG: at 12:47

## 2020-01-22 RX ADMIN — Medication 10 ML: at 08:17

## 2020-01-22 RX ADMIN — METOPROLOL SUCCINATE 50 MG: 50 TABLET, EXTENDED RELEASE ORAL at 21:21

## 2020-01-22 RX ADMIN — Medication 100 MCG: at 11:52

## 2020-01-22 RX ADMIN — HYDROMORPHONE HYDROCHLORIDE 0.5 MG: 1 INJECTION, SOLUTION INTRAMUSCULAR; INTRAVENOUS; SUBCUTANEOUS at 15:04

## 2020-01-22 RX ADMIN — DEXAMETHASONE SODIUM PHOSPHATE 10 MG: 4 INJECTION, SOLUTION INTRAMUSCULAR; INTRAVENOUS at 10:38

## 2020-01-22 RX ADMIN — DOFETILIDE 250 MCG: 0.25 CAPSULE ORAL at 21:05

## 2020-01-22 RX ADMIN — LIDOCAINE HYDROCHLORIDE 60 MG: 20 INJECTION, SOLUTION INFILTRATION; PERINEURAL at 09:57

## 2020-01-22 RX ADMIN — FENTANYL CITRATE 100 MCG: 50 INJECTION, SOLUTION INTRAMUSCULAR; INTRAVENOUS at 11:45

## 2020-01-22 RX ADMIN — GUAIFENESIN 600 MG: 600 TABLET, EXTENDED RELEASE ORAL at 21:21

## 2020-01-22 RX ADMIN — Medication 200 MCG: at 13:07

## 2020-01-22 RX ADMIN — Medication 100 MCG: at 13:15

## 2020-01-22 RX ADMIN — OXYCODONE HYDROCHLORIDE AND ACETAMINOPHEN 1 TABLET: 5; 325 TABLET ORAL at 16:36

## 2020-01-22 RX ADMIN — PROTAMINE SULFATE 50 MG: 10 INJECTION, SOLUTION INTRAVENOUS at 13:30

## 2020-01-22 RX ADMIN — CLINDAMYCIN HYDROCHLORIDE 300 MG: 150 CAPSULE ORAL at 21:21

## 2020-01-22 RX ADMIN — Medication 100 MCG: at 12:23

## 2020-01-22 RX ADMIN — Medication 0.5 MG: at 15:04

## 2020-01-22 RX ADMIN — HEPARIN SODIUM 2000 UNITS: 1000 INJECTION, SOLUTION INTRAVENOUS; SUBCUTANEOUS at 11:09

## 2020-01-22 RX ADMIN — DOFETILIDE 500 MCG: 0.25 CAPSULE ORAL at 08:15

## 2020-01-22 RX ADMIN — Medication 100 MCG: at 13:09

## 2020-01-22 RX ADMIN — FENTANYL CITRATE 100 MCG: 50 INJECTION, SOLUTION INTRAMUSCULAR; INTRAVENOUS at 09:57

## 2020-01-22 RX ADMIN — Medication 100 MCG: at 11:27

## 2020-01-22 RX ADMIN — SODIUM CHLORIDE: 9 INJECTION, SOLUTION INTRAVENOUS at 09:45

## 2020-01-22 RX ADMIN — ROCURONIUM BROMIDE 50 MG: 10 SOLUTION INTRAVENOUS at 10:00

## 2020-01-22 RX ADMIN — RIVAROXABAN 20 MG: 20 TABLET, FILM COATED ORAL at 21:21

## 2020-01-22 RX ADMIN — SODIUM CHLORIDE: 9 INJECTION, SOLUTION INTRAVENOUS at 13:28

## 2020-01-22 RX ADMIN — HEPARIN SODIUM 2000 UNITS/HR: 10000 INJECTION, SOLUTION INTRAVENOUS at 10:49

## 2020-01-22 ASSESSMENT — PULMONARY FUNCTION TESTS
PIF_VALUE: 27
PIF_VALUE: 29
PIF_VALUE: 29
PIF_VALUE: 28
PIF_VALUE: 29
PIF_VALUE: 28
PIF_VALUE: 29
PIF_VALUE: 26
PIF_VALUE: 29
PIF_VALUE: 29
PIF_VALUE: 26
PIF_VALUE: 0
PIF_VALUE: 27
PIF_VALUE: 0
PIF_VALUE: 27
PIF_VALUE: 29
PIF_VALUE: 27
PIF_VALUE: 29
PIF_VALUE: 28
PIF_VALUE: 0
PIF_VALUE: 29
PIF_VALUE: 26
PIF_VALUE: 27
PIF_VALUE: 27
PIF_VALUE: 26
PIF_VALUE: 27
PIF_VALUE: 26
PIF_VALUE: 26
PIF_VALUE: 29
PIF_VALUE: 28
PIF_VALUE: 29
PIF_VALUE: 1
PIF_VALUE: 29
PIF_VALUE: 28
PIF_VALUE: 3
PIF_VALUE: 27
PIF_VALUE: 29
PIF_VALUE: 27
PIF_VALUE: 30
PIF_VALUE: 27
PIF_VALUE: 30
PIF_VALUE: 28
PIF_VALUE: 30
PIF_VALUE: 29
PIF_VALUE: 28
PIF_VALUE: 0
PIF_VALUE: 0
PIF_VALUE: 26
PIF_VALUE: 29
PIF_VALUE: 30
PIF_VALUE: 0
PIF_VALUE: 26
PIF_VALUE: 30
PIF_VALUE: 29
PIF_VALUE: 29
PIF_VALUE: 28
PIF_VALUE: 29
PIF_VALUE: 27
PIF_VALUE: 0
PIF_VALUE: 29
PIF_VALUE: 29
PIF_VALUE: 22
PIF_VALUE: 26
PIF_VALUE: 30
PIF_VALUE: 12
PIF_VALUE: 0
PIF_VALUE: 27
PIF_VALUE: 26
PIF_VALUE: 27
PIF_VALUE: 26
PIF_VALUE: 26
PIF_VALUE: 27
PIF_VALUE: 29
PIF_VALUE: 29
PIF_VALUE: 26
PIF_VALUE: 28
PIF_VALUE: 26
PIF_VALUE: 28
PIF_VALUE: 26
PIF_VALUE: 30
PIF_VALUE: 28
PIF_VALUE: 29
PIF_VALUE: 26
PIF_VALUE: 29
PIF_VALUE: 27
PIF_VALUE: 30
PIF_VALUE: 29
PIF_VALUE: 27
PIF_VALUE: 28
PIF_VALUE: 27
PIF_VALUE: 29
PIF_VALUE: 0
PIF_VALUE: 29
PIF_VALUE: 28
PIF_VALUE: 29
PIF_VALUE: 27
PIF_VALUE: 26
PIF_VALUE: 29
PIF_VALUE: 0
PIF_VALUE: 27
PIF_VALUE: 29
PIF_VALUE: 26
PIF_VALUE: 28
PIF_VALUE: 26
PIF_VALUE: 0
PIF_VALUE: 28
PIF_VALUE: 27
PIF_VALUE: 27
PIF_VALUE: 30
PIF_VALUE: 29
PIF_VALUE: 28
PIF_VALUE: 0
PIF_VALUE: 30
PIF_VALUE: 26
PIF_VALUE: 29
PIF_VALUE: 28
PIF_VALUE: 27
PIF_VALUE: 26
PIF_VALUE: 27
PIF_VALUE: 26
PIF_VALUE: 0
PIF_VALUE: 26
PIF_VALUE: 29
PIF_VALUE: 29
PIF_VALUE: 27
PIF_VALUE: 26
PIF_VALUE: 28
PIF_VALUE: 28
PIF_VALUE: 27
PIF_VALUE: 28
PIF_VALUE: 30
PIF_VALUE: 0
PIF_VALUE: 27
PIF_VALUE: 0
PIF_VALUE: 29
PIF_VALUE: 0
PIF_VALUE: 27
PIF_VALUE: 29
PIF_VALUE: 26
PIF_VALUE: 27
PIF_VALUE: 28
PIF_VALUE: 27
PIF_VALUE: 27
PIF_VALUE: 29
PIF_VALUE: 29
PIF_VALUE: 27
PIF_VALUE: 30
PIF_VALUE: 26
PIF_VALUE: 29
PIF_VALUE: 28
PIF_VALUE: 29
PIF_VALUE: 26
PIF_VALUE: 28
PIF_VALUE: 26
PIF_VALUE: 29
PIF_VALUE: 26
PIF_VALUE: 29
PIF_VALUE: 29
PIF_VALUE: 27
PIF_VALUE: 29
PIF_VALUE: 30
PIF_VALUE: 28
PIF_VALUE: 26
PIF_VALUE: 26
PIF_VALUE: 27
PIF_VALUE: 27
PIF_VALUE: 29
PIF_VALUE: 27
PIF_VALUE: 29
PIF_VALUE: 27
PIF_VALUE: 27
PIF_VALUE: 26
PIF_VALUE: 29
PIF_VALUE: 26
PIF_VALUE: 26
PIF_VALUE: 29
PIF_VALUE: 0
PIF_VALUE: 28
PIF_VALUE: 30
PIF_VALUE: 28
PIF_VALUE: 27
PIF_VALUE: 28
PIF_VALUE: 27
PIF_VALUE: 17
PIF_VALUE: 28
PIF_VALUE: 28
PIF_VALUE: 29
PIF_VALUE: 27
PIF_VALUE: 26
PIF_VALUE: 27
PIF_VALUE: 29
PIF_VALUE: 29
PIF_VALUE: 26
PIF_VALUE: 0
PIF_VALUE: 28
PIF_VALUE: 28
PIF_VALUE: 26
PIF_VALUE: 27
PIF_VALUE: 29
PIF_VALUE: 26
PIF_VALUE: 5
PIF_VALUE: 26
PIF_VALUE: 0
PIF_VALUE: 29
PIF_VALUE: 26
PIF_VALUE: 26
PIF_VALUE: 29
PIF_VALUE: 28
PIF_VALUE: 29
PIF_VALUE: 28
PIF_VALUE: 29
PIF_VALUE: 28
PIF_VALUE: 29
PIF_VALUE: 23
PIF_VALUE: 29
PIF_VALUE: 26
PIF_VALUE: 29
PIF_VALUE: 26
PIF_VALUE: 29
PIF_VALUE: 0
PIF_VALUE: 27
PIF_VALUE: 26
PIF_VALUE: 26
PIF_VALUE: 29
PIF_VALUE: 29
PIF_VALUE: 32
PIF_VALUE: 28
PIF_VALUE: 28

## 2020-01-22 ASSESSMENT — PAIN DESCRIPTION - PAIN TYPE: TYPE: CHRONIC PAIN

## 2020-01-22 ASSESSMENT — PAIN DESCRIPTION - DIRECTION: RADIATING_TOWARDS: LOWER

## 2020-01-22 ASSESSMENT — PAIN SCALES - GENERAL
PAINLEVEL_OUTOF10: 6
PAINLEVEL_OUTOF10: 5
PAINLEVEL_OUTOF10: 5

## 2020-01-22 ASSESSMENT — PAIN DESCRIPTION - LOCATION: LOCATION: BACK

## 2020-01-22 NOTE — PROGRESS NOTES
Right and left sided groin sutures removed, sites cleansed with antiseptic, covered with guaze and tegaderm  No bleeding noted, charge nurse at bedside with cath cart during suture removal.  Patient encouraged to stay supine for an additional 30 minutes to monitor sites.

## 2020-01-22 NOTE — PROGRESS NOTES
4 Eyes Skin Assessment     The patient is being assess for   daily    I agree that 2 RN's have performed a thorough Head to Toe Skin Assessment on the patient. ALL assessment sites listed below have been assessed. Areas assessed by both nurses:   [x]   Head, Face, and Ears   [x]   Shoulders, Back, and Chest, Abdomen  [x]   Arms, Elbows, and Hands   [x]   Coccyx, Sacrum, and Ischium  [x]   Legs, Feet, and Heels            **SHARE this note so that the co-signing nurse is able to place an eSignature**    Co-signer eSignature: {[email or web address]:[id number]}    Does the Patient have Skin Breakdown?   Yes [facility] WOUND CARE was Initiated documentation include the Ynes-wound, Wound Assessment, Measurements, Dressing Treatment, Drainage, and Color\",          Artemio Prevention initiated:  Yes   Wound Care Orders initiated:  Yes      [id number]  nurse consulted for Pressure Injury (Stage 3,4, Unstageable, DTI, NWPT, Complex wounds)and New or Established Ostomies:  Yes      Primary Nurse eSignature: Anand Myers

## 2020-01-22 NOTE — ANESTHESIA PRE PROCEDURE
Department of Anesthesiology  Preprocedure Note       Name:  Fabien Lopez   Age:  68 y.o.  :  1950                                          MRN:  [id number]         Date:  2020      Surgeon: * No surgeons listed *    Procedure: ABLATION    Medications prior to admission:   Prior to Admission medications    Medication Sig Start Date End Date Taking? Authorizing Provider   rivaroxaban (XARELTO) 20 MG TABS tablet Take 1 tablet by mouth daily (with breakfast) 18  Yes Ashley Haynes MD   sotalol (BETAPACE) 120 MG tablet Take 1 tablet by mouth 2 times daily 19   Lizandro Betters, APRN - CNP       Current medications:    Current Facility-Administered Medications   Medication Dose Route Frequency Provider Last Rate Last Dose    metoprolol succinate (TOPROL XL) extended release tablet 50 mg  50 mg Oral BID Steve Silva MD   50 mg at 20 0815    rivaroxaban (XARELTO) tablet 20 mg  20 mg Oral Daily with breakfast Lizandro Betters, APRN - CNP   20 mg at 20 215    sodium chloride flush 0.9 % injection 10 mL  10 mL Intravenous 2 times per day Lizandro Betters, APRN - CNP   10 mL at 20 0817    sodium chloride flush 0.9 % injection 10 mL  10 mL Intravenous PRN Lizandro Betters, APRN - CNP        magnesium hydroxide (MILK OF MAGNESIA) 400 MG/5ML suspension 30 mL  30 mL Oral Daily PRN Lizandro Betters, APRN - CNP        acetaminophen (TYLENOL) tablet 650 mg  650 mg Oral Q4H PRN Lizandro Betters, APRN - CNP   650 mg at 20    clindamycin (CLEOCIN) capsule 300 mg  300 mg Oral BID Steve Silva MD   300 mg at 20 2153    guaiFENesin [facility]) extended release tablet 600 mg  600 mg Oral BID Steve Silva MD   600 mg at 20    dofetilide (TIKOSYN) capsule 500 mcg  500 mcg Oral 2 times per day Steve Silva MD   500 mcg at 20 0815       Allergies:     Allergies   Allergen Reactions    Bee Venom     Adhesive Tape Rash Problem List:    Patient Active Problem List   Diagnosis Code    Kidney stone N20.0    Obesity E66.9    Organic sleep apnea G47.30    Back pain M54.9    Morbid obesity with BMI of 40.0-44.9, adult (HCC) E66.01, K56.24    Renal colic on right side P88    Persistent atrial fibrillation I48.19    Atrial fibrillation (HCC) I48.91       Past Medical History:        Diagnosis Date    Arthritis     Atrial fibrillation (HCC)     Hiatal hernia     Hypertrophy of prostate without urinary obstruction and other lower urinary tract symptoms (LUTS)     Kidney stone         Neoplasm of uncertain behavior of skin     Obesity, unspecified     Organic sleep apnea, unspecified        Past Surgical History:        Procedure Laterality Date    COLONOSCOPY  2010    HERNIA REPAIR      umbilical 2153    LITHOTRIPSY Right 2016    OTHER SURGICAL HISTORY Right 2016    CYSTOSCOPY, RIGHT URETEROSCOPY, HOLMIUM LASER LITHOTRIPSY, STONE MANIPULATION AND EXTRACTION    OTHER SURGICAL HISTORY  10/12/2016    cystoscopy right stent removal    TONSILLECTOMY         Social History:    Social History     Tobacco Use    Smoking status: Former Smoker     Packs/day: 1.50     Years: 30.00     Pack years: 45.00     Last attempt to quit: 1988     Years since quittin.0    Smokeless tobacco: Never Used   Substance Use Topics    Alcohol use: No     Alcohol/week: 0.0 standard drinks                                Counseling given: Not Answered      Vital Signs (Current):   Vitals:    20 2151 20 0552 20 0553 20 0808   BP: (!) 142/83  115/82 127/69   Pulse: 102 95 93 103   Resp: 18  20 18   Temp: 98.2 °F (36.8 °C) 97.6 °F (36.4 °C)  97.9 °F (36.6 °C)   TempSrc: Oral Oral  Oral   SpO2: 98% 98% 97% 94%   Weight:       Height:                                                  BP Readings from Last 3 Encounters:   20 127/69   20 122/80   20 128/74       NPO Status: Endo/Other: Negative Endo/Other ROS                    Abdominal:   (+) obese,         Vascular: negative vascular ROS. Anesthesia Plan      MAC     ASA 3       Induction: intravenous. Anesthetic plan and risks discussed with patient. Plan discussed with CRNA.                   Kera Segal MD   1/22/2020

## 2020-01-22 NOTE — PROGRESS NOTES
Electrophysiolgy Progress Note     Admit Date: 2020     Reason for follow up: Atrial fibrillation    Interval History:   - Patient seen and examined. - Clinical notes reviewed. - Telemetry reviewed. In atrial flutter and atrial fibrillation overnight. QTc prolonging.    - No new complaints today. - No major events overnight. Ready for procedure this am.    Physical Examination:  Vitals:    20 0808   BP: 127/69   Pulse: 103   Resp: 18   Temp: 97.9 °F (36.6 °C)   SpO2: 94%        Intake/Output Summary (Last 24 hours) at 2020 0925  Last data filed at 2020 0745  Gross per 24 hour   Intake 812 ml   Output 1350 ml   Net -538 ml     In: 322 [P.O.:322]  Out: 1050    Wt Readings from Last 3 Encounters:   20 (!) 315 lb 12.8 oz (143.2 kg)   20 (!) 324 lb 6.4 oz (147.1 kg)   20 (!) 324 lb (147 kg)     Temp  Av.9 °F (36.6 °C)  Min: 97.6 °F (36.4 °C)  Max: 98.2 °F (36.8 °C)  Pulse  Av.3  Min: 93  Max: 103  BP  Min: 115/82  Max: 142/83  SpO2  Av.3 %  Min: 94 %  Max: 98 %    · Telemetry: Atrial fibrillation with RVR  · Constitutional: Alert. Oriented to person, place, and time. No distress. · Neck: Neck supple. No lymphadenopathy. No rigidity. No JVD present. · Cardiovascular: Irregular rate and rhythm without M/G/R  · Pulmonary/Chest: Bilateral respiratory sounds present. No respiratory accessory muscle use. No wheezes, No rhonchi. · Abdominal: Soft. Normal bowel sounds present. No distension, No tenderness. · Musculoskeletal: No tenderness. No edema    · Neurological: Alert and oriented. Cranial nerve II-XII grossly intact. · Psychiatric: No anxiety nor agitation. Labs, diagnostic and imaging results reviewed. Reviewed.    Recent Labs     20  1122 20  0516 20  0515    137 142   K 4.5 4.9 4.4    103 107   CO2 25 28 24   BUN 16 21* 22*   CREATININE 0.9 1.0 0.7*     No results for input(s): WBC, HGB, HCT, MCV, PLT in the last 72 hours. Lab Results   Component Value Date    TROPONINI <0.01 11/18/2019     Estimated Creatinine Clearance: 140 mL/min (A) (based on SCr of 0.7 mg/dL (L)). No results found for: BNP  Lab Results   Component Value Date    PROTIME 16.5 06/25/2019    PROTIME 14.6 08/16/2018    PROTIME 15.6 07/30/2018    INR 1.45 06/25/2019    INR 1.28 08/16/2018    INR 1.37 07/30/2018     Lab Results   Component Value Date    CHOL 148 05/17/2016    HDL 39 05/17/2016    TRIG 94 05/17/2016       Scheduled Meds:   dofetilide  250 mcg Oral 2 times per day    metoprolol succinate  50 mg Oral BID    rivaroxaban  20 mg Oral Daily with breakfast    sodium chloride flush  10 mL Intravenous 2 times per day    clindamycin  300 mg Oral BID    guaiFENesin  600 mg Oral BID     Continuous Infusions:  PRN Meds:sodium chloride flush, magnesium hydroxide, acetaminophen     Patient Active Problem List    Diagnosis Date Noted    Persistent atrial fibrillation      Priority: High    Atrial fibrillation ([facility] [address] 15/34/5549    Renal colic on right side 63/55/6481    Morbid obesity with BMI of 40.0-44.9, adult ([facility] [address] 10/06/2015    Back pain 06/25/2015    Kidney stone     Obesity     Organic sleep apnea       Active Hospital Problems    Diagnosis Date Noted    Atrial fibrillation [facility]) [I48.91] 01/20/2020     IMPRESSION:    1. Persistent atrial fibrillation   Mr. Denisse Leo is a pleasant 68year old male with a medical history significant for persistent symptomatic atrial fibrillation on sotalol, morbid obesity, and obstructive sleep apnea who presents with symptomatic atrial fibrillation with shortness of breath and dyspnea on exertion.  He is compliant with his anticoagulation and sotalol however continues to suffer from symptomatic atrial fibrillation. [facility] has undergone multiple cardioversions with improved symptoms on 08/2018 and 06/2019We discussed atrial fibrillation ablation, other antiarrhythmic therapy (dofetilide), and pacemaker.

## 2020-01-22 NOTE — ANESTHESIA POSTPROCEDURE EVALUATION
Department of Anesthesiology  Postprocedure Note    Patient: Linette Valera  MRN: [id number]  YOB: 1950  Date of evaluation: 1/22/2020  Time:  2:34 PM     Procedure Summary     Date:  01/22/20 Room / Location:  [facility] Cardiac Cath Lab    Anesthesia Start:  0945 Anesthesia Stop:  2147    Procedure:  ABLATION Diagnosis:       Other persistent atrial fibrillation      Other persistent atrial fibrillation    Scheduled Providers:   Responsible Provider:  Jese Hardy MD    Anesthesia Type:  MAC ASA Status:  3          Anesthesia Type: MAC    Samreen Phase I: Samreen Score: 7    Samreen Phase II:      Last vitals: Reviewed and per EMR flowsheets.        Anesthesia Post Evaluation    Patient location during evaluation: PACU  Patient participation: complete - patient participated  Level of consciousness: awake and alert  Pain score: 0  Airway patency: patent  Nausea & Vomiting: no nausea and no vomiting  Complications: no  Cardiovascular status: blood pressure returned to baseline  Respiratory status: acceptable  Hydration status: stable

## 2020-01-22 NOTE — PROGRESS NOTES
Patient back to room 432 from PACU,.  Tele monitor # 89 in place,. cmu aware., patient in sinus rhythm per [facility]

## 2020-01-23 VITALS
BODY MASS INDEX: 44.1 KG/M2 | OXYGEN SATURATION: 99 % | HEIGHT: 71 IN | WEIGHT: 315 LBS | TEMPERATURE: 97.7 F | HEART RATE: 92 BPM | RESPIRATION RATE: 18 BRPM | SYSTOLIC BLOOD PRESSURE: 139 MMHG | DIASTOLIC BLOOD PRESSURE: 79 MMHG

## 2020-01-23 PROBLEM — I48.91 A-FIB (HCC): Status: ACTIVE | Noted: 2020-01-23

## 2020-01-23 LAB
ANION GAP SERPL CALCULATED.3IONS-SCNC: 9 MMOL/L (ref 3–16)
BUN BLDV-MCNC: 22 MG/DL (ref 7–20)
CALCIUM SERPL-MCNC: 8.1 MG/DL (ref 8.3–10.6)
CHLORIDE BLD-SCNC: 106 MMOL/L (ref 99–110)
CO2: 24 MMOL/L (ref 21–32)
CREAT SERPL-MCNC: 0.8 MG/DL (ref 0.8–1.3)
EKG ATRIAL RATE: 71 BPM
EKG ATRIAL RATE: 76 BPM
EKG DIAGNOSIS: NORMAL
EKG DIAGNOSIS: NORMAL
EKG P AXIS: 11 DEGREES
EKG P AXIS: 69 DEGREES
EKG P-R INTERVAL: 176 MS
EKG P-R INTERVAL: 204 MS
EKG Q-T INTERVAL: 396 MS
EKG Q-T INTERVAL: 422 MS
EKG QRS DURATION: 80 MS
EKG QRS DURATION: 84 MS
EKG QTC CALCULATION (BAZETT): 445 MS
EKG QTC CALCULATION (BAZETT): 458 MS
EKG R AXIS: 13 DEGREES
EKG R AXIS: 17 DEGREES
EKG T AXIS: 18 DEGREES
EKG T AXIS: 29 DEGREES
EKG VENTRICULAR RATE: 71 BPM
EKG VENTRICULAR RATE: 76 BPM
GFR AFRICAN AMERICAN: >60
GFR NON-AFRICAN AMERICAN: >60
GLUCOSE BLD-MCNC: 144 MG/DL (ref 70–99)
MAGNESIUM: 2.1 MG/DL (ref 1.8–2.4)
POC ACT LR: >400 SEC
POC ACT LR: >400 SEC
POTASSIUM SERPL-SCNC: 4.8 MMOL/L (ref 3.5–5.1)
SODIUM BLD-SCNC: 139 MMOL/L (ref 136–145)

## 2020-01-23 PROCEDURE — 80048 BASIC METABOLIC PNL TOTAL CA: CPT

## 2020-01-23 PROCEDURE — 36415 COLL VENOUS BLD VENIPUNCTURE: CPT

## 2020-01-23 PROCEDURE — 93010 ELECTROCARDIOGRAM REPORT: CPT | Performed by: INTERNAL MEDICINE

## 2020-01-23 PROCEDURE — 83735 ASSAY OF MAGNESIUM: CPT

## 2020-01-23 PROCEDURE — 2700000000 HC OXYGEN THERAPY PER DAY

## 2020-01-23 PROCEDURE — 93005 ELECTROCARDIOGRAM TRACING: CPT | Performed by: INTERNAL MEDICINE

## 2020-01-23 PROCEDURE — 99239 HOSP IP/OBS DSCHRG MGMT >30: CPT | Performed by: INTERNAL MEDICINE

## 2020-01-23 PROCEDURE — 94761 N-INVAS EAR/PLS OXIMETRY MLT: CPT

## 2020-01-23 PROCEDURE — 2580000003 HC RX 258: Performed by: NURSE PRACTITIONER

## 2020-01-23 PROCEDURE — 2580000003 HC RX 258: Performed by: INTERNAL MEDICINE

## 2020-01-23 PROCEDURE — 6370000000 HC RX 637 (ALT 250 FOR IP): Performed by: INTERNAL MEDICINE

## 2020-01-23 RX ORDER — DOFETILIDE 0.25 MG/1
250 CAPSULE ORAL EVERY 12 HOURS SCHEDULED
Qty: 60 CAPSULE | Refills: 3 | Status: ON HOLD | OUTPATIENT
Start: 2020-01-23 | End: 2020-06-16

## 2020-01-23 RX ORDER — SUCRALFATE 1 G/1
1 TABLET ORAL 4 TIMES DAILY
Qty: 120 TABLET | Refills: 3 | Status: SHIPPED | OUTPATIENT
Start: 2020-01-23 | End: 2020-01-30

## 2020-01-23 RX ORDER — METOPROLOL SUCCINATE 50 MG/1
50 TABLET, EXTENDED RELEASE ORAL 2 TIMES DAILY
Qty: 30 TABLET | Refills: 3 | Status: SHIPPED | OUTPATIENT
Start: 2020-01-23 | End: 2020-02-25

## 2020-01-23 RX ORDER — GUAIFENESIN 600 MG/1
600 TABLET, EXTENDED RELEASE ORAL 2 TIMES DAILY PRN
Qty: 10 TABLET | Refills: 0 | Status: SHIPPED | OUTPATIENT
Start: 2020-01-23 | End: 2020-01-28

## 2020-01-23 RX ORDER — PANTOPRAZOLE SODIUM 40 MG/1
40 TABLET, DELAYED RELEASE ORAL 2 TIMES DAILY
Qty: 30 TABLET | Refills: 3 | Status: SHIPPED | OUTPATIENT
Start: 2020-01-23 | End: 2020-01-30

## 2020-01-23 RX ORDER — CLINDAMYCIN HYDROCHLORIDE 300 MG/1
300 CAPSULE ORAL 2 TIMES DAILY
Qty: 10 CAPSULE | Refills: 0 | Status: SHIPPED | OUTPATIENT
Start: 2020-01-23 | End: 2020-01-28

## 2020-01-23 RX ADMIN — Medication 10 ML: at 02:14

## 2020-01-23 RX ADMIN — SUCRALFATE 1 G: 1 TABLET ORAL at 09:06

## 2020-01-23 RX ADMIN — METOPROLOL SUCCINATE 50 MG: 50 TABLET, EXTENDED RELEASE ORAL at 09:06

## 2020-01-23 RX ADMIN — DOFETILIDE 250 MCG: 0.25 CAPSULE ORAL at 09:07

## 2020-01-23 RX ADMIN — CLINDAMYCIN HYDROCHLORIDE 300 MG: 150 CAPSULE ORAL at 09:06

## 2020-01-23 RX ADMIN — SUCRALFATE 1 G: 1 TABLET ORAL at 02:13

## 2020-01-23 RX ADMIN — GUAIFENESIN 600 MG: 600 TABLET, EXTENDED RELEASE ORAL at 09:06

## 2020-01-23 RX ADMIN — Medication 10 ML: at 09:06

## 2020-01-23 RX ADMIN — PANTOPRAZOLE SODIUM 40 MG: 40 TABLET, DELAYED RELEASE ORAL at 09:06

## 2020-01-23 NOTE — PROCEDURES
Electrophysiology Procedure Report    Attending   Parveen Campbell MD    Procedures Atrial fibrillation ablation  Vascular access using ultrasound guidance  Transseptal catheterization  Wide area circumferential ablation with PV isolation  Intra-cardiac echocardiography    Indications  Atrial fibrillation    Description of Procedure   After the risks and benefits of the procedure were explained and informed consent was obtained, the patient was brought to the electrophysiology lab in the nonsedated and fasting state. General anesthesia was administered under anesthesia physician/nurse guidance (please see anesthesia records for medication details). A Soriano catheter was placed prior to the start of the procedure. The patient was connected to the EP lab monitoring system. The patient was prepared and draped in standard fashion. Using the modified Seldinger technique, the following sheaths and catheters were inserted under ultrasound guidance:    1. A 8 F sheath was inserted into the right femoral vein. This sheath was exchanged for SL-1 sheath. Ablation catheter was inserted into the RA to generate map. After right atrial map was created a BRK1 needle was inserted into the SL-1 sheath. This was advanced tommy the the left atrium via the modified Brockenbrough procedure for trans-septal puncture under ultrasound and fluoroscopy guidance. The SL-1 sheath was exchanged for Vizigo sheath over a long wire and placed on drip. Yrn Kava catheter was advanced into the left atrium for mapping using SL-1 sheath. 2. A 8 F sheath was inserted into the right femoral vein. This sheath was exchanged for the SL-1 sheath. A BRK1 needle was inserted into the SL-1 sheath. This was advanced tommy the the left atrium via the modified Brockenbrough procedure for trans-septal puncture under ultrasound and fluorsoscopy guidance. The SL-1 sheath was exchanged for Vizogo sheath.   Ablation catheter was advanced into the left atrium via the Vizigo sheath. 3.  A 8 F sheath was inserted into the left femoral vein. Bidirectional multipolar catheter was inserted via this sheath into the coronary sinus. 4.  A 9 F sheath was inserted into the left femoral vein. Via this sheath the ICE catheter was advanced into the right atrium and right ventricle for transseptal, mapping, and functional/mechanical evaluation during ablation. The patient arrived in atrial fibrillation. Prior to transseptal catheterization, IV heparin was admistered for a target ACT >300 for the duration of the procedure via heparin bolus and drip. Transseptal catheterization was performed under intracardiac echocardiographic guidance. The PVs were mapped with a Pentray catheter, demonstrating conduction into the PVs.  WACA was performed and bilateral PVAI was achieved with right sided kayode line. The PVs were remapped with the Pentray catheter and demonstrated entrance and exit block. Of note, esophageal temperature monitoring was performed using the multi-electrode probe throughout the ablation, and power and lesions were adjusted to avoid temperatures >= 38.5C. Of note, the esophagus was located in close proximity the right PV and in this region the maximal power delivered was 25W and the maximal temperature was 37.6 C. During ablation of the right pulmonary veins patient's atrial fibrillation terminated and he went into sinus bradycardia. Bilateral aidan lines were placed to complete PV isolation. Adenosine testing revealed no latent/recurrent PV conduction in all pulmonary veins. Given patients history of atrial flutter we attempted to perform a CTI ablation however mapping showed no conduction over CTI. Catheters were removed and heparin was reversed per standard protocol. The patient was allowed to awake from anesthesia and was returned to prep and recovery for further recovery.    Dr. Mustapha Cary, the attending physician, was present throughout the procedure and for interpretation of the data. Complications  none  EBL   <50cc    RESULTS    ECG   Baseline   Atrial fibrillation      Post Ablation   Normal sinus rhythm with nonspecific ST-T wave abnormalities    Hemodynamic Data  Arterial pressure mean 67 mmHg  Right atrial pressure mean 12 mmHg  Left atrial presure mean 13 mmHg    Basic Intervals  Status Rhythm PA AH HV MI QRS QT RR   Post NSR - - - 182 [phone]     Impression  -  Two left and two right pulmonary veins with slightly elevated right and left atrial pressures. -  Successful wide area circumferential ablation.  -  Successful ablation and isolation of pulmonary veins. Plan  - Decrease dofetilide to 250 mcg BID.  - Continue metoprolol.  - Start on pantoprazole 40 mg BID. - Start on sucralfate for 2 weeks post ablation.  - Continue rivaroxaban.     Anteroposterior Projection      Posteroanterior Projection Anesthesia Type: 0.1% Kleins solution (tumescent anesthesia)

## 2020-01-23 NOTE — PROGRESS NOTES
Right radial site with some oozing tr band removed site cleansed tr band replaced with 9cc air no further oozing noted.

## 2020-01-23 NOTE — DISCHARGE SUMMARY
active all four quadrants,     no masses, no organomegaly   Genitalia:    deferred   Rectal:    deferred    Extremities:   Extremities normal, atraumatic, no cyanosis or edema; bilateral groin access sites stable without hematoma   Pulses:   2+ and symmetric all extremities   Skin:   Skin color, texture, turgor normal, no rashes or lesions, bruising on bilateral groins. Neurologic:   CNII-XII intact grossly. Disposition: home    Patient Instructions:   Current Discharge Medication List      START taking these medications    Details   dofetilide (TIKOSYN) 250 MCG capsule Take 1 capsule by mouth every 12 hours  Qty: 60 capsule, Refills: 3      metoprolol succinate (TOPROL XL) 50 MG extended release tablet Take 1 tablet by mouth 2 times daily  Qty: 30 tablet, Refills: 3      guaiFENesin (MUCINEX) 600 MG extended release tablet Take 1 tablet by mouth 2 times daily as needed for Congestion  Qty: 10 tablet, Refills: 0      clindamycin (CLEOCIN) 300 MG capsule Take 1 capsule by mouth 2 times daily for 5 days  Qty: 10 capsule, Refills: 0      pantoprazole (PROTONIX) 40 MG tablet Take 1 tablet by mouth 2 times daily  Qty: 30 tablet, Refills: 3      sucralfate (CARAFATE) 1 GM tablet Take 1 tablet by mouth 4 times daily for 14 days  Qty: 120 tablet, Refills: 3         CONTINUE these medications which have NOT CHANGED    Details   rivaroxaban (XARELTO) 20 MG TABS tablet Take 1 tablet by mouth daily (with breakfast)  Qty: 90 tablet, Refills: 3         STOP taking these medications       sotalol (BETAPACE) 120 MG tablet Comments:   Reason for Stopping:             Post procedure instructions given  Activity: as tolerated  Diet: cardiac diet    Follow up in office on 1 month with Brenda Ca and 3 months with me. MD Davis Alfred 81  [phone]     Time spent on discharge of patient: 45 minutes, including plan of care, patient education, and care coordination.

## 2020-01-24 ENCOUNTER — TELEPHONE (OUTPATIENT)
Dept: CARDIOLOGY CLINIC | Age: 70
End: 2020-01-24

## 2020-01-24 ENCOUNTER — HOSPITAL ENCOUNTER (EMERGENCY)
Age: 70
Discharge: HOME | End: 2020-01-24
Attending: EMERGENCY MEDICINE
Payer: COMMERCIAL

## 2020-01-24 ENCOUNTER — APPOINTMENT (OUTPATIENT)
Dept: GENERAL RADIOLOGY | Age: 70
End: 2020-01-24
Payer: COMMERCIAL

## 2020-01-24 VITALS
TEMPERATURE: 99.8 F | HEART RATE: 89 BPM | DIASTOLIC BLOOD PRESSURE: 77 MMHG | RESPIRATION RATE: 20 BRPM | SYSTOLIC BLOOD PRESSURE: 136 MMHG | BODY MASS INDEX: 44.1 KG/M2 | OXYGEN SATURATION: 93 % | HEIGHT: 71 IN | WEIGHT: 315 LBS

## 2020-01-24 LAB
A/G RATIO: 1.3 (ref 1.1–2.2)
ALBUMIN SERPL-MCNC: 3.8 G/DL (ref 3.4–5)
ALP BLD-CCNC: 67 U/L (ref 40–129)
ALT SERPL-CCNC: 11 U/L (ref 10–40)
ANION GAP SERPL CALCULATED.3IONS-SCNC: 9 MMOL/L (ref 3–16)
AST SERPL-CCNC: 30 U/L (ref 15–37)
BASOPHILS ABSOLUTE: 0 K/UL (ref 0–0.2)
BASOPHILS RELATIVE PERCENT: 0.4 %
BILIRUB SERPL-MCNC: 0.5 MG/DL (ref 0–1)
BUN BLDV-MCNC: 20 MG/DL (ref 7–20)
CALCIUM SERPL-MCNC: 8.4 MG/DL (ref 8.3–10.6)
CHLORIDE BLD-SCNC: 104 MMOL/L (ref 99–110)
CO2: 27 MMOL/L (ref 21–32)
CREAT SERPL-MCNC: 0.8 MG/DL (ref 0.8–1.3)
D DIMER: <200 NG/ML DDU (ref 0–229)
EKG ATRIAL RATE: 70 BPM
EKG DIAGNOSIS: NORMAL
EKG P AXIS: -1 DEGREES
EKG P-R INTERVAL: 194 MS
EKG Q-T INTERVAL: 402 MS
EKG QRS DURATION: 86 MS
EKG QTC CALCULATION (BAZETT): 434 MS
EKG R AXIS: 25 DEGREES
EKG T AXIS: 15 DEGREES
EKG VENTRICULAR RATE: 70 BPM
EOSINOPHILS ABSOLUTE: 0.1 K/UL (ref 0–0.6)
EOSINOPHILS RELATIVE PERCENT: 2 %
GFR AFRICAN AMERICAN: >60
GFR NON-AFRICAN AMERICAN: >60
GLOBULIN: 2.9 G/DL
GLUCOSE BLD-MCNC: 104 MG/DL (ref 70–99)
HCT VFR BLD CALC: 35.5 % (ref 40.5–52.5)
HEMOGLOBIN: 11.8 G/DL (ref 13.5–17.5)
LYMPHOCYTES ABSOLUTE: 1.2 K/UL (ref 1–5.1)
LYMPHOCYTES RELATIVE PERCENT: 18.1 %
MCH RBC QN AUTO: 31.2 PG (ref 26–34)
MCHC RBC AUTO-ENTMCNC: 33.3 G/DL (ref 31–36)
MCV RBC AUTO: 93.8 FL (ref 80–100)
MONOCYTES ABSOLUTE: 0.5 K/UL (ref 0–1.3)
MONOCYTES RELATIVE PERCENT: 8.5 %
NEUTROPHILS ABSOLUTE: 4.5 K/UL (ref 1.7–7.7)
NEUTROPHILS RELATIVE PERCENT: 71 %
PDW BLD-RTO: 15 % (ref 12.4–15.4)
PLATELET # BLD: 115 K/UL (ref 135–450)
PMV BLD AUTO: 6.4 FL (ref 5–10.5)
POTASSIUM REFLEX MAGNESIUM: 4.3 MMOL/L (ref 3.5–5.1)
RBC # BLD: 3.78 M/UL (ref 4.2–5.9)
SODIUM BLD-SCNC: 140 MMOL/L (ref 136–145)
TOTAL PROTEIN: 6.7 G/DL (ref 6.4–8.2)
TROPONIN: 0.22 NG/ML
TROPONIN: 0.26 NG/ML
WBC # BLD: 6.4 K/UL (ref 4–11)

## 2020-01-24 PROCEDURE — 85025 COMPLETE CBC W/AUTO DIFF WBC: CPT

## 2020-01-24 PROCEDURE — 80053 COMPREHEN METABOLIC PANEL: CPT

## 2020-01-24 PROCEDURE — 6370000000 HC RX 637 (ALT 250 FOR IP): Performed by: PHYSICIAN ASSISTANT

## 2020-01-24 PROCEDURE — 93005 ELECTROCARDIOGRAM TRACING: CPT | Performed by: PHYSICIAN ASSISTANT

## 2020-01-24 PROCEDURE — 96376 TX/PRO/DX INJ SAME DRUG ADON: CPT

## 2020-01-24 PROCEDURE — 85379 FIBRIN DEGRADATION QUANT: CPT

## 2020-01-24 PROCEDURE — 84484 ASSAY OF TROPONIN QUANT: CPT

## 2020-01-24 PROCEDURE — 96374 THER/PROPH/DIAG INJ IV PUSH: CPT

## 2020-01-24 PROCEDURE — 71045 X-RAY EXAM CHEST 1 VIEW: CPT

## 2020-01-24 PROCEDURE — 6360000002 HC RX W HCPCS: Performed by: PHYSICIAN ASSISTANT

## 2020-01-24 PROCEDURE — 99285 EMERGENCY DEPT VISIT HI MDM: CPT

## 2020-01-24 PROCEDURE — 93010 ELECTROCARDIOGRAM REPORT: CPT | Performed by: INTERNAL MEDICINE

## 2020-01-24 RX ORDER — ACETAMINOPHEN 500 MG
500 TABLET ORAL 4 TIMES DAILY PRN
Qty: 120 TABLET | Refills: 0 | Status: SHIPPED | OUTPATIENT
Start: 2020-01-24

## 2020-01-24 RX ORDER — MORPHINE SULFATE 4 MG/ML
4 INJECTION, SOLUTION INTRAMUSCULAR; INTRAVENOUS ONCE
Status: COMPLETED | OUTPATIENT
Start: 2020-01-24 | End: 2020-01-24

## 2020-01-24 RX ORDER — ONDANSETRON 2 MG/ML
4 INJECTION INTRAMUSCULAR; INTRAVENOUS EVERY 6 HOURS PRN
Status: DISCONTINUED | OUTPATIENT
Start: 2020-01-24 | End: 2020-01-24 | Stop reason: HOSPADM

## 2020-01-24 RX ADMIN — MORPHINE SULFATE 4 MG: 4 INJECTION, SOLUTION INTRAMUSCULAR; INTRAVENOUS at 16:37

## 2020-01-24 RX ADMIN — ONDANSETRON HYDROCHLORIDE 4 MG: 2 INJECTION, SOLUTION INTRAMUSCULAR; INTRAVENOUS at 16:37

## 2020-01-24 RX ADMIN — ASPIRIN 325 MG: 325 TABLET, DELAYED RELEASE ORAL at 16:37

## 2020-01-24 RX ADMIN — MORPHINE SULFATE 4 MG: 4 INJECTION, SOLUTION INTRAMUSCULAR; INTRAVENOUS at 19:31

## 2020-01-24 ASSESSMENT — PAIN DESCRIPTION - LOCATION
LOCATION: CHEST
LOCATION: CHEST

## 2020-01-24 ASSESSMENT — PAIN SCALES - GENERAL
PAINLEVEL_OUTOF10: 8
PAINLEVEL_OUTOF10: 7
PAINLEVEL_OUTOF10: 8
PAINLEVEL_OUTOF10: 7

## 2020-01-24 ASSESSMENT — PAIN DESCRIPTION - PAIN TYPE: TYPE: ACUTE PAIN

## 2020-01-24 ASSESSMENT — ENCOUNTER SYMPTOMS
RESPIRATORY NEGATIVE: 1
GASTROINTESTINAL NEGATIVE: 1

## 2020-01-24 ASSESSMENT — PAIN DESCRIPTION - DESCRIPTORS: DESCRIPTORS: OTHER (COMMENT)

## 2020-01-24 NOTE — ED PROVIDER NOTES
effusion or an infectious/inflammatory process. This finding could be further evaluated via dedicated PA and lateral views of the chest when the patient is better able to tolerate imaging. Bedside Ultrasound Cardiac Exam:    Indication: Chest pain s/p cardiac ablation. Evaluation for pericardial effusion    General cardiac activity: present  Pericardial effusion: Absent   Right heart strain: Not evaluated    Impression: Good cardiac contractility with no pericadial effusion noted. Images obtained and read by myself. Images saved to ultrasound machine. ED course: Patient presenting for evaluation of sharp pleuritic type chest pain status post ablation several days ago. His troponin is slightly elevated but I would expected to be elevated to some degree with a recent procedure. EKG without acute ischemic changes. Troponin was repeated and noted to be trending down. Cardiology consulted and at this time states that they would expect some pain status post ablation and that at this time they felt that patient could be safely discharged home with outpatient follow-up. Reasons to return to the ER were discussed and all questions answered at time of discharge. All diagnostic, treatment, and disposition decisions were made by myself in conjunction with the advanced practice provider. For all further details of the patient's emergency department visit, please see the advanced practice provider's documentation. Comment: Please note this report has been produced using speech recognition software and may contain errors related to that system including errors in grammar, punctuation, and spelling, as well as words and phrases that may be inappropriate. If there are any questions or concerns please feel free to contact the dictating provider for clarification.        Tino Henriquez MD  01/24/20 2037

## 2020-01-24 NOTE — ED PROVIDER NOTES
[address] ED  EMERGENCY DEPARTMENT ENCOUNTER        Pt Name: Butch Calderon  MRN: [id number]  [place] 1950  Date of evaluation: 1/24/2020  Provider: Rl George PA-C  PCP: Lanny Aviles MD    This patient was seen and evaluated by the attending physician Dr. Mitzi Vilchis. CHIEF COMPLAINT       Chief Complaint   Patient presents with    Chest Pain     ablation on wednesday for afib. Chest pain started 10am       HISTORY OF PRESENT ILLNESS   (Location/Symptom, Timing/Onset, Context/Setting, Quality, Duration, Modifying Factors, Severity)  Note limiting factors. Butch Calderon is a 68 y.o. male brought in today for evaluation of chest pain. Patient has a history of persistent A. Fib had a recent ablation on Wednesday with Dr. Ria Sinclair. Patient brought in today for evaluation of left-sided chest pain. Pain started earlier today. He states pain is worse when he tries to take a breath. Onset of symptoms started today. Duration of symptoms have been intermittent in nature. Rates pain a 7 out of 10 without radiation of pain. He states he cannot describe exactly what the pain feels like. He states it is not a sharp pain. No aggravating or alleviating complaints. He has not tried anything for the pain at this time. Denies any personal cardiac history. Denies any history of hypertension or hyperlipidemia or diabetes. Denies any smoking history. Patient has not tried anything else at this time for symptomatic relief. Nursing Notes were all reviewed and agreed with or any disagreements were addressed in the HPI. REVIEW OF SYSTEMS    (2-9 systems for level 4, 10 or more for level 5)     Review of Systems   Constitutional: Negative. HENT: Negative. Respiratory: Negative. Cardiovascular: Positive for chest pain. Gastrointestinal: Negative. Genitourinary: Negative. Musculoskeletal: Negative. Skin: Negative. Neurological: Negative.         Positives and Pertinent negatives as per HPI. Except as noted above in the ROS, all other systems were reviewed and negative.        PAST MEDICAL HISTORY     Past Medical History:   Diagnosis Date    Arthritis     Atrial fibrillation ([address]     Hiatal hernia     Hypertrophy of prostate without urinary obstruction and other lower urinary tract symptoms (LUTS)     Kidney stone     2001    Neoplasm of uncertain behavior of skin     Obesity, unspecified     Organic sleep apnea, unspecified          SURGICAL HISTORY     Past Surgical History:   Procedure Laterality Date    ABLATION OF DYSRHYTHMIC FOCUS      COLONOSCOPY  04/01/2010    HERNIA REPAIR      umbilical 5830    LITHOTRIPSY Right 09/2016    OTHER SURGICAL HISTORY Right 09/25/2016    CYSTOSCOPY, RIGHT URETEROSCOPY, HOLMIUM LASER LITHOTRIPSY, STONE MANIPULATION AND EXTRACTION    OTHER SURGICAL HISTORY  10/12/2016    cystoscopy right stent removal    TONSILLECTOMY           CURRENTMEDICATIONS       Previous Medications    CLINDAMYCIN (CLEOCIN) 300 MG CAPSULE    Take 1 capsule by mouth 2 times daily for 5 days    DOFETILIDE (TIKOSYN) 250 MCG CAPSULE    Take 1 capsule by mouth every 12 hours    GUAIFENESIN (MUCINEX) 600 MG EXTENDED RELEASE TABLET    Take 1 tablet by mouth 2 times daily as needed for Congestion    METOPROLOL SUCCINATE (TOPROL XL) 50 MG EXTENDED RELEASE TABLET    Take 1 tablet by mouth 2 times daily    PANTOPRAZOLE (PROTONIX) 40 MG TABLET    Take 1 tablet by mouth 2 times daily    RIVAROXABAN (XARELTO) 20 MG TABS TABLET    Take 1 tablet by mouth daily (with breakfast)    SUCRALFATE (CARAFATE) 1 GM TABLET    Take 1 tablet by mouth 4 times daily for 14 days         ALLERGIES     Bee venom and Adhesive tape    FAMILYHISTORY       Family History   Problem Relation Age of Onset    Cancer Mother         breast met to bone    Emphysema Father     Cancer Maternal Uncle     Cancer Maternal Grandmother     Heart Failure Maternal Grandfather     Asthma Neg Hx     Diabetes Neg Hx     Hypertension Neg Hx           SOCIAL HISTORY       Social History     Tobacco Use    Smoking status: Former Smoker     Packs/day: 1.50     Years: 30.00     Pack years: 45.00     Last attempt to quit: 1988     Years since quittin.0    Smokeless tobacco: Never Used   Substance Use Topics    Alcohol use: No     Alcohol/week: 0.0 standard drinks    Drug use: No       SCREENINGS             PHYSICAL EXAM    (up to 7 for level 4, 8 or more for level 5)     ED Triage Vitals [20 1534]   BP Temp Temp Source Pulse Resp SpO2 Height Weight   (!) 148/129 98.3 °F (36.8 °C) Oral 73 16 98 % 5' 11\" (1.803 m) (!) 324 lb (147 kg)       Physical Exam  Vitals signs and nursing note reviewed. Constitutional:       General: He is awake. Appearance: Normal appearance. He is well-developed. He is obese. He is not ill-appearing, toxic-appearing or diaphoretic. HENT:      Head: Normocephalic and atraumatic. Nose: Nose normal.   Eyes:      General:         Right eye: No discharge. Left eye: No discharge. Neck:      Musculoskeletal: Normal range of motion and neck supple. Cardiovascular:      Rate and Rhythm: Normal rate and regular rhythm. Pulses:           Radial pulses are 2+ on the right side and 2+ on the left side. Heart sounds: Normal heart sounds. No murmur. No gallop. Pulmonary:      Effort: Pulmonary effort is normal. No respiratory distress. Breath sounds: Normal breath sounds. No decreased breath sounds, wheezing, rhonchi or rales. Chest:      Chest wall: No tenderness. Abdominal:      General: Abdomen is flat. Bowel sounds are normal.      Palpations: Abdomen is soft. Tenderness: There is no abdominal tenderness. There is no right CVA tenderness, left CVA tenderness, guarding or rebound. Musculoskeletal: Normal range of motion. General: No deformity. Skin:     General: Skin is warm and dry.    Neurological:      General: their note for interpretation of EKG. RADIOLOGY:   Non-plain film images such as CT, Ultrasound and MRI are read by the radiologist. Plain radiographic images are visualized and preliminarily interpreted by the  ED Provider with the below findings:        Interpretation per the Radiologist below, if available at the time of this note:    XR CHEST PORTABLE   Final Result   1. Low lung volumes. 2. Partial obscuration of the left hemidiaphragm with blunting of the left   costophrenic angle. This may be on the basis of atelectasis, pleural   effusion or an infectious/inflammatory process. This finding could be   further evaluated via dedicated PA and lateral views of the chest when the   patient is better able to tolerate imaging. No results found. PROCEDURES   Unless otherwise noted below, none     Procedures    CRITICAL CARE TIME   N/A    CONSULTS:  IP CONSULT TO CARDIOLOGY      EMERGENCY DEPARTMENT COURSE and DIFFERENTIAL DIAGNOSIS/MDM:   Vitals:    Vitals:    01/24/20 1707 01/24/20 1719 01/24/20 1907 01/24/20 1932   BP: 127/69  124/72    Pulse: 81 84 93 92   Resp: 28 25 25 20   Temp:       TempSrc:       SpO2: 93% 90% 91% 95%   Weight:       Height:           Patient was given thefollowing medications:  Medications   ondansetron (ZOFRAN) injection 4 mg (4 mg Intravenous Given 1/24/20 1637)   aspirin EC tablet 325 mg (325 mg Oral Given 1/24/20 1637)   morphine sulfate (PF) injection 4 mg (4 mg Intravenous Given 1/24/20 1637)   morphine sulfate (PF) injection 4 mg (4 mg Intravenous Given 1/24/20 1931)       Patient brought in today by private vehicle for complaints of left-sided chest pain. He is status post ablation for A. fib on this past Wednesday 2 days ago. On exam he is alert oriented afebrile breathing on room air satting in 98%. Appears nontoxic no respiratory distress. He does appear to be in pain. Old labs and records reviewed. CBC reveals no acute leukocytosis. Hemoglobin of 11.8. No electrolyte abnormalities. EKG was reviewed by my attending see note for dictation. We did ultrasound his heart and did not see any evidence of pericardial effusion. Please see my attendings note for the ultrasound results. EKG was reviewed by my attending see note for dictation. Troponin 0.26. D-dimer is less than 200. Given morphine Zofran here. Repeat troponin was 0.22. Chest x-ray was unremarkable. Partial obscuration of the left huey-diaphragm with blunting of the left costal angle. This may be on the basis of atelectasis, pleural effusion and/or infectious/inflammatory process. This finding could be further evaluated via dedicated PA and lateral views of the chest when patient is better able to tolerate imaging. I Spoke to cardiology Dr. Austyn Garvin states that the elevated troponin and his signs and symptoms are associated with a recent ablation. Most likely a pericarditis scenario. He instructed Tylenol for pain control at home. Patient was updated and told to continue with Tylenol at home and told to follow-up with his cardiologist who did his ablation Dr. Nabeel Garcia. He was told to return immediately to the ER with any new or worsening symptoms and educated on when to return. He verbalized understanding of this plan was comfortable and stable at time of discharge. I did feel comfortable sending this patient home with close follow-up instructions and strict return precautions. FINAL IMPRESSION      1.  Other chest pain          DISPOSITION/PLAN   DISPOSITION Decision To Discharge 01/24/2020 08:14:47 PM      PATIENT REFERREDTO:  Eric Barcenas MD  [address] [place] [address] [place]  [phone]    Schedule an appointment as soon as possible for a visit in 1 day      [facility] ED  [address] [address]  [phone]  Schedule an appointment as soon as possible for a visit   As needed, If symptoms

## 2020-01-25 NOTE — ED NOTES
Patient bedding and pants changed at this time. Patient provided with warm blanket per request. Patient denies any other wants or needs at this time. Call light within reach. Will continue to monitor.       Yeyo Booker RN  01/24/20 2041

## 2020-01-27 ENCOUNTER — TELEPHONE (OUTPATIENT)
Dept: FAMILY MEDICINE CLINIC | Age: 70
End: 2020-01-27

## 2020-01-28 ENCOUNTER — TELEPHONE (OUTPATIENT)
Dept: FAMILY MEDICINE CLINIC | Age: 70
End: 2020-01-28

## 2020-01-28 ENCOUNTER — TELEPHONE (OUTPATIENT)
Dept: NON INVASIVE DIAGNOSTICS | Age: 70
End: 2020-01-28

## 2020-01-28 NOTE — TELEPHONE ENCOUNTER
[facility] Trans[facility] Initial Follow Up Call    Outreach made within 2 business days of discharge: Yes    Patient: Claude Paz Patient : 1950   MRN: [id number]  Reason for Admission: There are no discharge diagnoses documented for the most recent discharge. Discharge Date: 2020       Spoke with: Patient    Discharge department/facility: 2020    TCM Interactive Patient Contact:  Was patient able to fill all prescriptions: Yes  Was patient instructed to bring all medications to the follow-up visit: Yes  Is patient taking all medications as directed in the discharge summary?  Yes  Does patient understand their discharge instructions: Yes  Does patient have questions or concerns that need addressed prior to 7-14 day follow up office visit: no    Scheduled appointment with PCP within 7-14 days    Follow Up  Future Appointments   Date Time Provider Duong Hunter   2020  1:45 PM CHARISSE May CNP   3/9/2020  3:00 PM SCHEDULE, [facility]ELIX MAHER AWV MARAH NICHOLSON   2020 11:15 AM Daphne Reed MD [address] [place] [address]

## 2020-01-30 ENCOUNTER — OFFICE VISIT (OUTPATIENT)
Dept: FAMILY MEDICINE CLINIC | Age: 70
End: 2020-01-30
Payer: COMMERCIAL

## 2020-01-30 VITALS
HEIGHT: 71 IN | OXYGEN SATURATION: 98 % | WEIGHT: 315 LBS | DIASTOLIC BLOOD PRESSURE: 70 MMHG | HEART RATE: 64 BPM | BODY MASS INDEX: 44.1 KG/M2 | SYSTOLIC BLOOD PRESSURE: 126 MMHG

## 2020-01-30 PROCEDURE — 1111F DSCHRG MED/CURRENT MED MERGE: CPT | Performed by: FAMILY MEDICINE

## 2020-01-30 PROCEDURE — 99495 TRANSJ CARE MGMT MOD F2F 14D: CPT | Performed by: FAMILY MEDICINE

## 2020-01-30 RX ORDER — CLINDAMYCIN HYDROCHLORIDE 300 MG/1
300 CAPSULE ORAL 2 TIMES DAILY
COMMUNITY
End: 2020-01-30 | Stop reason: ALTCHOICE

## 2020-01-30 ASSESSMENT — ENCOUNTER SYMPTOMS
BLOOD IN STOOL: 0
SHORTNESS OF BREATH: 1

## 2020-01-30 NOTE — PROGRESS NOTES
Post-Discharge Transitional Care Management Services or Hospital Follow Up      Erika Ulloapatti   YOB: 1950    Date of Office Visit:  1/30/2020  Date of Hospital Admission: 1/20/20  Date of Hospital Discharge: 1/23/20  Readmission Risk Score(high >=14%.  Medium >=10%):Readmission Risk Score: 12      Care management risk score Rising risk (score 2-5) and Complex Care (Scores >=6): 4     Non face to face  following discharge, date last encounter closed (first attempt may have been earlier): 1/28/2020  9:26 AM 1/28/2020  9:26 AM    Call initiated 2 business days of discharge: Yes     Patient Active Problem List   Diagnosis    Kidney stone    Obesity    Organic sleep apnea    Back pain    Morbid obesity with BMI of 40.0-44.9, adult ([address]    Renal colic on right side    Persistent atrial fibrillation    Atrial fibrillation (HCC)    Cellulitis and abscess of buttock    Upper respiratory tract infection    A-fib [address]       Allergies   Allergen Reactions    Bee Venom     Adhesive Tape Rash       Medications listed as ordered at the time of discharge from [facility] Medication Instructions ZAHIDA:    Printed on:01/30/20 1202   Medication Information                      acetaminophen (TYLENOL) 500 MG tablet  Take 1 tablet by mouth 4 times daily as needed for Pain             dofetilide (TIKOSYN) 250 MCG capsule  Take 1 capsule by mouth every 12 hours             metoprolol succinate (TOPROL XL) 50 MG extended release tablet  Take 1 tablet by mouth 2 times daily             rivaroxaban (XARELTO) 20 MG TABS tablet  Take 1 tablet by mouth daily (with breakfast)                   Medications marked \"taking\" at this time  Outpatient Medications Marked as Taking for the 1/30/20 encounter (Office Visit) with Lalo Morris MD   Medication Sig Dispense Refill    acetaminophen (TYLENOL) 500 MG tablet Take 1 tablet by mouth 4 times daily as needed for Pain 120 tablet 0    dofetilide (TIKOSYN) 250 MCG capsule Take 1 capsule by mouth every 12 hours 60 capsule 3    metoprolol succinate (TOPROL XL) 50 MG extended release tablet Take 1 tablet by mouth 2 times daily 30 tablet 3    rivaroxaban (XARELTO) 20 MG TABS tablet Take 1 tablet by mouth daily (with breakfast) 90 tablet 3        Medications patient taking as of now reconciled against medications ordered at time of hospital discharge: Yes    Chief Complaint   Patient presents with    Follow[facility]Up from [facility]     Patient was in [facility]. HPI    Inpatient course: Discharge summary reviewed- see chart. Interval history/Current status: initially admitted due to afib w/ rvr. Was started on tikosyn, dose adjusted. Transitioned to toprol. Ablation on 1/22. Did well. Kept 1 more day. Stayed in normal rhythm. QT ok. Overall doing well. Did have chest pain day after d/c and returned to ER. Pain was more pleuritic. Was still in nsr at that time. Cardio consulted at that time. Improved w/ tylenol. Currently on tikosyn, toprol, xarelto. Did not elizabet cpap. Still some fatigue, but o/w doing better. No palpitations noted. No known recurrent afib. Still gaining energy back, but seems to be improving. Has f/u w/ cardio on 2/25. Review of Systems   Constitutional: Positive for fatigue. Respiratory: Positive for shortness of breath (occas w/ activity). Cardiovascular: Negative for chest pain and palpitations. Gastrointestinal: Negative for blood in stool. Genitourinary: Negative for hematuria. Vitals:    01/30/20 1122   BP: 126/70   Pulse: 64   SpO2: 98%   Weight: (!) 327 lb 3.2 oz (148.4 kg)   Height: 5' 11\" (1.803 m)     Body mass index is 45.64 kg/m².    Wt Readings from Last 3 Encounters:   01/30/20 (!) 327 lb 3.2 oz (148.4 kg)   01/24/20 (!) 324 lb (147 kg)   01/21/20 (!) 315 lb 12.8 oz (143.2 kg)     BP Readings from Last 3 Encounters:   01/30/20 126/70   01/24/20 136/77   01/23/20 139/79 Physical Exam  Constitutional:       Appearance: He is well-developed. HENT:      Head: Normocephalic and atraumatic. Eyes:      Conjunctiva/sclera: Conjunctivae normal.   Neck:      Trachea: No tracheal deviation. Cardiovascular:      Rate and Rhythm: Normal rate and regular rhythm. No extrasystoles are present. Pulmonary:      Effort: Pulmonary effort is normal.   Abdominal:      Palpations: Abdomen is soft. Tenderness: There is no abdominal tenderness. Musculoskeletal:      Right lower leg: Edema present. Left lower leg: Edema present. Neurological:      General: No focal deficit present. Mental Status: He is alert and oriented to person, place, and time. Psychiatric:         Mood and Affect: Mood normal.         Behavior: Behavior normal.             Assessment/Plan:  1. Atrial fibrillation, unspecified type [facility])  S/p ablation. Doing well. Cont meds. F/u w/ cardio as sched. - [place] DISCHARGE MEDS RECONCILED W/ CURRENT OUTPATIENT MED LIST    2. Anemia, unspecified type  Mild on recent labs. Rpt in few weeks. No evidence of bleeding    Discussed diet, activity, weight loss.           Medical Decision Making: moderate complexity

## 2020-02-25 ENCOUNTER — OFFICE VISIT (OUTPATIENT)
Dept: CARDIOLOGY CLINIC | Age: 70
End: 2020-02-25
Payer: COMMERCIAL

## 2020-02-25 VITALS
HEIGHT: 71 IN | HEART RATE: 53 BPM | SYSTOLIC BLOOD PRESSURE: 134 MMHG | BODY MASS INDEX: 44.1 KG/M2 | OXYGEN SATURATION: 97 % | DIASTOLIC BLOOD PRESSURE: 68 MMHG | WEIGHT: 315 LBS

## 2020-02-25 PROCEDURE — 93000 ELECTROCARDIOGRAM COMPLETE: CPT | Performed by: NURSE PRACTITIONER

## 2020-02-25 PROCEDURE — 99214 OFFICE O/P EST MOD 30 MIN: CPT | Performed by: NURSE PRACTITIONER

## 2020-02-25 RX ORDER — METOPROLOL SUCCINATE 50 MG/1
50 TABLET, EXTENDED RELEASE ORAL DAILY
Qty: 30 TABLET | Refills: 3
Start: 2020-02-25 | End: 2020-04-14 | Stop reason: SDUPTHER

## 2020-02-25 NOTE — PROGRESS NOTES
wheezing, rhonchi, and rales  Cardiovascular:     · The apical impulse is not displaced  · Heart tones are crisp and normal. Regular S1 and S2.  · Jugular venous pulsation Normal  · The carotid upstroke is normal in amplitude and contour without delay or bruit  · Peripheral pulses are symmetrical and full   Abdomen:  · No masses or tenderness  · Bowel sounds present  Extremities:  ·  No cyanosis or clubbing  ·  Trace lower extremity edema; + evidence of PVD  ·  Skin: warm and dry  Neurological:  · Alert and oriented  · Moves all extremities well  · No abnormalities of mood, affect, memory, mentation, or behavior are noted    DATA:    ECG 2/25/2020:  SB HR 57    Echo 7/17/2018:  Low normal left ventricular systolic function with an estimated ejection  fraction of 50-55%. Normal left ventricular diastolic filling pressure. Right ventricular systolic function is mildly reduced to low normal.  The right ventricle is mildly enlarged. The right atrium is mildly dilated. Mild mitral regurgitation. Mild aortic regurgitation. Mild tricuspid regurgitation. Normal systolic pulmonary artery pressure (SPAP) estimated at 36 mmHg (RA pressure 8 mmHg). Mild pulmonic regurgitation. Stress 4/11/2019: Abnormal low risk myocardial perfusion study.    Cannot exclude small area of ischemia within the apical segment.    Normal left ventricular size, wall motion, and function.    The estimated left ventricular function is 64%. All labs and testing reviewed. CARDIOLOGY LABS:   CBC: No results for input(s): WBC, HGB, HCT, PLT in the last 72 hours. BMP: No results for input(s): NA, K, CO2, BUN, CREATININE, LABGLOM, GLUCOSE in the last 72 hours. PT/INR: No results for input(s): PROTIME, INR in the last 72 hours. APTT:No results for input(s): APTT in the last 72 hours.   FASTING LIPID PANEL:  Lab Results   Component Value Date    HDL 39 05/17/2016    [address] 90 05/17/2016    TRIG 94 05/17/2016     LIVER PROFILE:No results for

## 2020-02-25 NOTE — LETTER
· Resp auscultation: Normal breath sounds without wheezing, rhonchi, and rales  Cardiovascular:     · The apical impulse is not displaced  · Heart tones are crisp and normal. Regular S1 and S2.  · Jugular venous pulsation Normal  · The carotid upstroke is normal in amplitude and contour without delay or bruit  · Peripheral pulses are symmetrical and full   Abdomen:  · No masses or tenderness  · Bowel sounds present  Extremities:  ·  No cyanosis or clubbing  ·  Trace lower extremity edema; + evidence of PVD  ·  Skin: warm and dry  Neurological:  · Alert and oriented  · Moves all extremities well  · No abnormalities of mood, affect, memory, mentation, or behavior are noted    DATA:    ECG 2/25/2020:  SB HR 57    Echo 7/17/2018:  Low normal left ventricular systolic function with an estimated ejection  fraction of 50-55%. Normal left ventricular diastolic filling pressure. Right ventricular systolic function is mildly reduced to low normal.  The right ventricle is mildly enlarged. The right atrium is mildly dilated. Mild mitral regurgitation. Mild aortic regurgitation. Mild tricuspid regurgitation. Normal systolic pulmonary artery pressure (SPAP) estimated at 36 mmHg (RA pressure 8 mmHg). Mild pulmonic regurgitation. Stress 4/11/2019: Abnormal low risk myocardial perfusion study.    Cannot exclude small area of ischemia within the apical segment.    Normal left ventricular size, wall motion, and function.    The estimated left ventricular function is 64%. All labs and testing reviewed. CARDIOLOGY LABS:   CBC: No results for input(s): WBC, HGB, HCT, PLT in the last 72 hours. BMP: No results for input(s): NA, K, CO2, BUN, CREATININE, LABGLOM, GLUCOSE in the last 72 hours. PT/INR: No results for input(s): PROTIME, INR in the last 72 hours. APTT:No results for input(s): APTT in the last 72 hours.   FASTING LIPID PANEL:  Lab Results   Component Value Date    HDL 39 05/17/2016    [address] 90 05/17/2016 TRIG 94 05/17/2016     LIVER PROFILE:No results for input(s): AST, ALT, ALB in the last 72 hours. Assessment:   1. Symptomatic paroxysmal (formerly persistent) atrial fibrillation: stable   -s/p successful CV 8/2018, 11/2018, and 6/2019 (on Rythmol)   -sotalol started 11/2019, s/p CV 11/2019   -s/p RFCA 1/2020    -[id number] score 1 (age)  2. Morbid obesity  3. Sleep apnea: cannot tolerate CPAP  4. Chest pain: new problem    Plan:   1. Continue Tikosyn and Xarelto   2. Decrease Toprol to 50mg po QD due to bradycardia and fatigue   3. Reviewed stress test/discussed chest pain with Dr. Cachorro Phillips. MD recommending interventional cardiology for chest pain.   4. Follow up as scheduled in 4/2020 with Dr. Carla Cardenas, [address]  [phone]

## 2020-03-03 NOTE — PROGRESS NOTES
[address] [facility]   Cardiovascular Evaluation    PATIENT: Jennifer Zuniga Asai: 3/5/2020  MRN: [id number]  CSN: [id number]  : 1950      Primary Care Doctor: Areta Goldmann, MD  Reason for evaluation:   New Patient      Subjective:   History of present illness on initial date of evaluation:   Marta Dominguez is a 68 y.o. male who presents as a new patient to me. He has a PMH of afib diagnosed 2018. He has underwent 3 successful cardioversion in the past however returned to afib. 20 He underwent RFCA. He followed up with Thais Hampton 20 c/o rare 'twinge' in the left chest area that typically occurs after exertion, lasts 10-15 minutes (relieved with rest). Today he reports rare occasional CP. He denies chest pressure or heaviness with exertion. Denies , shortness of breath, edema, dizziness, palpitations and syncope. Patient Active Problem List   Diagnosis    Kidney stone    Obesity    Organic sleep apnea    Back pain    Morbid obesity with BMI of 40.0-44.9, adult (HCC)    Renal colic on right side    Paroxysmal atrial fibrillation (HCC)    Cellulitis and abscess of buttock    Chest pain         Past Medical History:   has a past medical history of Arthritis, Atrial fibrillation ([address] Hiatal hernia, Hypertrophy of prostate without urinary obstruction and other lower urinary tract symptoms (LUTS), Kidney stone, Neoplasm of uncertain behavior of skin, Obesity, unspecified, and Organic sleep apnea, unspecified. Surgical History:   has a past surgical history that includes hernia repair; Tonsillectomy; Colonoscopy (2010); Lithotripsy (Right, 2016); other surgical history (Right, 2016); other surgical history (10/12/2016); and ablation of dysrhythmic focus. Social History:   reports that he quit smoking about 32 years ago. He has a 45.00 pack-year smoking history.  He has never used smokeless tobacco. He reports that he does not drink alcohol or use drugs. Family History:  No evidence for sudden cardiac death or premature CAD    Home Medications:  Reviewed and are listed in nursing record. and/or listed below  Current Outpatient Medications   Medication Sig Dispense Refill    metoprolol succinate (TOPROL XL) 50 MG extended release tablet Take 1 tablet by mouth daily 30 tablet 3    acetaminophen (TYLENOL) 500 MG tablet Take 1 tablet by mouth 4 times daily as needed for Pain 120 tablet 0    dofetilide (TIKOSYN) 250 MCG capsule Take 1 capsule by mouth every 12 hours 60 capsule 3    rivaroxaban (XARELTO) 20 MG TABS tablet Take 1 tablet by mouth daily (with breakfast) 90 tablet 3     No current facility-administered medications for this visit. Allergies:  Bee venom and Adhesive tape     Review of Systems:   A 14 point review of symptoms completed. Pertinent positives identified in the HPI, all other review of symptoms negative as below.     Objective:   PHYSICAL EXAM:    Vitals:    03/05/20 0933   BP: 120/77   Pulse: 63   Resp: 16   Temp: 97.8 °F (36.6 °C)   SpO2: 95%    Weight: (!) 325 lb 12.8 oz (147.8 kg)     Wt Readings from Last 3 Encounters:   03/05/20 (!) 325 lb 12.8 oz (147.8 kg)   02/25/20 (!) 320 lb (145.2 kg)   01/30/20 (!) 327 lb 3.2 oz (148.4 kg)         General Appearance:  Alert, cooperative, no distress, appears stated age, obese   Head:  Normocephalic, atraumatic   Eyes:  PERRL, conjunctiva/corneas clear   Nose: Nares normal, no drainage or sinus tenderness   Throat: Lips, mucosa, and tongue normal   Neck: Supple, symmetrical, trachea midline, NL thyroid no carotid bruit or JVD   Lungs:   CTAB, respirations unlabored   Chest Wall:  No tenderness or deformity   Heart:  Regular rhythm and normal rate; S1, S2 are normal;   no murmur noted; no rub or gallop   Abdomen:   Soft, non-tender, +BS x 4, no masses, no organomegaly   Extremities: Extremities normal, atraumatic, no cyanosis or edema   Pulses: 2+ and symmetric   Skin:

## 2020-03-05 ENCOUNTER — OFFICE VISIT (OUTPATIENT)
Dept: CARDIOLOGY CLINIC | Age: 70
End: 2020-03-05
Payer: COMMERCIAL

## 2020-03-05 VITALS
HEIGHT: 71 IN | OXYGEN SATURATION: 95 % | SYSTOLIC BLOOD PRESSURE: 120 MMHG | BODY MASS INDEX: 44.1 KG/M2 | TEMPERATURE: 97.8 F | RESPIRATION RATE: 16 BRPM | DIASTOLIC BLOOD PRESSURE: 77 MMHG | WEIGHT: 315 LBS | HEART RATE: 63 BPM

## 2020-03-05 PROBLEM — R07.9 CHEST PAIN: Status: ACTIVE | Noted: 2020-03-05

## 2020-03-05 PROCEDURE — 99214 OFFICE O/P EST MOD 30 MIN: CPT | Performed by: INTERNAL MEDICINE

## 2020-03-06 ENCOUNTER — TELEPHONE (OUTPATIENT)
Dept: CARDIOLOGY CLINIC | Age: 70
End: 2020-03-06

## 2020-03-12 ENCOUNTER — HOSPITAL ENCOUNTER (OUTPATIENT)
Dept: CT IMAGING | Age: 70
Discharge: HOME | End: 2020-03-12
Payer: COMMERCIAL

## 2020-03-12 ENCOUNTER — HOSPITAL ENCOUNTER (OUTPATIENT)
Age: 70
Discharge: HOME | End: 2020-03-12
Payer: COMMERCIAL

## 2020-03-12 ENCOUNTER — HOSPITAL ENCOUNTER (OUTPATIENT)
Dept: NON INVASIVE DIAGNOSTICS | Age: 70
Discharge: HOME | End: 2020-03-12
Payer: COMMERCIAL

## 2020-03-12 VITALS
TEMPERATURE: 98.1 F | HEIGHT: 71 IN | OXYGEN SATURATION: 93 % | BODY MASS INDEX: 44.1 KG/M2 | SYSTOLIC BLOOD PRESSURE: 144 MMHG | HEART RATE: 78 BPM | DIASTOLIC BLOOD PRESSURE: 86 MMHG | WEIGHT: 315 LBS | RESPIRATION RATE: 18 BRPM

## 2020-03-12 LAB
ANION GAP SERPL CALCULATED.3IONS-SCNC: 10 MMOL/L (ref 3–16)
BUN BLDV-MCNC: 17 MG/DL (ref 7–20)
CALCIUM SERPL-MCNC: 8.9 MG/DL (ref 8.3–10.6)
CHLORIDE BLD-SCNC: 105 MMOL/L (ref 99–110)
CO2: 25 MMOL/L (ref 21–32)
CREAT SERPL-MCNC: 0.7 MG/DL (ref 0.8–1.3)
GFR AFRICAN AMERICAN: >60
GFR NON-AFRICAN AMERICAN: >60
GLUCOSE BLD-MCNC: 111 MG/DL (ref 70–99)
LV EF: 55 %
LVEF MODALITY: NORMAL
POTASSIUM SERPL-SCNC: 3.9 MMOL/L (ref 3.5–5.1)
SODIUM BLD-SCNC: 140 MMOL/L (ref 136–145)

## 2020-03-12 PROCEDURE — C8929 TTE W OR WO FOL WCON,DOPPLER: HCPCS

## 2020-03-12 PROCEDURE — 80048 BASIC METABOLIC PNL TOTAL CA: CPT

## 2020-03-12 PROCEDURE — 6360000004 HC RX CONTRAST MEDICATION: Performed by: INTERNAL MEDICINE

## 2020-03-12 PROCEDURE — 7100000010 HC PHASE II RECOVERY - FIRST 15 MIN

## 2020-03-12 PROCEDURE — 75572 CT HRT W/3D IMAGE: CPT

## 2020-03-12 RX ADMIN — PERFLUTREN 2.2 MG: 6.52 INJECTION, SUSPENSION INTRAVENOUS at 10:09

## 2020-03-12 RX ADMIN — IOPAMIDOL 100 ML: 755 INJECTION, SOLUTION INTRAVENOUS at 09:14

## 2020-03-12 ASSESSMENT — PAIN - FUNCTIONAL ASSESSMENT: PAIN_FUNCTIONAL_ASSESSMENT: 0-10

## 2020-03-13 ENCOUNTER — TELEPHONE (OUTPATIENT)
Dept: CARDIOLOGY CLINIC | Age: 70
End: 2020-03-13

## 2020-03-13 NOTE — TELEPHONE ENCOUNTER
Message left for patient to call back of he needed testing and message per Rachel Hernandez reviewed. Also need to see where he needs Lipitor called in to.

## 2020-03-13 NOTE — TELEPHONE ENCOUNTER
----- Message from Tammi Abbott MD sent at 3/12/2020  6:18 PM EDT -----  Please let pt know that echo showed normal LVEF. The aortic root is enlarged and will need to be followed. This was present previously but under appreciated. Should get another echo in 6 months to follow this.

## 2020-03-13 NOTE — TELEPHONE ENCOUNTER
----- Message from Kristen Ochoa MD sent at 3/13/2020  1:42 PM EDT -----  Called pt to inform of Cardiac CT- no ans, left LM. Showed mild CAd with no severe blockages. Suggest cont toprol, start ASA 81mg poqday and Lipitor 20mg po qhs (call with myalgias). IF still having CP, see in office in 2-4 wks.  If not, then can f/u in 6 months and will check lipids at that time

## 2020-03-17 RX ORDER — HYDROCODONE BITARTRATE AND ACETAMINOPHEN 7.5; 325 MG/1; MG/1
1 TABLET ORAL EVERY 6 HOURS PRN
Qty: 30 TABLET | Refills: 0 | Status: SHIPPED | OUTPATIENT
Start: 2020-03-17 | End: 2020-04-16

## 2020-03-30 ENCOUNTER — TELEPHONE (OUTPATIENT)
Dept: CARDIOLOGY CLINIC | Age: 70
End: 2020-03-30

## 2020-03-31 NOTE — TELEPHONE ENCOUNTER
Pt called and was checking on his Rx, was sent from [facility] yesterday. Pt states that he only has 1 day left and doesn't want to miss a dose. Told Pt we would put a message through to [facility], to hopefully get signed ASAP.

## 2020-04-01 RX ORDER — RIVAROXABAN 20 MG/1
TABLET, FILM COATED ORAL
Qty: 30 TABLET | Refills: 4 | Status: SHIPPED | OUTPATIENT
Start: 2020-04-01 | End: 2020-11-11 | Stop reason: SDUPTHER

## 2020-04-14 ENCOUNTER — TELEPHONE (OUTPATIENT)
Dept: CARDIOLOGY CLINIC | Age: 70
End: 2020-04-14

## 2020-04-14 RX ORDER — METOPROLOL SUCCINATE 50 MG/1
50 TABLET, EXTENDED RELEASE ORAL DAILY
Qty: 30 TABLET | Refills: 6 | Status: SHIPPED | OUTPATIENT
Start: 2020-04-14 | End: 2020-11-11 | Stop reason: SDUPTHER

## 2020-04-14 NOTE — TELEPHONE ENCOUNTER
Refill     metoprolol succinate (TOPROL XL) 50 MG extended release tablet     [facility] - [place], [place] - [address] - P [phone] - F [phone]    Pt has only 4 days left

## 2020-04-22 NOTE — PROGRESS NOTES
(TYLENOL) 500 MG tablet Take 1 tablet by mouth 4 times daily as needed for Pain Yes Otilia Lopez PA-C   dofetilide (TIKOSYN) 250 MCG capsule Take 1 capsule by mouth every 12 hours Yes Savannah Mora MD       Social History     Tobacco Use    Smoking status: Former Smoker     Packs/day: 1.50     Years: 30.00     Pack years: 45.00     Last attempt to quit: 1988     Years since quittin.3    Smokeless tobacco: Never Used   Substance Use Topics    Alcohol use: No     Alcohol/week: 0.0 standard drinks    Drug use: No      PHYSICAL EXAMINATION:  [ INSTRUCTIONS:  \"[x]\" Indicates a positive item  \"[]\" Indicates a negative item  -- DELETE ALL ITEMS NOT EXAMINED]  Vital Signs: (As obtained by patient/caregiver or practitioner observation)    Blood pressure- 141/69 Heart rate- 67   Respiratory rate-    Temperature-  Pulse oximetry-     Constitutional: [x] Appears well-developed and well-nourished [x] No apparent distress      [] Abnormal-   Mental status  [x] Alert and awake  [x] Oriented to person/place/time [x]Able to follow commands      Eyes:  EOM    [x]  Normal  [] Abnormal-  Sclera  [x]  Normal  [] Abnormal -         Discharge []  None visible  [] Abnormal -    HENT:   [x] Normocephalic, atraumatic.   [] Abnormal   [x] Mouth/Throat: Mucous membranes are moist.     External Ears [x] Normal  [] Abnormal-     Neck: [x] No visualized mass     Pulmonary/Chest: [x] Respiratory effort normal.  [x] No visualized signs of difficulty breathing or respiratory distress        [] Abnormal-      Musculoskeletal:   [x] Normal gait with no signs of ataxia         [x] Normal range of motion of neck        [] Abnormal-       Neurological:        [x] No Facial Asymmetry (Cranial nerve 7 motor function) (limited exam to video visit)          [x] No gaze palsy        [] Abnormal-         Skin:        [x] No significant exanthematous lesions or discoloration noted on facial skin         [] Abnormal-            Psychiatric: [x] Normal Affect [x] No Hallucinations        [] Abnormal-     Other pertinent observable physical exam findings-     ASSESSMENT/PLAN:  1. Persistent atrial fibrillation   01/14/2020  Mr. Jose Cruz Munoz is a pleasant 68year old male with a medical history significant for persistent symptomatic atrial fibrillation on sotalol, morbid obesity, and obstructive sleep apnea who presents with symptomatic atrial fibrillation with shortness of breath and dyspnea on exertion. He is compliant with his anticoagulation and sotalol however continues to suffer from symptomatic atrial fibrillation. He has undergone multiple cardioversions with improved symptoms on 08/2018 and 06/2019We discussed atrial fibrillation ablation, other antiarrhythmic therapy (dofetilide), and pacemaker. He has a brother that underwent afib ablation and is on dofetilide. We also discussed gastric bypass. He would like to proceed with switch to dofetilide and an atrial fibrillation ablation. We will arrange.     From stroke perspective he is at low to intermediate risk for stroke given [id number] score of 1 for age. 01/20/2020  Patient underwent Tikosyn admission followed by atrial fibrillation ablation. His postoperative course was complicated by atypical chest pain. 04/28/2020  Patient is now 3 months post atrial fibrillation ablation. He has had no more symptoms or recurrence of his atrial fibrillation as far as we can tell. Like to cut down on his medications if he can and so we will hold his Tikosyn. We will send her for 2-day monitor after he stops his Tikosyn. We will follow-up the results of his monitor and if no atrial fibrillation we will plan to follow-up with him in 6 months. If there is atrial fibrillation would consider putting him back on dofetilide plus or minus with a cardioversion. All questions and concerns addressed.      2. Obesity              ~Body mass index is 45.24 kg/m².    3. SAM              ~intolerant to margie Roy is a 68 y.o. male being evaluated by a Virtual Visit (video visit) encounter to address concerns as mentioned above. A caregiver was present when appropriate. Due to this being a TeleHealth encounter (During DPYLR-52 public health emergency), evaluation of the following organ systems was limited: Vitals/Constitutional/EENT/Resp/CV/GI//MS/Neuro/Skin/Heme-Lymph-Imm. Pursuant to the emergency declaration under the [address] [place] [address] and the [employer] Act, this Virtual Visit was conducted with patient's (and/or legal guardian's) consent, to reduce the patient's risk of exposure to COVID-19 and provide necessary medical care. The patient (and/or legal guardian) has also been advised to contact this office for worsening conditions or problems, and seek emergency medical treatment and/or call 911 if deemed necessary. Services were provided through a video synchronous discussion virtually to substitute for in-person clinic visit. Patient and provider were located at their individual homes. This note was scribed in the presence of Madie Guerra MD by Melanee Kawasaki, RN.     --Melanee Kawasaki, RN on 4/28/2020 at 11:40 AM    An electronic signature was used to authenticate this note. The scribe's documentation has been prepared under my direction and personally reviewed by me in its entirety. I confirm that the note above accurately reflects all work, physical examination, the discussion of treatments and procedures, and medical decision making performed by me. Naman Valladares MD personally performed the services described in this documentation as scribed by nurse in my presence, and is both accurate and complete.     Electronically signed by Marlou Kehr, MD on 4/28/2020 at 3:53 PM

## 2020-04-28 ENCOUNTER — TELEPHONE (OUTPATIENT)
Dept: CARDIOLOGY CLINIC | Age: 70
End: 2020-04-28

## 2020-04-28 ENCOUNTER — VIRTUAL VISIT (OUTPATIENT)
Dept: CARDIOLOGY CLINIC | Age: 70
End: 2020-04-28
Payer: COMMERCIAL

## 2020-04-28 VITALS
DIASTOLIC BLOOD PRESSURE: 69 MMHG | SYSTOLIC BLOOD PRESSURE: 141 MMHG | HEART RATE: 67 BPM | WEIGHT: 315 LBS | HEIGHT: 71 IN | BODY MASS INDEX: 44.1 KG/M2

## 2020-04-28 PROCEDURE — 99213 OFFICE O/P EST LOW 20 MIN: CPT | Performed by: INTERNAL MEDICINE

## 2020-04-28 RX ORDER — HYDROCODONE BITARTRATE AND ACETAMINOPHEN 7.5; 325 MG/1; MG/1
1 TABLET ORAL EVERY 6 HOURS PRN
COMMUNITY
End: 2020-05-19 | Stop reason: SDUPTHER

## 2020-05-03 PROCEDURE — 0296T PR EXT ECG > 48HR TO 21 DAY RCRD W/CONECT INTL RCRD: CPT | Performed by: INTERNAL MEDICINE

## 2020-05-19 RX ORDER — HYDROCODONE BITARTRATE AND ACETAMINOPHEN 7.5; 325 MG/1; MG/1
1 TABLET ORAL EVERY 6 HOURS PRN
Qty: 30 TABLET | Refills: 0 | Status: SHIPPED | OUTPATIENT
Start: 2020-05-19 | End: 2020-06-18

## 2020-05-20 ENCOUNTER — TELEPHONE (OUTPATIENT)
Dept: CARDIOLOGY CLINIC | Age: 70
End: 2020-05-20

## 2020-05-21 ENCOUNTER — OFFICE VISIT (OUTPATIENT)
Dept: ORTHOPEDIC SURGERY | Age: 70
End: 2020-05-21
Payer: COMMERCIAL

## 2020-05-21 VITALS — HEIGHT: 71 IN | BODY MASS INDEX: 44.1 KG/M2 | WEIGHT: 315 LBS

## 2020-05-21 PROCEDURE — 99203 OFFICE O/P NEW LOW 30 MIN: CPT | Performed by: PHYSICIAN ASSISTANT

## 2020-05-21 NOTE — PROGRESS NOTES
New Patient: SPINE    CHIEF COMPLAINT:    Chief Complaint   Patient presents with    Back Pain       HISTORY OF PRESENT ILLNESS:                The patient is a 68 y.o. male history of A. fib, last seen 2015 here for chronic aching low back/buttock pain at times extending into the posterior thighs. Pain is increased with any walking standing or prolonged sitting. Some relief with resting. States these are the same symptoms previously amendable to [facility] in 2015. Other conservative care includes Tylenol, Norco sparingly from PCP. Denies any new or progressive numbness tingling weakness. No recent bowel or bladder changes. No recent fever chills or infections. No recent trauma  Past Medical History:   Diagnosis Date    Arthritis     Atrial [address] [address]     Hiatal hernia     Hypertrophy of prostate without urinary obstruction and other lower urinary tract symptoms (LUTS)     Kidney stone     2001    Neoplasm of uncertain behavior of skin     Obesity, unspecified     Organic sleep apnea, unspecified       Pain Assessment  Location of Pain: Back  Severity of Pain: 1  Quality of Pain: Sharp, Dull, Aching  Duration of Pain: Persistent  Frequency of Pain: Constant  Aggravating Factors: Stairs, Walking, Standing, Squatting, Kneeling, Exercise, Straightening, Stretching, Bending  Limiting Behavior: Yes  Relieving Factors: Rest  Result of Injury: No  Work-Related Injury: No  Are there other pain locations you wish to document?: No    The pain assessment was noted & reviewed in the medical record today.      Current/Past Treatment:   · Physical Therapy: Yes  · Chiropractic:   Yes  · Injection:   9/29/15: Adrien L3-4 TX GRACIA --70% relief  Medications:            NSAIDS:             Muscle relaxer:              Steriods:              Neuropathic medications:              Opioids: Norco sparingly with PCP            Other:   · Surgery/Consult:    Work Status/Functionality: Retired    Past Medical History: Medical history form was reviewed today & scanned into the media tab  Past Medical History:   Diagnosis Date    Arthritis     Atrial fibrillation ([address]     Hiatal hernia     Hypertrophy of prostate without urinary obstruction and other lower urinary tract symptoms (LUTS)     Kidney stone     2001    Neoplasm of uncertain behavior of skin     Obesity, unspecified     Organic sleep apnea, unspecified       Past Surgical History:     Past Surgical History:   Procedure Laterality Date    ABLATION OF DYSRHYTHMIC FOCUS      COLONOSCOPY  04/01/2010    HERNIA REPAIR      umbilical 7381    LITHOTRIPSY Right 09/2016    OTHER SURGICAL HISTORY Right 09/25/2016    CYSTOSCOPY, RIGHT URETEROSCOPY, HOLMIUM LASER LITHOTRIPSY, STONE MANIPULATION AND EXTRACTION    OTHER SURGICAL HISTORY  10/12/2016    cystoscopy right stent removal    TONSILLECTOMY       Current Medications:     Current Outpatient Medications:     HYDROcodone-acetaminophen (NORCO) 7.5-325 MG per tablet, Take 1 tablet by mouth every 6 hours as needed for Pain for up to 30 days. , Disp: 30 tablet, Rfl: 0    metoprolol succinate (TOPROL XL) 50 MG extended release tablet, Take 1 tablet by mouth daily, Disp: 30 tablet, Rfl: 6    XARELTO 20 MG TABS tablet, TAKE ONE TABLET BY MOUTH DAILY WITH BREAKFAST, Disp: 30 tablet, Rfl: 4    acetaminophen (TYLENOL) 500 MG tablet, Take 1 tablet by mouth 4 times daily as needed for Pain, Disp: 120 tablet, Rfl: 0    dofetilide (TIKOSYN) 250 MCG capsule, Take 1 capsule by mouth every 12 hours, Disp: 60 capsule, Rfl: 3  Allergies:  Bee venom and Adhesive tape  Social History:    reports that he quit smoking about 32 years ago. He has a 45.00 pack-year smoking history. He has never used smokeless tobacco. He reports that he does not drink alcohol or use drugs.   Family History:   Family History   Problem Relation Age of Onset    Cancer Mother         breast met to bone    Emphysema Father     Cancer Maternal Uncle     Cancer Maternal Grandmother     Heart Failure Maternal Grandfather     Asthma Neg Hx     Diabetes Neg Hx     Hypertension Neg Hx        REVIEW OF SYSTEMS: Full ROS noted & scanned   CONSTITUTIONAL: Denies unexplained weight loss, fevers, chills or fatigue  NEUROLOGICAL: Denies unsteady gait or progressive weakness  MUSCULOSKELETAL: Denies joint swelling or redness  PSYCHOLOGICAL: Denies anxiety, depression   SKIN: Denies skin changes, delayed healing, rash, itching   HEMATOLOGIC: admits easy bleeding or bruising  ENDOCRINE: Denies excessive thirst, urination, heat/cold  RESPIRATORY: Denies current dyspnea, cough  GI: Denies nausea, vomiting, diarrhea   : Denies bowel or bladder issues       PHYSICAL EXAM:    Vitals: Height 5' 10.98\" (1.803 m), weight (!) 318 lb 5.5 oz (144.4 kg). GENERAL EXAM:  · General Apparence: Patient is adequately groomed with no evidence of malnutrition. · Orientation: The patient is oriented to time, place and person. · Mood & Affect:The patient's mood and affect are appropriate   · Lymphatic: The lymphatic examination bilaterally reveals all areas to be without enlargement or induration  · Sensation: Sensation is intact without deficit    LUMBAR/SACRAL EXAMINATION:  · Inspection: Local inspection shows no step-off or bruising. Mild kyphosis     · Palpation: No focal tenderness at midline. No evidence of tenderness at the midline. No tenderness bilaterally at the paraspinal or trochanters. There is no step-off or paraspinal spasm. · Range of Motion: Moderate loss flexion extension  · Strength:   Strength testing is 5/5 in all muscle groups tested. · Special Tests:   Straight leg raise and crossed SLR negative. Leg length and pelvis level.  0 out of 5 Lynsey's signs. · Skin: There are no rashes, ulcerations or lesions. · Reflexes: Reflexes are symmetrically trace at the patellar and ankle tendons. Clonus absent bilaterally at the feet. · Gait & station:  Forward

## 2020-05-22 ENCOUNTER — TELEPHONE (OUTPATIENT)
Dept: ORTHOPEDIC SURGERY | Age: 70
End: 2020-05-22

## 2020-05-22 NOTE — TELEPHONE ENCOUNTER
I called the patient and let him know his MRI was approved and he will call [facility] and set up scan and then follow up to review results. The order is in [employer].

## 2020-05-28 ENCOUNTER — HOSPITAL ENCOUNTER (OUTPATIENT)
Dept: MRI IMAGING | Age: 70
Discharge: HOME | End: 2020-05-28
Payer: COMMERCIAL

## 2020-05-28 PROCEDURE — 72148 MRI LUMBAR SPINE W/O DYE: CPT

## 2020-06-09 ENCOUNTER — TELEPHONE (OUTPATIENT)
Dept: CARDIOLOGY CLINIC | Age: 70
End: 2020-06-09

## 2020-06-09 ENCOUNTER — VIRTUAL VISIT (OUTPATIENT)
Dept: ORTHOPEDIC SURGERY | Age: 70
End: 2020-06-09
Payer: COMMERCIAL

## 2020-06-09 PROCEDURE — 99213 OFFICE O/P EST LOW 20 MIN: CPT | Performed by: PHYSICIAN ASSISTANT

## 2020-06-09 NOTE — PROGRESS NOTES
ONE TABLET BY MOUTH DAILY WITH BREAKFAST, Disp: 30 tablet, Rfl: 4    acetaminophen (TYLENOL) 500 MG tablet, Take 1 tablet by mouth 4 times daily as needed for Pain, Disp: 120 tablet, Rfl: 0    dofetilide (TIKOSYN) 250 MCG capsule, Take 1 capsule by mouth every 12 hours, Disp: 60 capsule, Rfl: 3  Allergies:  Bee venom and Adhesive tape  Social History:    reports that he quit smoking about 32 years ago. He has a 45.00 pack-year smoking history. He has never used smokeless tobacco. He reports that he does not drink alcohol or use drugs. Family History:   Family History   Problem Relation Age of Onset    Cancer Mother         breast met to bone    Emphysema Father     Cancer Maternal Uncle     Cancer Maternal Grandmother     Heart Failure Maternal Grandfather     Asthma Neg Hx     Diabetes Neg Hx     Hypertension Neg Hx        REVIEW OF SYSTEMS:   Patient's review of symptoms was reviewed and is significant for back pain and negative for recent unexplained weight loss, fatigue, current fever/chills, bowel or bladder dysfunction, chest pain, or shortness of breath. All other ROS were negative. PHYSICAL EXAM--limited to visual exam only  Vitals: There were no vitals taken for this visit. GENERAL EXAM:  · General Apparence: Patient is adequately groomed with no evidence of malnutrition. · Orientation: The patient is oriented to time, place and person. · Mood & Affect:The patient's mood and affect are appropriate       LUMBAR/SACRAL EXAMINATION:  · Inspection: mild kyphosis  · Range of Motion:  45 degrees flexion, 10 degrees extension   · Gait & station: forward flexed unassisted  · Additional Examinations:   · RIGHT LOWER EXTREMITY: Inspection/examination of the right lower extremity does not show any deformity or injury. Range of motion is full. There is no gross instability. · LEFT LOWER EXTREMITY:  Inspection/examination of the left lower extremity does not show any deformity or injury.  Range of

## 2020-06-09 NOTE — TELEPHONE ENCOUNTER
Holding anticoagulation increases risk for stroke however ok to hold perioperatively. From perioperative standpoint patient's risk for MACE is 0.1% placing him at low risk for intermediate risk procedure. I'm happy to sign drafted letter. Thanks.

## 2020-06-11 PROCEDURE — 0298T PR EXT ECG > 48HR TO 21 DAY REVIEW AND INTERPRETATN: CPT | Performed by: INTERNAL MEDICINE

## 2020-06-12 ENCOUNTER — TELEPHONE (OUTPATIENT)
Dept: ORTHOPEDIC SURGERY | Age: 70
End: 2020-06-12

## 2020-06-12 NOTE — TELEPHONE ENCOUNTER
DOS   06/16/2020  CPT   56[address]  DX   M54.16   M51.26  M48.062  OP SX AUTH  NPR    Bilateral  LEVELS   L3  -  L4   PROCEDURE   Trans foraminal GRACIA  DR.  Jt  [facility]  [place]  INSURANCE:   Conrad Curtis

## 2020-06-16 ENCOUNTER — HOSPITAL ENCOUNTER (OUTPATIENT)
Age: 70
Setting detail: OUTPATIENT SURGERY
Discharge: HOME | End: 2020-06-16
Attending: PHYSICAL MEDICINE & REHABILITATION | Admitting: PHYSICAL MEDICINE & REHABILITATION
Payer: COMMERCIAL

## 2020-06-16 VITALS
BODY MASS INDEX: 44.1 KG/M2 | SYSTOLIC BLOOD PRESSURE: 145 MMHG | DIASTOLIC BLOOD PRESSURE: 71 MMHG | HEIGHT: 71 IN | OXYGEN SATURATION: 99 % | HEART RATE: 68 BPM | WEIGHT: 315 LBS | TEMPERATURE: 97.5 F | RESPIRATION RATE: 12 BRPM

## 2020-06-16 PROCEDURE — 2500000003 HC RX 250 WO HCPCS: Performed by: PHYSICAL MEDICINE & REHABILITATION

## 2020-06-16 PROCEDURE — 2580000003 HC RX 258: Performed by: PHYSICAL MEDICINE & REHABILITATION

## 2020-06-16 PROCEDURE — 7100000010 HC PHASE II RECOVERY - FIRST 15 MIN: Performed by: PHYSICAL MEDICINE & REHABILITATION

## 2020-06-16 PROCEDURE — 3600000012 HC SURGERY LEVEL 2 ADDTL 15MIN: Performed by: PHYSICAL MEDICINE & REHABILITATION

## 2020-06-16 PROCEDURE — 6360000004 HC RX CONTRAST MEDICATION: Performed by: PHYSICAL MEDICINE & REHABILITATION

## 2020-06-16 PROCEDURE — 3600000002 HC SURGERY LEVEL 2 BASE: Performed by: PHYSICAL MEDICINE & REHABILITATION

## 2020-06-16 PROCEDURE — 99152 MOD SED SAME PHYS/QHP 5/>YRS: CPT | Performed by: PHYSICAL MEDICINE & REHABILITATION

## 2020-06-16 PROCEDURE — 6360000002 HC RX W HCPCS: Performed by: PHYSICAL MEDICINE & REHABILITATION

## 2020-06-16 PROCEDURE — 7100000011 HC PHASE II RECOVERY - ADDTL 15 MIN: Performed by: PHYSICAL MEDICINE & REHABILITATION

## 2020-06-16 PROCEDURE — 2709999900 HC NON-CHARGEABLE SUPPLY: Performed by: PHYSICAL MEDICINE & REHABILITATION

## 2020-06-16 RX ORDER — DEXAMETHASONE SODIUM PHOSPHATE 10 MG/ML
INJECTION, SOLUTION INTRAMUSCULAR; INTRAVENOUS PRN
Status: DISCONTINUED | OUTPATIENT
Start: 2020-06-16 | End: 2020-06-16 | Stop reason: ALTCHOICE

## 2020-06-16 RX ORDER — SODIUM CHLORIDE 9 MG/ML
INJECTION INTRAVENOUS
Status: DISCONTINUED
Start: 2020-06-16 | End: 2020-06-16 | Stop reason: HOSPADM

## 2020-06-16 RX ORDER — SODIUM CHLORIDE 9 MG/ML
INJECTION INTRAVENOUS PRN
Status: DISCONTINUED | OUTPATIENT
Start: 2020-06-16 | End: 2020-06-16 | Stop reason: ALTCHOICE

## 2020-06-16 RX ORDER — DEXAMETHASONE SODIUM PHOSPHATE 10 MG/ML
INJECTION, SOLUTION INTRAMUSCULAR; INTRAVENOUS
Status: DISCONTINUED
Start: 2020-06-16 | End: 2020-06-16 | Stop reason: HOSPADM

## 2020-06-16 RX ORDER — MIDAZOLAM HYDROCHLORIDE 1 MG/ML
INJECTION INTRAMUSCULAR; INTRAVENOUS
Status: DISCONTINUED
Start: 2020-06-16 | End: 2020-06-16 | Stop reason: HOSPADM

## 2020-06-16 RX ORDER — MIDAZOLAM HYDROCHLORIDE 1 MG/ML
INJECTION INTRAMUSCULAR; INTRAVENOUS PRN
Status: DISCONTINUED | OUTPATIENT
Start: 2020-06-16 | End: 2020-06-16 | Stop reason: ALTCHOICE

## 2020-06-16 RX ORDER — SODIUM CHLORIDE, SODIUM LACTATE, POTASSIUM CHLORIDE, CALCIUM CHLORIDE 600; 310; 30; 20 MG/100ML; MG/100ML; MG/100ML; MG/100ML
INJECTION, SOLUTION INTRAVENOUS CONTINUOUS
Status: DISCONTINUED | OUTPATIENT
Start: 2020-06-16 | End: 2020-06-16 | Stop reason: HOSPADM

## 2020-06-16 RX ADMIN — LIDOCAINE HYDROCHLORIDE 0.1 ML: 10 INJECTION, SOLUTION EPIDURAL; INFILTRATION; INTRACAUDAL; PERINEURAL at 13:00

## 2020-06-16 RX ADMIN — SODIUM CHLORIDE, POTASSIUM CHLORIDE, SODIUM LACTATE AND CALCIUM CHLORIDE: 600; 310; 30; 20 INJECTION, SOLUTION INTRAVENOUS at 13:01

## 2020-06-16 ASSESSMENT — PAIN DESCRIPTION - DESCRIPTORS: DESCRIPTORS: SHARP;CONSTANT;ACHING

## 2020-06-16 ASSESSMENT — PAIN - FUNCTIONAL ASSESSMENT
PAIN_FUNCTIONAL_ASSESSMENT: 0-10
PAIN_FUNCTIONAL_ASSESSMENT: PREVENTS OR INTERFERES SOME ACTIVE ACTIVITIES AND ADLS

## 2020-06-16 NOTE — OP NOTE
Patient:  Soledad   Record #:  [id number]   Date:  6/16/2020  Physician:  Glaids Sotelo M.D. Facility: [facility]       Pre-op diagnosis: Lumbar radiculitis, lumbar spondylosis   Post-op diagnosis:  same  Procedure: Bilateral L3-4 transforaminal epidural injection with flouroscopic guidance #1 new series    Ansethesia: 2mg IV versed    Procedure Note:    The patient was admitted through pre-op and written consent was obtained. The patient was advised of the risks and benefits of the procedure, including but not limited to the following: bleeding, pain, infection, temporary paralysis, nerve damage and spinal headache. The patient was given the opportunity to ask questions. There were no contraindications for this procedure. The appropriate area was prepped and draped in a sterile fashion. Landmarks were identified and marked. A 23G spinal needle was advanced to the right L3 neural foramen using fluoroscopic guidance with ideal needle tip placement confirmed by multiple views. Injection of contrast showed epidural flow. There were no signs of intravascular or intrathecal injection. 5mg Dexamethasone and 1mL lidocaine were then injected per site. There were no complications and the patient tolerated the procedure well. The patient was transferred to the recovery area and monitored. Discharge instructions were given. The patient is to contact me for any post-procedure concerns. The patient is to follow up as scheduled. The same procedure was performed on the left side.     Estimated blood loss: none    ANTOINETTE Nielsen MD

## 2020-06-16 NOTE — PROGRESS NOTES
Dr. Azra Miramontes states to offer patient IV sedation. Patient states he would like IV. IV orders put in and IV placed in pre op dept.

## 2020-06-16 NOTE — PROGRESS NOTES
Discharge instructions given to pts wife and verbalized understanding, states pain is at a tolerable level, no numbness or weakness noted, pt wheeled out to car without complications

## 2020-06-17 ENCOUNTER — TELEPHONE (OUTPATIENT)
Dept: CARDIOLOGY CLINIC | Age: 70
End: 2020-06-17

## 2020-06-29 ENCOUNTER — TELEPHONE (OUTPATIENT)
Dept: ORTHOPEDIC SURGERY | Age: 70
End: 2020-06-29

## 2020-06-30 ENCOUNTER — VIRTUAL VISIT (OUTPATIENT)
Dept: ORTHOPEDIC SURGERY | Age: 70
End: 2020-06-30
Payer: COMMERCIAL

## 2020-06-30 PROCEDURE — 99213 OFFICE O/P EST LOW 20 MIN: CPT | Performed by: PHYSICIAN ASSISTANT

## 2020-06-30 NOTE — LETTER
[facility]  Surgery Precert & Billing Form:    DEMOGRAPHICS:                                                                                                       Patient Name:  Crystal Wyman  Patient :  1950   Patient SS#:      Patient Phone:  [phone] (home)  Alt.  Patient Phone:    Patient Address:  [address] [place] [address],[address] [address]    PCP:  Francesca Miranda MD  Insurance: [employer]    DIAGNOSIS & PROCEDURE:                                                                                      Diagnosis: M54.16, M51.26, M48.062  Operation: right L3-4 TX GRACIA #2    SURGERY  INFORMATION  Date of Surgery:   2020  Location:   [facility]  Type:    OUTPATIENT  23 hour hold:  NO  Surgeon:          Domo Luz MD  20     BILLING INFORMATION:                                                                                                Physician Procedure                                            CPT Codes        RIGHT L3-4 TRANSFORAMINAL GRACIA #2  32941                  PA, or Fellow Procedure                                      CPT Codes

## 2020-06-30 NOTE — LETTER
[facility]    Please Schedule the following with: Dr. Murry    Date:  6/30/20     Patient: Luana Phillips     YOB: 1950    Patient Home Phone: [phone] (home)    Diagnosis: lumbar radiculitis M54.16, disc bulging  M51.26 and foraminal stenosis L3-4 M48.062    []LT     [x]RT     []BUTCH     []Midline    Levels: L3-4 #2    []Cervical GRACIA 48666, 87669  []L-MBB 76736, 50277  []SI Joint 51617   []C-FACET 55675, 79798, 13311  []L-FACET [id number], 80481  []Interlaminar GRACIA 83482     []HIP 40157    []C-MBB  [x]Transforaminal GRACIA 23381  []Neurotomy 58053, 29728, 88272    Attending Physician: Marifer Gaines    Injection Schedule for: At: [facility]    First Insurance:[employer]                                    Pre-cert #:  Second Insurance:                 Pre-cert #:    Comments:  Bilateral L3-4 TX GRACIA 6/16/2020 80% x 2 weeks    SEDATION:       [] IV           [] ORAL    [x] Blood Thinner: Xarelto               []Diabetic           []Antibiotic:               []Glaucoma:    [] Pacemaker/defib       [] Current Open Wounds, Lacerations or Sores     Allergies:    Allergies   Allergen Reactions    Bee Venom     Adhesive Tape Rash       Past Medical History:   Diagnosis Date    Arthritis     Atrial fibrillation ([address]     Hiatal hernia     Hypertrophy of prostate without urinary obstruction and other lower urinary tract symptoms (LUTS)     Kidney stone     2001    Neoplasm of uncertain behavior of skin     Obesity, unspecified     Organic sleep apnea, unspecified         Current Outpatient Medications   Medication Sig Dispense Refill    metoprolol succinate (TOPROL XL) 50 MG extended release tablet Take 1 tablet by mouth daily 30 tablet 6    XARELTO 20 MG TABS tablet TAKE ONE TABLET BY MOUTH DAILY WITH BREAKFAST 30 tablet 4    acetaminophen (TYLENOL) 500 MG tablet Take 1 tablet by mouth 4 times daily as needed for Pain 120 tablet 0

## 2020-06-30 NOTE — PROGRESS NOTES
Virtual Visit: PM&R SPINE    CHIEF COMPLAINT:  Back pain    The patient is being evaluated by a Virtual Visit (video visit) encounter due to the restrictions of the COVID-19 pandemic. A caregiver was present when appropriate. All issues as below were discussed and addressed, but no physical exam was performed unless allowed by visual confirmation. If it was felt that patient should be evaluated in clinic then they were directed there. Due to this being a TeleHealth encounter ([facility] emergency), evaluation of the following organ systems was limited to: spine. Pursuant to the emergency declaration under the [facility], [facility] waiver authority and the [employer] Act, this Virtual Visit was conducted with patient's verbal consent, to reduce the risk of exposure to COVID-19 and provide necessary medical care. The patient (and/or legal guardian) has also been advised to contact this office for worsening conditions or problems, and seek emergency medical treatment and/or call 911 if deemed necessary. Services were provided through a video synchronous discussion virtually to substitute for in-person encounter. Individuals present during telemedicine consultation-Mariana Mclean, [facility]    HISTORY OF PRESENT ILLNESS:                The patient is a 68 y.o. male virtual visit follow up after B L3-4 TX GRACIA from 6- for chronic aching low back/buttock pain at times extending into the posterior thighs. Pain is increased with any walking standing or prolonged sitting. Some relief with resting. He initially felt 80% improvement the first 2 weeks following the injection but feels symptoms have returned after being active on Friday. He currently reports aching right buttock pain extending into the right anterior medial thigh to the knee. His symptoms are increased with walking or standing.   Very intermittently will have some right groin discomfort. Other conservative care includes Tylenol, Norco sparingly from PCP. Denies any new or progressive numbness tingling weakness. No recent bowel or bladder changes. No recent fever chills or infections. No recent trauma.   No side effects of the procedure.     Current/Past Treatment:   · Physical Therapy: Yes  · Chiropractic:   Yes  · Injection:     ·   · 9/29/15: Adrien L3-4 TX GRACIA --70% relief  6/16/2020 Bilateral L3-4 transforaminal epidural injection--80% x2wks  Medications:            NSAIDS:             Muscle relaxer:              Steriods:              Neuropathic medications:              Opioids: Norco sparingly with PCP            Other:   · Surgery/Consult:     Work Status/Functionality: Retired      Past Medical History:   Diagnosis Date    Arthritis     Atrial fibrillation ([address]     Hiatal hernia     Hypertrophy of prostate without urinary obstruction and other lower urinary tract symptoms (LUTS)     Kidney stone     2001    Neoplasm of uncertain behavior of skin     Obesity, unspecified     Organic sleep apnea, unspecified      Past Medical History: Medical history form was reviewed today & scanned into the media tab  Past Medical History:   Diagnosis Date    Arthritis     Atrial fibrillation ([address]     Hiatal hernia     Hypertrophy of prostate without urinary obstruction and other lower urinary tract symptoms (LUTS)     Kidney stone     2001    Neoplasm of uncertain behavior of skin     Obesity, unspecified     Organic sleep apnea, unspecified       Past Surgical History:     Past Surgical History:   Procedure Laterality Date    ABLATION OF DYSRHYTHMIC FOCUS      COLONOSCOPY  04/01/2010    HERNIA REPAIR      umbilical 3186    LITHOTRIPSY Right 09/2016    OTHER SURGICAL HISTORY Right 09/25/2016    CYSTOSCOPY, RIGHT URETEROSCOPY, HOLMIUM LASER LITHOTRIPSY, STONE MANIPULATION AND EXTRACTION    OTHER SURGICAL HISTORY  10/12/2016 cystoscopy right stent removal    PAIN MANAGEMENT PROCEDURE Bilateral 6/16/2020    BILATERAL LUMBAR THREE FOUR EPIDURAL STEROID INJECTION SITE CONFIRMED BY FLUOROSCOPY performed by Mabel Avilez MD at [facility]       Current Medications:     Current Outpatient Medications:     metoprolol succinate (TOPROL XL) 50 MG extended release tablet, Take 1 tablet by mouth daily, Disp: 30 tablet, Rfl: 6    XARELTO 20 MG TABS tablet, TAKE ONE TABLET BY MOUTH DAILY WITH BREAKFAST, Disp: 30 tablet, Rfl: 4    acetaminophen (TYLENOL) 500 MG tablet, Take 1 tablet by mouth 4 times daily as needed for Pain, Disp: 120 tablet, Rfl: 0  Allergies:  Bee venom and Adhesive tape  Social History:    reports that he quit smoking about 32 years ago. He has a 45.00 pack-year smoking history. He has never used smokeless tobacco. He reports that he does not drink alcohol or use drugs. Family History:   Family History   Problem Relation Age of Onset    Cancer Mother         breast met to bone    Emphysema Father     Cancer Maternal Uncle     Cancer Maternal Grandmother     Heart Failure Maternal Grandfather     Asthma Neg Hx     Diabetes Neg Hx     Hypertension Neg Hx        REVIEW OF SYSTEMS:   Patient's review of symptoms was reviewed and is significant for back pain and negative for recent unexplained weight loss, fatigue, current fever/chills, bowel or bladder dysfunction, chest pain, or shortness of breath. All other ROS were negative. PHYSICAL EXAM--limited to visual exam only  Vitals: There were no vitals taken for this visit. GENERAL EXAM:  · General Apparence: Patient is adequately groomed with no evidence of malnutrition. · Orientation: The patient is oriented to time, place and person.    · Mood & Affect:The patient's mood and affect are appropriate       Range of motion: Able to sit forward flex without pain    Diagnostic Testing:    Updated lumbar MRI scan report independently reviewed May 28, 2020 showing disc bulging L3-4 with at least moderate bilateral foraminal stenosis    2 views lumbar spine 5/21/2020 L3-4 retrolisthesis with moderate to severe DDD, moderate to severe DDD L5-S1, multilevel DDD/spondylosis and facet arthropathy. Left lateral spur L2-3, multilevel anterior spurring. Hips show no significant OA     Lumbar MRI 2015 showed multilevel lumbar spondylosis and facet arthropathy with mild central and lateral recess stenosis L3-4     AP pelvis 9/10/2015: R>L SI sclerosis, w/out fusion      One view thoracic spine 7/23/2015: Marker placed right of midline T12 level, multilevel thoracic spondylosis w/diffuse flowing osteophytosis            Impression:  1) Chronic LBP, right radiculitis, neurogenic claudication   2) Moderate foraminal stenosis L3-4  3) Scoliosis, spondylosis, probable DISH  4) H/o GRACIA 2015 w/relief   5) Afib--Xarelto tx       Plan:   1) We had a long discussion. He wishes to proceed with right L3-4 TX GRACIA #2. Procedure risk and benefits were discussed. 2) F/u in office after above        Time spent during this visit - 15min      In the middle of our visit he accidentally turned off his camera and was unable to turn it back on thus the end of this visit was done via telephone.       Owne Rodriguez [facility]

## 2020-06-30 NOTE — PROGRESS NOTES
uncertain behavior of skin, Obesity, unspecified, and Organic sleep apnea, unspecified. Surgical History:   has a past surgical history that includes hernia repair; Tonsillectomy; Colonoscopy (04/01/2010); Lithotripsy (Right, 09/2016); other surgical history (Right, 09/25/2016); other surgical history (10/12/2016); ablation of dysrhythmic focus; and Pain management procedure (Bilateral, 6/16/2020). Social History:   reports that he quit smoking about 32 years ago. He has a 45.00 pack-year smoking history. He has never used smokeless tobacco. He reports that he does not drink alcohol or use drugs. Family History:  family history includes Cancer in his maternal grandmother, maternal uncle, and mother; Emphysema in his father; Heart Failure in his maternal grandfather. Home Medications:  Outpatient Encounter Medications as of 7/1/2020   Medication Sig Dispense Refill    HYDROcodone-acetaminophen (NORCO) 5-325 MG per tablet Take 1 tablet by mouth every 6 hours as needed for Pain.  metoprolol succinate (TOPROL XL) 50 MG extended release tablet Take 1 tablet by mouth daily 30 tablet 6    XARELTO 20 MG TABS tablet TAKE ONE TABLET BY MOUTH DAILY WITH BREAKFAST 30 tablet 4    acetaminophen (TYLENOL) 500 MG tablet Take 1 tablet by mouth 4 times daily as needed for Pain 120 tablet 0     No facility-administered encounter medications on file as of 7/1/2020. Allergies:  Bee venom and Adhesive tape     Review of Systems   Constitutional: Negative. HENT: Negative. Eyes: Negative. Respiratory: Negative. Cardiovascular: Negative. Gastrointestinal: Negative. Genitourinary: Negative. Musculoskeletal: Negative. Skin: Negative. Neurological: Negative. Hematological: Negative. Psychiatric/Behavioral: Negative. /70   Pulse 69   Ht 5' 11\" (1.803 m)   Wt (!) 320 lb (145.2 kg)   SpO2 98%   BMI 44.63 kg/m²     EKG today, personally reviewed.   SR 71    7/17/18 Echo  Summary   Low normal left ventricular systolic function with an estimated ejection   fraction of 50-55%.   Normal left ventricular diastolic filling pressure.   Right ventricular systolic function is mildly reduced to low normal.   The right ventricle is mildly enlarged.   The right atrium is mildly dilated.   Mild mitral regurgitation.   Mild aortic regurgitation.   Mild tricuspid regurgitation.   Normal systolic pulmonary artery pressure (SPAP) estimated at 36 mmHg (RA   pressure 8 mmHg).  Mild pulmonic regurgitation. Objective:  Physical Exam   Constitutional: He is oriented to person, place, and time. He appears well-developed and well-nourished. HENT:   Head: Normocephalic and atraumatic. Eyes: Pupils are equal, round, and reactive to light. Neck: Normal range of motion. Cardiovascular: Normal rate, irregular rhythm and normal heart sounds. Pulmonary/Chest: Effort normal and breath sounds normal.   Abdominal: Soft. No tenderness. Musculoskeletal: Normal range of motion. He exhibits no edema. Neurological: He is alert and oriented to person, place, and time. Skin: Skin is warm and dry. Psychiatric: He has a normal mood and affect. Assessment:  1. AF-persistent. No AF since the PVI 1/22/20  2. Brief PAT- no symptoms  3. Frequent PACs    Plan:  1. Continue with Xarelto for stroke prevention. 2.  Continue metoprolol  3. Follow up in 6 months  4. No restrictions with activity  5. Ok to hold Xarelto for 4 days prior to epidural back injections      This note was scribed in the presence of Saul Logan MD by Raulito Delvalle RN.     I, Dr. Saul Logan, personally performed the services described in this documentation as scribed by Raulito Delvalle RN  in my presence, and it is both accurate and complete.         Saul Logan MD

## 2020-07-01 ENCOUNTER — OFFICE VISIT (OUTPATIENT)
Dept: CARDIOLOGY CLINIC | Age: 70
End: 2020-07-01
Payer: COMMERCIAL

## 2020-07-01 VITALS
DIASTOLIC BLOOD PRESSURE: 70 MMHG | OXYGEN SATURATION: 98 % | WEIGHT: 315 LBS | HEART RATE: 69 BPM | HEIGHT: 71 IN | BODY MASS INDEX: 44.1 KG/M2 | SYSTOLIC BLOOD PRESSURE: 110 MMHG

## 2020-07-01 PROCEDURE — 93000 ELECTROCARDIOGRAM COMPLETE: CPT | Performed by: INTERNAL MEDICINE

## 2020-07-01 PROCEDURE — 99214 OFFICE O/P EST MOD 30 MIN: CPT | Performed by: INTERNAL MEDICINE

## 2020-07-01 RX ORDER — HYDROCODONE BITARTRATE AND ACETAMINOPHEN 5; 325 MG/1; MG/1
1 TABLET ORAL EVERY 6 HOURS PRN
COMMUNITY
End: 2020-07-21 | Stop reason: SDUPTHER

## 2020-07-01 NOTE — PATIENT INSTRUCTIONS
1.  Continue with Xarelto for stroke prevention. 2.  Continue metoprolol  3. Follow up in 6 months  4. No restrictions with activity  5.   Ok to hold Xarelto for 4 days prior to epidural back injections

## 2020-07-06 ENCOUNTER — TELEPHONE (OUTPATIENT)
Dept: FAMILY MEDICINE CLINIC | Age: 70
End: 2020-07-06

## 2020-07-10 ENCOUNTER — TELEPHONE (OUTPATIENT)
Dept: ORTHOPEDIC SURGERY | Age: 70
End: 2020-07-10

## 2020-07-10 NOTE — TELEPHONE ENCOUNTER
Auth: # NPR    Date: 07/14/2020  Type of SX:  OP  Location: [facility]  CPT: 25438   DX Code: M54.16   M51.26   M48.062  SX area: Right L3 - L4 transforaminal GRACIA  Insurance: [employer]

## 2020-07-14 ENCOUNTER — HOSPITAL ENCOUNTER (OUTPATIENT)
Age: 70
Setting detail: OUTPATIENT SURGERY
Discharge: HOME | End: 2020-07-14
Attending: PHYSICAL MEDICINE & REHABILITATION | Admitting: PHYSICAL MEDICINE & REHABILITATION
Payer: COMMERCIAL

## 2020-07-14 VITALS
HEIGHT: 71 IN | BODY MASS INDEX: 44.1 KG/M2 | RESPIRATION RATE: 16 BRPM | DIASTOLIC BLOOD PRESSURE: 81 MMHG | TEMPERATURE: 97.3 F | HEART RATE: 67 BPM | SYSTOLIC BLOOD PRESSURE: 137 MMHG | WEIGHT: 315 LBS | OXYGEN SATURATION: 98 %

## 2020-07-14 PROCEDURE — 3600000002 HC SURGERY LEVEL 2 BASE: Performed by: PHYSICAL MEDICINE & REHABILITATION

## 2020-07-14 PROCEDURE — 2709999900 HC NON-CHARGEABLE SUPPLY: Performed by: PHYSICAL MEDICINE & REHABILITATION

## 2020-07-14 PROCEDURE — 7100000010 HC PHASE II RECOVERY - FIRST 15 MIN: Performed by: PHYSICAL MEDICINE & REHABILITATION

## 2020-07-14 PROCEDURE — 2500000003 HC RX 250 WO HCPCS: Performed by: PHYSICAL MEDICINE & REHABILITATION

## 2020-07-14 PROCEDURE — 3600000012 HC SURGERY LEVEL 2 ADDTL 15MIN: Performed by: PHYSICAL MEDICINE & REHABILITATION

## 2020-07-14 PROCEDURE — 6360000002 HC RX W HCPCS: Performed by: PHYSICAL MEDICINE & REHABILITATION

## 2020-07-14 PROCEDURE — 6360000004 HC RX CONTRAST MEDICATION: Performed by: PHYSICAL MEDICINE & REHABILITATION

## 2020-07-14 RX ORDER — DEXAMETHASONE SODIUM PHOSPHATE 10 MG/ML
INJECTION, SOLUTION INTRAMUSCULAR; INTRAVENOUS
Status: DISCONTINUED
Start: 2020-07-14 | End: 2020-07-14 | Stop reason: HOSPADM

## 2020-07-14 RX ORDER — LIDOCAINE HYDROCHLORIDE 10 MG/ML
INJECTION, SOLUTION EPIDURAL; INFILTRATION; INTRACAUDAL; PERINEURAL PRN
Status: DISCONTINUED | OUTPATIENT
Start: 2020-07-14 | End: 2020-07-14 | Stop reason: ALTCHOICE

## 2020-07-14 ASSESSMENT — PAIN SCALES - GENERAL: PAINLEVEL_OUTOF10: 0

## 2020-07-14 NOTE — H&P
have been discussed with the patient and or family. The patient and or next of kin understands and agrees to proceed.     Дмитрий Sinclair M.D.

## 2020-07-16 NOTE — OP NOTE
Patient:  Andrés Clarke  Record #:  [id number]   Date:  7-  Physician:  Candelario Arce M.D. Facility: [facility]       Pre-op diagnosis: Lumbar radiculitis, lumbar spondylosis   Post-op diagnosis:  same  Procedure: Right L3-4 transforaminal epidural injection #2 with flouroscopic guidance    Procedure Note:    The patient was admitted through pre-op and written consent was obtained. The patient was advised of the risks and benefits of the procedure, including but not limited to the following: bleeding, pain, infection, temporary paralysis, nerve damage and spinal headache. The patient was given the opportunity to ask questions. There were no contraindications for this procedure. The appropriate area was prepped and draped in a sterile fashion. Landmarks were identified and marked. A 23G spinal needle was advanced to the right L3 neural foramen using fluoroscopic guidance with ideal needle tip placement confirmed by multiple views. Injection of contrast showed epidural flow. There were no signs of intravascular or intrathecal injection. 10 mg Dexamethasone and 2 mL lidocaine were then injected. There were no complications and the patient tolerated the procedure well. The patient was transferred to the recovery area and monitored. Discharge instructions were given. The patient is to contact me for any post-procedure concerns. The patient is to follow up as scheduled.     Estimated blood loss: none    F Codi Francisco MD

## 2020-07-21 RX ORDER — HYDROCODONE BITARTRATE AND ACETAMINOPHEN 5; 325 MG/1; MG/1
1 TABLET ORAL EVERY 6 HOURS PRN
Qty: 20 TABLET | Refills: 0 | Status: SHIPPED | OUTPATIENT
Start: 2020-07-21 | End: 2021-06-08 | Stop reason: SDUPTHER

## 2020-07-21 NOTE — TELEPHONE ENCOUNTER
Date of last refill of this med was 5/19/20, # of pills given 30 and # of refills given 0. Their next appointment is not scheduled, the last date patient was seen was 1/30/20. Does patient have medication agreement on file?  No

## 2020-07-30 ENCOUNTER — OFFICE VISIT (OUTPATIENT)
Dept: ORTHOPEDIC SURGERY | Age: 70
End: 2020-07-30
Payer: COMMERCIAL

## 2020-07-30 VITALS — WEIGHT: 315 LBS | BODY MASS INDEX: 44.1 KG/M2 | HEIGHT: 71 IN

## 2020-07-30 PROCEDURE — 99213 OFFICE O/P EST LOW 20 MIN: CPT | Performed by: PHYSICIAN ASSISTANT

## 2020-07-30 NOTE — PROGRESS NOTES
Follow up Injection: SPINE    CHIEF COMPLAINT:    Chief Complaint   Patient presents with    Back Pain     F/U GRACIA injection       HISTORY OF PRESENT ILLNESS:                The patient is a 65 y.o. male here to follow up after right L3-4 TX GRACIA #2 from 7/14/2020 for chronic aching low back/buttock pain at times extending into the posterior thighs. He was also experiencing some right medial thigh discomfort.  Pain was increased with any walking standing or prolonged sitting.  Some relief with resting. He currently reports 95% improvement of his symptoms since the injection with improved functionality. He still has some right buttock discomfort with twisting. No groin pain. Other conservative care includes Tylenol, Norco sparingly from PCP.  Denies any new or progressive numbness tingling weakness.  No recent bowel or bladder changes.  No recent fever chills or infections.  No recent trauma.   No side effects of the procedure.      Current/Past Treatment:   · Physical Therapy: Yes  · Chiropractic:   Yes  · Injection:     ·    · 9/29/15: Adrien L3-4 TX GRACIA --70% relief  6/16/2020 Bilateral L3-4 transforaminal epidural injection--80% x2wks  7/14/2020 Right L3-4 transforaminal epidural injection #2--95%  Medications:            NSAIDS:             Muscle relaxer:              Steriods:              Neuropathic medications:              Opioids: Norco sparingly with PCP            Other:   · Surgery/Consult:     Work Status/Functionality: Retired      Pain Assessment  Location of Pain: Back  Severity of Pain: 1  Quality of Pain: Sharp, Dull, Aching  Duration of Pain: Persistent  Frequency of Pain: Constant  Aggravating Factors: Stairs, Walking, Standing, Squatting, Kneeling, Exercise, Straightening, Stretching, Bending  Limiting Behavior: Yes  Relieving Factors: Rest  Result of Injury: No  Work-Related Injury: No  Are there other pain locations you wish to document?: No      The post injection form was reviewed & scanned into the medical record today. Post injection side Effects: 1. Headache: no              2.Cramping:  no    3. Fever/Chills: no            4. Other: no    Past Medical History: Medical history form was reviewed & scanned into the chart until Media tab  Past Medical History:   Diagnosis Date    Arthritis     Atrial fibrillation ([address]     Hiatal hernia     Hypertrophy of prostate without urinary obstruction and other lower urinary tract symptoms (LUTS)     Kidney stone     2001    Neoplasm of uncertain behavior of skin     Obesity, unspecified     Organic sleep apnea, unspecified         REVIEW OF SYSTEMS:   CONSTITUTIONAL: Denies unexplained weight loss, fevers, chills or fatigue  NEUROLOGIC: Denies tremors or seizures         PHYSICAL EXAM:    Vitals: Height 5' 10.98\" (1.803 m), weight (!) 320 lb 1.7 oz (145.2 kg). GENERAL EXAM:  · General Apparence: Patient is adequately groomed with no evidence of malnutrition. · Orientation: The patient is oriented to time, place and person. · Mood & Affect:The patient's mood and affect are appropriate   · Sensation: Sensation is intact without deficit  · Coordination/Balance: Good coordination     LUMBAR/SACRAL EXAMINATION:  · Inspection: Local inspection shows no step-off or bruising. Lumbar alignment is normal.  Sagittal and Coronal balance is neutral.      · Palpation:   No evidence of tenderness at the midline. No tenderness bilaterally at the paraspinal or trochanters. There is no step-off or paraspinal spasm. · Range of Motion:   Able to sit forward flex without pain  · Strength:   Strength testing is 5/5 in all muscle groups tested. · Special Tests:   Straight leg raise and crossed SLR negative. Leg length and pelvis level.  0 out of 5 Lynsey's signs. · Skin: There are no rashes, ulcerations or lesions. · Reflexes: Reflexes are symmetrically 1+ with reinforcement at the patellar and ankle tendons.   Clonus absent bilaterally at the feet. · Gait & station: Normal unassisted  · Additional Examinations: Positive right FFT, mild tenderness right SI. Right hip no pain with range of motion. · RIGHT LOWER EXTREMITY: Inspection/examination of the right lower extremity does not show any tenderness, deformity or injury. Range of motion is full. There is no gross instability. There are no rashes, ulcerations or lesions. Strength and tone are normal.  · LEFT LOWER EXTREMITY:  Inspection/examination of the left lower extremity does not show any tenderness, deformity or injury. Range of motion is full. There is no gross instability. There are no rashes, ulcerations or lesions. Strength and tone are normal.    Diagnostic Testing:   Updated lumbar MRI scan report independently reviewed May 28, 2020 showing disc bulging L3-4 with at least moderate bilateral foraminal stenosis     2 views lumbar spine 5/21/2020 L3-4 retrolisthesis with moderate to severe DDD, moderate to severe DDD L5-S1, multilevel DDD/spondylosis and facet arthropathy.  Left lateral spur L2-3, multilevel anterior spurring.  Hips show no significant OA     Lumbar MRI 2015 showed multilevel lumbar spondylosis and facet arthropathy with mild central and lateral recess stenosis L3-4     AP pelvis 9/10/2015: R>L SI sclerosis, w/out fusion      One view thoracic spine 7/23/2015: Marker placed right of midline T12 level, multilevel thoracic spondylosis w/diffuse flowing osteophytosis            Impression:  1) Chronic LBP, right radiculitis, neurogenic claudication--95% improved, right sacroiliitis  2) Moderate foraminal stenosis L3-4  3) Scoliosis, spondylosis, probable DISH  4) H/o GRACIA 2015 w/relief   5) Afib--Xarelto tx       Plan:  1) PT for right sacroiliitis  2) Diclofenac gel OTC  3) Discussed repeat L3-4 TX GRACIA #3 if radicular symptoms return or worsen          [facility]

## 2020-09-08 ENCOUNTER — TELEPHONE (OUTPATIENT)
Dept: CARDIOLOGY CLINIC | Age: 70
End: 2020-09-08

## 2020-09-10 NOTE — TELEPHONE ENCOUNTER
Patient called stating he has not received return call regarding samples. Spoke to [employer], she states we do not have any samples. Patient inquiring what other options are available.  Patient states he cannot afford medication and is currently out of medication

## 2020-10-12 ENCOUNTER — TELEPHONE (OUTPATIENT)
Dept: CARDIOLOGY CLINIC | Age: 70
End: 2020-10-12

## 2020-10-13 ENCOUNTER — TELEPHONE (OUTPATIENT)
Dept: CARDIOLOGY CLINIC | Age: 70
End: 2020-10-13

## 2020-11-10 NOTE — TELEPHONE ENCOUNTER
Medication Refill    Medication needing refilled:  metoprolol succinate (TOPROL XL) 50 MG extended release tablet       Dosage of the medication:    How are you taking this medication (QD, BID, TID, QID, PRN):  Take 1 tablet by mouth daily   30 or 90 day supply called in:  80  Which Pharmacy are we sending the medication to?:  [address] [facility], [address]

## 2020-11-11 RX ORDER — METOPROLOL SUCCINATE 50 MG/1
50 TABLET, EXTENDED RELEASE ORAL DAILY
Qty: 30 TABLET | Refills: 6 | Status: SHIPPED | OUTPATIENT
Start: 2020-11-11 | End: 2021-06-09 | Stop reason: SDUPTHER

## 2020-11-12 ENCOUNTER — TELEPHONE (OUTPATIENT)
Dept: CARDIOLOGY CLINIC | Age: 70
End: 2020-11-12

## 2020-11-12 NOTE — TELEPHONE ENCOUNTER
Previously pt called for a sample of Xarelto. Instead of a sample, a refill was called into the pharmacy. Pt needs samples today if possible due to the weekend coming up and he will be out. Medication Samples     Medication:  xarelto               Dosage of the medication:  20 mg  How are you taking this medication (QD, BID, TID, QID, PRN):  1 tab daily   in the office or Mail to your home?            in office.  Please call today to let pt know when they pick it up.

## 2020-12-08 ENCOUNTER — VIRTUAL VISIT (OUTPATIENT)
Dept: FAMILY MEDICINE CLINIC | Age: 70
End: 2020-12-08
Payer: COMMERCIAL

## 2020-12-08 VITALS — BODY MASS INDEX: 45.1 KG/M2 | HEIGHT: 70 IN | WEIGHT: 315 LBS | TEMPERATURE: 97 F

## 2020-12-08 PROCEDURE — G0439 PPPS, SUBSEQ VISIT: HCPCS | Performed by: FAMILY MEDICINE

## 2020-12-08 ASSESSMENT — PATIENT HEALTH QUESTIONNAIRE - PHQ9
SUM OF ALL RESPONSES TO PHQ QUESTIONS 1-9: 0
2. FEELING DOWN, DEPRESSED OR HOPELESS: 0
1. LITTLE INTEREST OR PLEASURE IN DOING THINGS: 0
SUM OF ALL RESPONSES TO PHQ9 QUESTIONS 1 & 2: 0
SUM OF ALL RESPONSES TO PHQ QUESTIONS 1-9: 0
SUM OF ALL RESPONSES TO PHQ QUESTIONS 1-9: 0

## 2020-12-08 ASSESSMENT — LIFESTYLE VARIABLES: HOW OFTEN DO YOU HAVE A DRINK CONTAINING ALCOHOL: 0

## 2020-12-08 NOTE — PATIENT INSTRUCTIONS
disease, and certain medicines. But, there are steps you can take to sharpen your mind and help preserve your memory. Challenge Your Brain   Regularly challenging your mind may help keeps it in top shape. Good mental exercises include:   Crossword puzzlesUse a dictionary if you need it; you will learn more that way. Brainteasers Try some! Crafts, such as wood working and sewing   Hobbies, such as gardening and building model airplanes   SocializingVisit old friends or join groups to meet new ones. Reading   Learning a new language   Taking a class, whether it be art history or kasey chi   TravelingExperience the food, history, and culture of your destination   Learning to use a computer   Going to museums, the theater, or thought-provoking movies   Changing things in your daily life, such as reversing your pattern in the grocery store or brushing your teeth using your nondominant hand   Use Memory Aids   There is no need to remember every detail on your own. These memory aids can help:   Calendars and day planners   Electronic organizers to store all sorts of helpful informationThese devices can \"beep\" to remind you of appointments. A book of days to record birthdays, anniversaries, and other occasions that occur on the same date every year   Detailed \"to-do\" lists and strategically placed sticky notes   Quick \"study\" sessionsBefore a gathering, review who will be there so their names will be fresh in your mind. Establish routinesFor example, keep your keys, wallet, and umbrella in the same place all the time or take medicine with your 8:00 AM glass of juice   Live a Healthy Life   Many actions that will keep your body strong will do the same for your mind. For example:   Talk to Your Doctor About Herbs and Supplements    Malnutrition and vitamin deficiencies can impair your mental function. For example, vitamin B12 deficiency can cause a range of symptoms, including confusion.  But, what if your decreased muscle strength, slowed reflexes)   Higher incidence of chronic health problems (eg, arthritis, diabetes) that may limit mobility, agility or sensory awareness   Side effects of medicine (eg, dizziness, blurred vision)especially medicines like prescription pain medicines and drugs used to treat mental health conditions   Depending on the brittleness of your bones, the consequences of a fall can be serious and long lasting. Home Life   Research by the Association of Aging [facility]) shows that some home accidents among older adults can be prevented by making simple lifestyle changes and basic modifications and repairs to the home environment. Here are some lifestyle changes that experts recommend:   Have your hearing and vision checked regularly. Be sure to wear prescription glasses that are right for you. Speak to your doctor or pharmacist about the possible side effects of your medicines. A number of medicines can cause dizziness. If you have problems with sleep, talk to your doctor. Limit your intake of alcohol. If necessary, use a cane or walker to help maintain your balance. Wear supportive, rubber-soled shoes, even at home. If you live in a region that gets wintry weather, you may want to put special cleats on your shoes to prevent you from slipping on the snow and ice. Exercise regularly to help maintain muscle tone, agility, and balance. Always hold the banister when going up or down stairs. Also, use  bars when getting in or out of the bath or shower, or using the toilet. To avoid dizziness, get up slowly from a lying down position. Sit up first, dangling your legs for a minute or two before rising to a standing position. Overall Home Safety Check   According to the Consumer Product Safety Commision's \"Older Consumer Home Safety Checklist,\" it is important to check for potential hazards in each room. And remember, proper lighting is an essential factor in home safety.  If you cannot see clearly, you are more likely to fall. Important questions to ask yourself include:   Are lamp, electric, extension, and telephone cords placed out of the flow of traffic and maintained in good condition? Have frayed cords been replaced? Are all small rugs and runners slip resistant? If not, you can secure them to the floor with a special double-sided carpet tape. Are smoke detectors properly locatedone on every floor of your home and one outside of every sleeping area? Are they in good working order? Are batteries replaced at least once a year? Do you have a well-maintained carbon monoxide detector outside every sleeping are in your home? Does your furniture layout leave plenty of space to maneuver between and around chairs, tables, beds, and sofas? Are hallways, stairs and passages between rooms well lit? Can you reach a lamp without getting out of bed? Are floor surfaces well maintained? Shag rugs, high-pile carpeting, tile floors, and polished wood floors can be particularly slippery. Stairs should always have handrails and be carpeted or fitted with a non-skid tread. Is your telephone easily reachable. Is the cord safely tucked away? Room by Room   According to the Association of Aging, bathrooms and matilda are the two most potentially hazardous rooms in your home. In the Kitchen    Be sure your stove is in proper working order and always make sure burners and the oven are off before you go out or go to sleep. Keep pots on the back burners, turn handles away from the front of the stove, and keep stove clean and free of grease build-up. Kitchen ventilation systems and range exhausts should be working properly. Keep flammable objects such as towels and pot holders away from the cooking area except when in use. Make sure kitchen curtains are tied back. Move cords and appliances away from the sink and hot surfaces.  If extension cords are needed, install wiring guides so they do not hang over the sink, range, or working areas. Look for coffee pots, kettles and toaster ovens with automatic shut-offs. Keep a mop handy in the kitchen so you can wipe up spills instantly. You should also have a small fire extinguisher. Arrange your kitchen with frequently used items on lower shelves to avoid the need to stand on a stepstool to reach them. Make sure countertops are well-lit to avoid injuries while cutting and preparing food. In the Bathroom    Use a non-slip mat or decals in the tub and shower, since wet, soapy tile or porcelain surfaces are extremely slippery. Make sure bathroom rugs are non-skid or tape them firmly to the floor. Bathtubs should have at least one, preferably two, grab bars, firmly attached to structural supports in the wall. (Do not use built-in soap holders or glass shower doors as grab bars.)    Tub seats fitted with non-slip material on the legs allow you to wash sitting down. For people with limited mobility, bathtub transfer benches allow you to slide safely into the tub. Raised toilet seats and toilet safety rails are helpful for those with knee or hip problems. In the [facility]    Make sure you use a nightlight and that the area around your bed is clear of potential obstacles. Be careful with electric blankets and never go to sleep with a heating pad, which can cause serious burns even if on a low setting. Use fire-resistant mattress covers and pillows, and NEVER smoke in bed. Keep a phone next to the bed that is programmed to dial 911 at the push of a button. If you have a chronic condition, you may want to sign on with an automatic call-in service. Typically the system includes a small pendant that connects directly to an emergency medical voice-response system. You should also make arrangements to stay in contact with someonefriend, neighbor, family memberon a regular schedule.    Fire Prevention   According to the [employer] (Smoke Alarms for Every) [facility], senior citizens are one of the two highest risk groups for death and serious injuries due to residential fires. When cooking, wear short-sleeved items, never a bulky long-sleeved robe. The [facility]'s Safety Checklist for Older Consumers emphasizes the importance of checking basements, garages, workshops and storage areas for fire hazards, such as volatile liquids, piles of old rags or clothing and overloaded circuits. Never smoke in bed or when lying down on a couch or recliner chair. Small portable electric or kerosene heaters are responsible for many home fires and should be used cautiously if at all. If you do use one, be sure to keep them away from flammable materials. In case of fire, make sure you have a pre-established emergency exit plan. Have a professional check your fireplace and other fuel-burning appliances yearly. Helping Hands   Baby boomers entering the montague years will continue to see the development of new products to help older adults live safely and independently in spite of age-related changes. Making Life More Livable  , by Louise Ashby, lists over 1,000 products for \"living well in the mature years,\" such as bathing and mobility aids, household security devices, ergonomically designed knives and peelers, and faucet valves and knobs for temperature control. Medical supply stores and organizations are good sources of information about products that improve your quality of life and insure your safety.      Last Reviewed: November 2009 Anant Dotson MD   Updated: 3/7/2011     ·

## 2020-12-08 NOTE — PROGRESS NOTES
Medicare Annual Wellness Visit  Name: Hector Huerta Date: 2020   MRN: [id number] Sex: Male   Age: 65 y.o. Ethnicity: Non-/Non    : 1950 Race: Valarie Gauthier is here for Medicare AWV    Screenings for behavioral, psychosocial and functional/safety risks, and cognitive dysfunction are all negative except as indicated below. These results, as well as other patient data from the [address] form, are documented in Flowsheets linked to this Encounter. Allergies   Allergen Reactions    Bee Venom     Adhesive Tape Rash       Prior to Visit Medications    Medication Sig Taking?  Authorizing Provider   metoprolol succinate (TOPROL XL) 50 MG extended release tablet Take 1 tablet by mouth daily  Candida Rausch MD   rivaroxaban (Rola Jose) 20 MG TABS tablet One tablet by mouth daily with breakfast  Candida Rausch MD   acetaminophen (TYLENOL) 500 MG tablet Take 1 tablet by mouth 4 times daily as needed for Pain  Baltazar Benson PA-C       Past Medical History:   Diagnosis Date    Arthritis     Atrial fibrillation ([facility]     Hiatal hernia     Hypertrophy of prostate without urinary obstruction and other lower urinary tract symptoms (LUTS)     Kidney stone         Neoplasm of uncertain behavior of skin     Obesity, unspecified     Organic sleep apnea, unspecified        Past Surgical History:   Procedure Laterality Date    ABLATION OF DYSRHYTHMIC FOCUS      COLONOSCOPY  2010    HERNIA REPAIR      umbilical 5106    LITHOTRIPSY Right 2016    OTHER SURGICAL HISTORY Right 2016    CYSTOSCOPY, RIGHT URETEROSCOPY, HOLMIUM LASER LITHOTRIPSY, STONE MANIPULATION AND EXTRACTION    OTHER SURGICAL HISTORY  10/12/2016    cystoscopy right stent removal    PAIN MANAGEMENT PROCEDURE Bilateral 2020    BILATERAL LUMBAR THREE FOUR EPIDURAL STEROID INJECTION SITE CONFIRMED BY FLUOROSCOPY performed by Lorraine Sage MD at [address] [place] [facility]    PAIN MANAGEMENT PROCEDURE Right 7/14/2020    RIGHT LUMBAR THREE FOUR EPIDURAL STEROID INJECTION SITE CONFIRMED BY FLUOROSCOPY performed by Dereck White [facility] at [facility]         Family History   Problem Relation Age of Onset    Cancer Mother         breast met to bone    Emphysema Father     Cancer Maternal Uncle     Cancer Maternal Grandmother     Heart Failure Maternal Grandfather     Asthma Neg Hx     Diabetes Neg Hx     Hypertension Neg Hx        CareTeam (Including outside providers/suppliers regularly involved in providing care):   Patient Care Team:  Lieutenant Yadiel MD as PCP - General  Lieutenant Yadiel MD as PCP - [facility] Empaneled Provider  Arnel Baron MD as Consulting Physician (Pulmonology)    Wt Readings from Last 3 Encounters:   12/08/20 (!) 320 lb (145.2 kg)   07/30/20 (!) 320 lb 1.7 oz (145.2 kg)   07/14/20 (!) 320 lb (145.2 kg)     Vitals:    12/08/20 1126   Temp: 97 °F (36.1 °C)   Weight: (!) 320 lb (145.2 kg)   Height: 5' 10\" (1.778 m)     Body mass index is 45.92 kg/m². Based upon direct observation of the patient, evaluation of cognition reveals recent and remote memory intact. Patient's complete Health Risk Assessment and screening values have been reviewed and are found in Flowsheets. The following problems were reviewed today and where indicated follow up appointments were made and/or referrals ordered. Positive Risk Factor Screenings with Interventions:     General Health and ACP:  General  In general, how would you say your health is?: Very Good  In the past 7 days, have you experienced any of the following?  New or Increased Pain, New or Increased Fatigue, Loneliness, Social Isolation, Stress or Anger?: None of These  Do you get the social and emotional support that you need?: Yes  Do you have a Living Will?: (!) No  Advance Directives     Power of [facility] Will ACP-Advance Directive ACP-Power of     Not on File Not on File Filed Filed      General Health Risk Interventions:  · No Living Will: Advance Care Planning addressed with patient today    Health Habits/Nutrition:  Health Habits/Nutrition  Do you exercise for at least 20 minutes 2-3 times per week?: Yes  Have you lost any weight without trying in the past 3 months?: No  Do you eat fewer than 2 meals per day?: No  Have you seen a dentist within the past year?: Yes  Body mass index: (!) 45.91  Health Habits/Nutrition Interventions:  · Inadequate physical activity:  educational materials provided to promote increased physical activity    Hearing/Vision:  No exam data present  Hearing/Vision  Do you or your family notice any trouble with your hearing?: (!) Yes  Do you have difficulty driving, watching TV, or doing any of your daily activities because of your eyesight?: No  Have you had an eye exam within the past year?: Yes  Hearing/Vision Interventions:  · Hearing concerns:  patient declines any further evaluation/treatment for hearing issues    Personalized Preventive Plan   Current Health Maintenance Status    There is no immunization history on file for this patient. Health Maintenance   Topic Date Due    DTaP/Tdap/Td vaccine (1 - Tdap) 07/16/1967    Shingles Vaccine (1 of 2) 07/16/1998    Annual Wellness Visit (AWV)  05/29/2019    Colon Cancer Screen FIT/FOBT  06/07/2019    Flu vaccine (1) 09/01/2020    Pneumococcal 65+ years Vaccine (1 of 1 - PPSV23) 03/23/2021 (Originally 7/16/2013)    Lipid screen  05/17/2021    AAA screen  Completed    Hepatitis C screen  Completed    Hepatitis A vaccine  Aged Out    Hepatitis B vaccine  Aged Out    Hib vaccine  Aged Out    Meningococcal (ACWY) vaccine  Aged Out     Recommendations for [place] Due: see orders and patient instructions/AVS.  .   Recommended screening schedule for the next 5-10 years is provided to the patient in written form: see Patient Instructions/AVS.    LETICIA, Tushar Hernandez, LPN, 19/0/9208, performed the documented evaluation under the direct supervision of the attending physician. Tatiana Hudson is a 65 y.o. male being evaluated by a Virtual Visit (phone visit) encounter to address concerns as mentioned above. A caregiver was present when appropriate. Due to this being a TeleHealth encounter (During [facility] public health emergency), evaluation of the following organ systems was limited: Vitals/Constitutional/EENT/Resp/CV/GI//MS/Neuro/Skin/Heme-Lymph-Imm. Pursuant to the emergency declaration under the [address] [place] [address], [address] [place] [address] authority and the Medical Reimb[employer]ments of [place] and [employer] Act, this Virtual Visit was conducted with patient's (and/or legal guardian's) consent, to reduce the patient's risk of exposure to COVID-19 and provide necessary medical care. The patient (and/or legal guardian) has also been advised to contact this office for worsening conditions or problems, and seek emergency medical treatment and/or call 911 if deemed necessary. Patient identification was verified at the start of the visit: Yes    Total time spent for this encounter: 10 min    Services were provided through a video synchronous discussion virtually to substitute for in-person clinic visit. Patient and provider were located at their individual homes. --Yuko Andrews LPN on 05/4/8248 at 22:06 AM    An electronic signature was used to authenticate this note. This encounter was performed under Riaz cristina MDs, direct supervision, 12/8/2020.

## 2020-12-21 ENCOUNTER — TELEPHONE (OUTPATIENT)
Dept: CARDIOLOGY CLINIC | Age: 70
End: 2020-12-21

## 2020-12-29 ENCOUNTER — TELEPHONE (OUTPATIENT)
Dept: ORTHOPEDIC SURGERY | Age: 70
End: 2020-12-29

## 2020-12-29 NOTE — TELEPHONE ENCOUNTER
General Question     Subject: LUMBAR  Patient and /or Facility Request: Tad Jimenez  Contact Number: [phone]

## 2020-12-29 NOTE — TELEPHONE ENCOUNTER
I spoke with nurse practitioner Evelin DecemberSharon at [facility] who states patient is admitted for intractable low back pain. Periodically pain radiating into the left leg. Pain is constant but increased with any movement or activity. Some relief with Norco 7.5 every 4 hours, Robaxin, Dilaudid from ED. She states there is no focal extremity weakness; no progressive numbness tingling. No recent fevers chills or infections. No recent injury or trauma. No recent bowel or bladder dysfunction. Covid test negative. Lumbar CT scan notes central and foraminal stenosis at L3-4 consistent with lumbar MRI May 2020 showing disc bulging at L3-4 with moderate foraminal stenosis. Discussed case with Dr. Kayla Riggs whom recommends pain control: prednisone, gabapentin and outpatient GRACIA. Follow-up after GRACIA. We will obtain ED/hospital records for review. If any progressive or worsening symptoms discussed surgical consult.       Lennox Yu, [facility]

## 2020-12-30 ENCOUNTER — TELEPHONE (OUTPATIENT)
Dept: CARDIOLOGY CLINIC | Age: 70
End: 2020-12-30

## 2020-12-30 ENCOUNTER — TELEPHONE (OUTPATIENT)
Dept: FAMILY MEDICINE CLINIC | Age: 70
End: 2020-12-30

## 2020-12-30 NOTE — TELEPHONE ENCOUNTER
CARDIAC CLEARANCE     What type of procedure are you having? Epidural in lower spine. No anesthesia   Which physician is performing your procedure? Dr Erasmo Zambrano from [place]  When is your procedure scheduled for? [address] [facility]  Where are you having this procedure? 1/5/21  Are you taking Blood Thinners? If so what? (Name/dose/frequesncy)  Xarelto   Does the surgeon want you to stop your blood thinner? If so for how long?  4 days prior to procedure  Phone Number and Contact Name for Physicians office:  [phone]  Fax number to send information:  [phone]    Need response no later than noon on 12/31/20    Please call pt to advise as well.

## 2020-12-30 NOTE — TELEPHONE ENCOUNTER
[facility] Trans[facility] Initial Follow Up Call    Outreach made within 2 business days of discharge: Yes    Patient: Erasmo Rowan Patient : 1950   MRN: [id number]  Reason for Admission: There are no discharge diagnoses documented for the most recent discharge. Discharge Date: 2020      Spoke with: The Patient     Discharge department/facility: [facility]    TCM Interactive Patient Contact:  Was patient able to fill all prescriptions: Yes  Was patient instructed to bring all medications to the follow-up visit: Yes  Is patient taking all medications as directed in the discharge summary?  Yes  Does patient understand their discharge instructions: Yes  Does patient have questions or concerns that need addressed prior to 7-14 day follow up office visit: no    Scheduled appointment with PCP within 7-14 days    Follow Up  Future Appointments   Date Time Provider Duong Hunter   2021  2:20 PM Tatiana Garcia MD [facility]   2/3/2021  1:45 PM MD Lary Long Ra [address] [place] [address]

## 2020-12-30 NOTE — TELEPHONE ENCOUNTER
Can you advise on cardiac risk assessment and how long to hold [address] for epidural? Thank you. [id number] Score for Atrial Fibrillation Stroke Risk   Risk   Factors  Component Value   C CHF No 0   H HTN No 0   A2 Age >= 76 No,  (71 y.o.) 0   D DM No 0   S2 Prior Stroke/TIA No 0   V Vascular Disease No 0   A Age 74-69 Yes,  (71 y.o.) 1   Sc Sex male 0    [id number]  Score  1   Score last updated 12/30/20 5:73 PM EST    Click here for a link to the UpToDate guideline \"Atrial Fibrillation: Anticoagulation therapy to prevent embolization    Disclaimer: Risk Score calculation is dependent on accuracy of patient problem list and past encounter diagnosis.

## 2020-12-31 NOTE — LETTER
[address] Cardiology - [address] [facility] [address] [facility]  Phone: [phone]  Fax: [phone]    So Wilde MD     Re: Ani Branch [birth year] December 31, 2020    To whom it may concern,     Patient is at low cardiac risk for a non-cardiac procedure. Patient may hold Xarelto for 3 days. Attempted to reach your office regarding this matter but office was closed. Called patient and informed him of cardiac clearance and recommendation from [facility] to hold [facility] for 3 days. Please call our office if you have any questions.      Sincerely,        So Wilde MD

## 2020-12-31 NOTE — PROGRESS NOTES
Called and spoke with answering service. They are unable to take a message regarding patients cardiac clearance that was requested. Their office is now closed. Attempted to call after hours office ([phone]) and no answer/no VM. Faxed the letter for cardiac clearance per [facility] and informed patient of this information. Patient v/u.

## 2021-01-05 ENCOUNTER — TELEPHONE (OUTPATIENT)
Dept: ORTHOPEDIC SURGERY | Age: 71
End: 2021-01-05

## 2021-01-05 ENCOUNTER — OFFICE VISIT (OUTPATIENT)
Dept: FAMILY MEDICINE CLINIC | Age: 71
End: 2021-01-05
Payer: MEDICARE

## 2021-01-05 VITALS
HEART RATE: 76 BPM | BODY MASS INDEX: 45.1 KG/M2 | HEIGHT: 70 IN | DIASTOLIC BLOOD PRESSURE: 76 MMHG | OXYGEN SATURATION: 98 % | SYSTOLIC BLOOD PRESSURE: 134 MMHG | TEMPERATURE: 97.2 F | WEIGHT: 315 LBS

## 2021-01-05 DIAGNOSIS — G89.29 CHRONIC LEFT-SIDED LOW BACK PAIN WITH LEFT-SIDED SCIATICA: Primary | ICD-10-CM

## 2021-01-05 DIAGNOSIS — M54.42 CHRONIC LEFT-SIDED LOW BACK PAIN WITH LEFT-SIDED SCIATICA: Primary | ICD-10-CM

## 2021-01-05 DIAGNOSIS — R31.29 MICROSCOPIC HEMATURIA: ICD-10-CM

## 2021-01-05 LAB
BILIRUBIN, POC: NEGATIVE
BLOOD URINE, POC: NORMAL
CLARITY, POC: CLEAR
COLOR, POC: YELLOW
GLUCOSE URINE, POC: NEGATIVE
KETONES, POC: NEGATIVE
LEUKOCYTE EST, POC: NEGATIVE
NITRITE, POC: NEGATIVE
PH, POC: 5.5
PROTEIN, POC: NEGATIVE
SPECIFIC GRAVITY, POC: 1.25
UROBILINOGEN, POC: 0.2

## 2021-01-05 PROCEDURE — 99214 OFFICE O/P EST MOD 30 MIN: CPT | Performed by: FAMILY MEDICINE

## 2021-01-05 PROCEDURE — 81002 URINALYSIS NONAUTO W/O SCOPE: CPT | Performed by: FAMILY MEDICINE

## 2021-01-05 PROCEDURE — G8510 SCR DEP NEG, NO PLAN REQD: HCPCS | Performed by: FAMILY MEDICINE

## 2021-01-05 RX ORDER — PREDNISONE 10 MG/1
40 TABLET ORAL DAILY
COMMUNITY
End: 2021-06-15 | Stop reason: ALTCHOICE

## 2021-01-05 RX ORDER — GABAPENTIN 300 MG/1
600 CAPSULE ORAL 3 TIMES DAILY
COMMUNITY
End: 2021-06-15

## 2021-01-05 ASSESSMENT — PATIENT HEALTH QUESTIONNAIRE - PHQ9
SUM OF ALL RESPONSES TO PHQ QUESTIONS 1-9: 0
SUM OF ALL RESPONSES TO PHQ9 QUESTIONS 1 & 2: 0
SUM OF ALL RESPONSES TO PHQ QUESTIONS 1-9: 0

## 2021-01-05 ASSESSMENT — ENCOUNTER SYMPTOMS
BACK PAIN: 1
CONSTIPATION: 0
DIARRHEA: 1

## 2021-01-05 NOTE — PROGRESS NOTES
Chief Complaint   Patient presents with    Follow-Up from Hospital     Patient was in [facility] 20-20 for back issues. HPI:  Millicent Tobar is a 65 y.o. (: 1950) here today   for follow up from hospital.  Patient was in [facility] 20-20 for back pain. Admitted w/ sig back pain. Pain to left hip as well. Was admitted for pain control and to calm down back pain. Initially unable to move, sit, stand. Had CT done. hospitalist called ortho Johny Soriano) and he recommended high dose steroids and gabapentin. Planning on epidural injection. Working w/ ortho and insurance to be arranged. Still some sig pain, but overall sig improved. No urinary incontinence. occas pain down lateral left leg and mild weakness noted. Has a few days of steroid left. Tapering down. Taking gabapentin as well. No blood in urine. Did have blood in urine in hospital. Has been taking tylenol on occas. Has not taken any norco for few days. Will take if severe. HPI    Patient's medications, allergies, past medical, surgical, social and family histories were reviewed and updated as appropriate. ROS:  Review of Systems   Constitutional: Negative for fever. Gastrointestinal: Positive for diarrhea. Negative for constipation. Genitourinary: Negative for difficulty urinating. Musculoskeletal: Positive for back pain and gait problem.            Microscopic Examination (no units)   Date Value   2018 YES     LDL Calculated (mg/dL)   Date Value   2016 90       Past Medical History:   Diagnosis Date    Arthritis     Atrial fibrillation ([facility] [address]     Hiatal hernia     Hypertrophy of prostate without urinary obstruction and other lower urinary tract symptoms (LUTS)     Kidney stone         Neoplasm of uncertain behavior of skin     Obesity, unspecified     Organic sleep apnea, unspecified        Family History   Problem Relation Age of Onset    Cancer Mother         breast met to bone  Emphysema Father     Cancer Maternal Uncle     Cancer Maternal Grandmother     Heart Failure Maternal Grandfather     Asthma Neg Hx     Diabetes Neg Hx     Hypertension Neg Hx        Social History     Socioeconomic History    Marital status:      Spouse name: Not on file    Number of children: Not on file    Years of education: Not on file    Highest education level: Not on file   Occupational History    Not on file   Social Needs    Financial resource strain: Not on file    Food insecurity     Worry: Not on file     Inability: Not on file   [employer] needs     Medical: Not on file     Non-medical: Not on file   Tobacco Use    Smoking status: Former Smoker     Packs/day: 1.50     Years: 30.00     Pack years: 45.00     Quit date: 1988     Years since quittin.0    Smokeless tobacco: Never Used   Substance and Sexual Activity    Alcohol use: No     Alcohol/week: 0.0 standard drinks    Drug use: No    Sexual activity: Not on file   Lifestyle    Physical activity     Days per week: Not on file     Minutes per session: Not on file    Stress: Not on file   Relationships    Social connections     Talks on phone: Not on file     Gets together: Not on file     Attends Rastafari service: Not on file     Active member of club or organization: Not on file     Attends meetings of clubs or organizations: Not on file     Relationship status: Not on file    Intimate partner violence     Fear of current or ex partner: Not on file     Emotionally abused: Not on file     Physically abused: Not on file     Forced sexual activity: Not on file   Other Topics Concern    Not on file   Social History Narrative    Not on file       Prior to Visit Medications    Medication Sig Taking?  Authorizing Provider   predniSONE (DELTASONE) 10 MG tablet Take 40 mg by mouth daily Yes Historical Provider, MD gabapentin (NEURONTIN) 300 MG capsule Take 600 mg by mouth 3 times daily. Yes Historical Provider, MD   metoprolol succinate (TOPROL XL) 50 MG extended release tablet Take 1 tablet by mouth daily Yes Eleanor Thakkar MD   rivaroxaban (XARELTO) 20 MG TABS tablet One tablet by mouth daily with breakfast Yes Eleanor Thakkar MD   acetaminophen (TYLENOL) 500 MG tablet Take 1 tablet by mouth 4 times daily as needed for Pain Yes Jos Madden PA-C       Allergies   Allergen Reactions    Bee Venom     Adhesive Tape Rash       OBJECTIVE:    /76   Pulse 76   Temp 97.2 °F (36.2 °C)   Ht 5' 10\" (1.778 m)   Wt (!) 332 lb 9.6 oz (150.9 kg)   SpO2 98%   BMI 47.72 kg/m²     BP Readings from Last 2 Encounters:   01/05/21 134/76   07/14/20 137/81       Wt Readings from Last 3 Encounters:   01/05/21 (!) 332 lb 9.6 oz (150.9 kg)   12/08/20 (!) 320 lb (145.2 kg)   07/30/20 (!) 320 lb 1.7 oz (145.2 kg)       Physical Exam  Constitutional:       Appearance: He is well-developed. HENT:      Head: Normocephalic and atraumatic. Eyes:      Conjunctiva/sclera: Conjunctivae normal.   Neck:      Trachea: No tracheal deviation. Cardiovascular:      Rate and Rhythm: Normal rate and regular rhythm. No extrasystoles are present. Pulmonary:      Effort: Pulmonary effort is normal.   Abdominal:      Palpations: Abdomen is soft. Tenderness: There is no abdominal tenderness. Musculoskeletal:      Lumbar back: He exhibits tenderness. Back:    Skin:     General: Skin is warm and dry. Neurological:      General: No focal deficit present. Mental Status: He is alert and oriented to person, place, and time. Psychiatric:         Mood and Affect: Mood normal.         Behavior: Behavior normal.           ASSESSMENT/PLAN:    1.  Chronic left-sided low back pain with left-sided sciatica Continue gabapentin steroid taper for now. Proceed with epidural steroid injection as planned by orthopedics. The patient did ask about stem cell injections. I recommend he discuss this with orthopedics and decide whether that may be something he wants to consider in the future. Follow-up with Ortho as scheduled    2. Microscopic hematuria  Urine was positive for blood in the hospital.  Repeat today was also positive for trace blood. Will send for culture. If negative and continues to be positive for blood, will need to consider urology referral  - POCT Urinalysis no Micro  - Culture, Urine    Patient mention at the end of the visit that his brother-in-law came to visit them over the weekend. He subsequently found out that his brother-in-law had been exposed to someone who was positive for Covid. His brother-in-law was asymptomatic at the time. The patient has not had any symptoms. Given no direct contact with a known positive case, I did not recommend quarantine at this time and recommend monitoring symptoms    This document was prepared by a combination of typing and transcription through a voice recognition software.

## 2021-01-05 NOTE — TELEPHONE ENCOUNTER
Galileacasey Blandon [id number]    Date: 01/05/2021 - 01/18/2021  Type of SX:  OP  Location: [facility]  CPT: 17374   DX Code: M51.36   M48.062   M54.16  SX area: Left L3 - L4 transforaminal GRACIA    Insurance: Anna Plush

## 2021-01-05 NOTE — TELEPHONE ENCOUNTER
Insurance changed from [id number] to [employer]. PA requsted via [employer] by online thru [employer] w/clinicals.   Reference # member ID

## 2021-01-08 LAB
ORGANISM: ABNORMAL
URINE CULTURE, ROUTINE: ABNORMAL

## 2021-01-08 RX ORDER — NITROFURANTOIN 25; 75 MG/1; MG/1
100 CAPSULE ORAL 2 TIMES DAILY
Qty: 20 CAPSULE | Refills: 0 | Status: SHIPPED | OUTPATIENT
Start: 2021-01-08 | End: 2021-01-18

## 2021-01-21 ENCOUNTER — NURSE ONLY (OUTPATIENT)
Dept: FAMILY MEDICINE CLINIC | Age: 71
End: 2021-01-21
Payer: MEDICARE

## 2021-01-21 DIAGNOSIS — R31.29 MICROSCOPIC HEMATURIA: Primary | ICD-10-CM

## 2021-01-21 LAB
BILIRUBIN, POC: NORMAL
BLOOD URINE, POC: NORMAL
CLARITY, POC: CLEAR
COLOR, POC: YELLOW
GLUCOSE URINE, POC: NEGATIVE
KETONES, POC: NEGATIVE
LEUKOCYTE EST, POC: NEGATIVE
NITRITE, POC: NEGATIVE
PH, POC: 5.5
PROTEIN, POC: NEGATIVE
SPECIFIC GRAVITY, POC: >=1.03
UROBILINOGEN, POC: 0.2

## 2021-01-21 PROCEDURE — 81002 URINALYSIS NONAUTO W/O SCOPE: CPT | Performed by: FAMILY MEDICINE

## 2021-01-22 LAB — URINE CULTURE, ROUTINE: NORMAL

## 2021-02-03 ENCOUNTER — OFFICE VISIT (OUTPATIENT)
Dept: CARDIOLOGY CLINIC | Age: 71
End: 2021-02-03
Payer: MEDICARE

## 2021-02-03 VITALS
WEIGHT: 315 LBS | HEIGHT: 70 IN | HEART RATE: 72 BPM | SYSTOLIC BLOOD PRESSURE: 116 MMHG | BODY MASS INDEX: 45.1 KG/M2 | DIASTOLIC BLOOD PRESSURE: 72 MMHG

## 2021-02-03 DIAGNOSIS — I48.0 PAROXYSMAL ATRIAL FIBRILLATION (HCC): Primary | ICD-10-CM

## 2021-02-03 PROCEDURE — 93000 ELECTROCARDIOGRAM COMPLETE: CPT | Performed by: INTERNAL MEDICINE

## 2021-02-03 PROCEDURE — 99214 OFFICE O/P EST MOD 30 MIN: CPT | Performed by: INTERNAL MEDICINE

## 2021-02-03 NOTE — PROGRESS NOTES
Today, 2/3/2021, patient reports he feels well. He reports he has not had any recurrence of AF. He reports he has been able to be more active over the last few weeks because his sciatica has not been bothering his as much. He reports he has sciatica and is working on scheduling an appointment for an epidural. He hopes to be more active after this procedure. He reports he is taking all medications as prescribed and tolerates them well. He denies any bleeding or bruising issues. Patient denies current edema,  chest pain, sob, palpitations, dizziness or syncope. Past Medical History:   has a past medical history of Arthritis, Atrial fibrillation ([address] Hiatal hernia, Hypertrophy of prostate without urinary obstruction and other lower urinary tract symptoms (LUTS), Kidney stone, Neoplasm of uncertain behavior of skin, Obesity, unspecified, and Organic sleep apnea, unspecified. Surgical History:   has a past surgical history that includes hernia repair; Tonsillectomy; Colonoscopy (04/01/2010); Lithotripsy (Right, 09/2016); other surgical history (Right, 09/25/2016); other surgical history (10/12/2016); ablation of dysrhythmic focus; Pain management procedure (Bilateral, 6/16/2020); and Pain management procedure (Right, 7/14/2020). Social History:   reports that he quit smoking about 33 years ago. He has a 45.00 pack-year smoking history. He has never used smokeless tobacco. He reports that he does not drink alcohol or use drugs. Family History:  family history includes Cancer in his maternal grandmother, maternal uncle, and mother; Emphysema in his father; Heart Failure in his maternal grandfather.      Home Medications:  Outpatient Encounter Medications as of 2/3/2021   Medication Sig Dispense Refill    metoprolol succinate (TOPROL XL) 50 MG extended release tablet Take 1 tablet by mouth daily 30 tablet 6    rivaroxaban (XARELTO) 20 MG TABS tablet One tablet by mouth daily with breakfast 30 tablet 5  acetaminophen (TYLENOL) 500 MG tablet Take 1 tablet by mouth 4 times daily as needed for Pain 120 tablet 0    predniSONE (DELTASONE) 10 MG tablet Take 40 mg by mouth daily      gabapentin (NEURONTIN) 300 MG capsule Take 600 mg by mouth 3 times daily. No facility-administered encounter medications on file as of 2/3/2021. Allergies:  Bee venom and Adhesive tape     Review of Systems   Constitutional: Negative. HENT: Negative. Eyes: Negative. Respiratory: Negative. Cardiovascular: Negative. Gastrointestinal: Negative. Genitourinary: Negative. Musculoskeletal: Negative. Skin: Negative. Neurological: Negative. Hematological: Negative. Psychiatric/Behavioral: Negative. /72   Pulse 72   Ht 5' 10\" (1.778 m)   Wt (!) 324 lb (147 kg)   BMI 46.49 kg/m²     Data:     ECG 2/3/2021: SR w/ occasional PACs 71 BPM.     7/17/18 Echo  Summary   Low normal left ventricular systolic function with an estimated ejection   fraction of 50-55%.   Normal left ventricular diastolic filling pressure.   Right ventricular systolic function is mildly reduced to low normal.   The right ventricle is mildly enlarged.   The right atrium is mildly dilated.   Mild mitral regurgitation.   Mild aortic regurgitation.   Mild tricuspid regurgitation.   Normal systolic pulmonary artery pressure (SPAP) estimated at 36 mmHg (RA   pressure 8 mmHg).  Mild pulmonic regurgitation. Objective:  Physical Exam   Constitutional: He is oriented to person, place, and time. He appears well-developed and well-nourished. HENT:   Head: Normocephalic and atraumatic. Eyes: Pupils are equal, round, and reactive to light. Neck: Normal range of motion. Cardiovascular: Normal rate, irregular rhythm and normal heart sounds. Pulmonary/Chest: Effort normal and breath sounds normal.   Abdominal: Soft. No tenderness. Musculoskeletal: Normal range of motion. He exhibits no edema. Neurological: He is alert and oriented to person, place, and time. Skin: Skin is warm and dry. Psychiatric: He has a normal mood and affect. Assessment:  1. AF-persistent. No AF since the PVI 1/22/20. He is not currently taking antiarrhythmic medication. Plan:  1. Continue medications as prescribed. -OK to hold AOC x4 days prior to epidural.   2. Continue to stay active as tolerated. 3. Follow up with EP NP in 6 months, JMB in 1 year. This note has been scribed in the presence of Letty Henry MD by Dominic Cavazos RN.     I, Dr. Letty Henry, personally performed the services described in this documentation as scribed by Dominic Cavazos RN in my presence, and it is both accurate and complete.         Letty Henry MD

## 2021-02-03 NOTE — PATIENT INSTRUCTIONS
Plan:  1. Continue medications as prescribed. 2. Continue to stay active as tolerated. 3. Follow up with EP NP in 6 months, FAB in 1 year.

## 2021-02-03 NOTE — LETTER
[facility]   Cardiac Follow Up    Date: 2/3/21  Patient Name: Laurie Correia  YOB: 1950    Primary Care Physician: Rita Chaudhry MD      CHIEF COMPLAINT:   Chief Complaint   Patient presents with    6 Month Follow-Up    Atrial Fibrillation       HPI:  Laurie Correia is a 65 y.o. male originally seen in 6/2018 for new onset AF, seen at PCP office. Has h/o obesity and untreated SAM, no cardiac history. ECG from 6/25/18 confirmed AF with controlled rate of 88 bpm.  He has been diagnosed with SAM, but cannot tolerate the CPAP. He has been sleeping in a recliner. Normal echo (7/17/18) with EF of 50-55%. On Xarelto for stroke prevention. Underwent DCCV on 8/16/18. He remained in sinus rhythm until his follow up visit 6/2019, he was noted to be in AF with a HR of 119. He then was started on Toprol 25mg once a day for rate control and underwent a DCCV on 6/25/2019. Rythmol was increased to 300mg every 8 hours on 10/29/2019. He underwent a cardioversion on 11/20/19. He was started on Sotalol 11/2019. His ECG on 12/26/19 showed afib HR 72. He remained short of breath and fatigued. He underwent RFCA for AF ablation on 1/22/2020 and was started on Tikosyn 1/23/2020. Echo on 3/12/2020 showed an EF of 55%. Patient's Tikosyn was discontinude in 4/28/2020. His cardiac event monitor 5/3/20-5/17/20 showed average HR 72 bpm () with 13% PACs, brief PAT, and no AF. He stated that he had no symptoms when he was wearing the cardiac event monitor. Wife stated that he is fatigued and his color is not good when he is in AF. 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 Today, 2/3/2021, patient reports he feels well. He reports he has not had any recurrence of AF. He reports he has been able to be more active over the last few weeks because his sciatica has not been bothering his as much. He reports he has sciatica and is working on scheduling an appointment for an epidural. He hopes to be more active after this procedure. He reports he is taking all medications as prescribed and tolerates them well. He denies any bleeding or bruising issues. Patient denies current edema,  chest pain, sob, palpitations, dizziness or syncope. Past Medical History:   has a past medical history of Arthritis, Atrial fibrillation ([address] Hiatal hernia, Hypertrophy of prostate without urinary obstruction and other lower urinary tract symptoms (LUTS), Kidney stone, Neoplasm of uncertain behavior of skin, Obesity, unspecified, and Organic sleep apnea, unspecified. Surgical History:   has a past surgical history that includes hernia repair; Tonsillectomy; Colonoscopy (04/01/2010); Lithotripsy (Right, 09/2016); other surgical history (Right, 09/25/2016); other surgical history (10/12/2016); ablation of dysrhythmic focus; Pain management procedure (Bilateral, 6/16/2020); and Pain management procedure (Right, 7/14/2020). Social History:   reports that he quit smoking about 33 years ago. He has a 45.00 pack-year smoking history. He has never used smokeless tobacco. He reports that he does not drink alcohol or use drugs. Family History:  family history includes Cancer in his maternal grandmother, maternal uncle, and mother; Emphysema in his father; Heart Failure in his maternal grandfather.      Home Medications:  Outpatient Encounter Medications as of 2/3/2021   Medication Sig Dispense Refill    metoprolol succinate (TOPROL XL) 50 MG extended release tablet Take 1 tablet by mouth daily 30 tablet 6    rivaroxaban (XARELTO) 20 MG TABS tablet One tablet by mouth daily with breakfast 30 tablet 5  acetaminophen (TYLENOL) 500 MG tablet Take 1 tablet by mouth 4 times daily as needed for Pain 120 tablet 0    predniSONE (DELTASONE) 10 MG tablet Take 40 mg by mouth daily      gabapentin (NEURONTIN) 300 MG capsule Take 600 mg by mouth 3 times daily. No facility-administered encounter medications on file as of 2/3/2021. Allergies:  Bee venom and Adhesive tape     Review of Systems   Constitutional: Negative. HENT: Negative. Eyes: Negative. Respiratory: Negative. Cardiovascular: Negative. Gastrointestinal: Negative. Genitourinary: Negative. Musculoskeletal: Negative. Skin: Negative. Neurological: Negative. Hematological: Negative. Psychiatric/Behavioral: Negative. /72   Pulse 72   Ht 5' 10\" (1.778 m)   Wt (!) 324 lb (147 kg)   BMI 46.49 kg/m²     Data:     ECG 2/3/2021: SR w/ occasional PACs 71 BPM.     7/17/18 Echo  Summary   Low normal left ventricular systolic function with an estimated ejection   fraction of 50-55%.   Normal left ventricular diastolic filling pressure.   Right ventricular systolic function is mildly reduced to low normal.   The right ventricle is mildly enlarged.   The right atrium is mildly dilated.   Mild mitral regurgitation.   Mild aortic regurgitation.   Mild tricuspid regurgitation.   Normal systolic pulmonary artery pressure (SPAP) estimated at 36 mmHg (RA   pressure 8 mmHg).  Mild pulmonic regurgitation. Objective:  Physical Exam   Constitutional: He is oriented to person, place, and time. He appears well-developed and well-nourished. HENT:   Head: Normocephalic and atraumatic. Eyes: Pupils are equal, round, and reactive to light. Neck: Normal range of motion. Cardiovascular: Normal rate, irregular rhythm and normal heart sounds. Pulmonary/Chest: Effort normal and breath sounds normal.   Abdominal: Soft. No tenderness. Musculoskeletal: Normal range of motion. He exhibits no edema. Neurological: He is alert and oriented to person, place, and time. Skin: Skin is warm and dry. Psychiatric: He has a normal mood and affect. Assessment:  1. AF-persistent. No AF since the PVI 1/22/20. He is not currently taking antiarrhythmic medication. Plan:  1. Continue medications as prescribed. -OK to hold AOC x4 days prior to epidural.   2. Continue to stay active as tolerated. 3. Follow up with EP NP in 6 months, JMB in 1 year. This note has been scribed in the presence of Molly Myers MD by Karrie Garrett RN.     I, Dr. Molly Myers, personally performed the services described in this documentation as scribed by Karrie Garrett RN in my presence, and it is both accurate and complete.         Molly Myers MD

## 2021-03-15 ENCOUNTER — TELEPHONE (OUTPATIENT)
Dept: CARDIOLOGY CLINIC | Age: 71
End: 2021-03-15

## 2021-03-16 NOTE — TELEPHONE ENCOUNTER
Spoke to pt. Let him know that samples of Xarelto 20 mg daily have been set aside for him and ready for p/u here at [employer].  Pt V/U.    TY

## 2021-04-15 ENCOUNTER — TELEPHONE (OUTPATIENT)
Dept: CARDIOLOGY CLINIC | Age: 71
End: 2021-04-15

## 2021-04-20 NOTE — TELEPHONE ENCOUNTER
LMOVM letting pt know that we have samples of Xarelto 20 mg ready to be p/u here at the [place] office.      TY

## 2021-05-10 ENCOUNTER — TELEPHONE (OUTPATIENT)
Dept: CARDIOLOGY CLINIC | Age: 71
End: 2021-05-10

## 2021-05-11 NOTE — TELEPHONE ENCOUNTER
RAY for pt letting him know that samples of Xarelto 20 mg are ready to be picked up here at the [facility].

## 2021-06-08 ENCOUNTER — TELEPHONE (OUTPATIENT)
Dept: FAMILY MEDICINE CLINIC | Age: 71
End: 2021-06-08

## 2021-06-08 ENCOUNTER — TELEPHONE (OUTPATIENT)
Dept: CARDIOLOGY CLINIC | Age: 71
End: 2021-06-08

## 2021-06-08 DIAGNOSIS — G89.29 CHRONIC MIDLINE LOW BACK PAIN WITHOUT SCIATICA: ICD-10-CM

## 2021-06-08 DIAGNOSIS — M54.50 CHRONIC MIDLINE LOW BACK PAIN WITHOUT SCIATICA: ICD-10-CM

## 2021-06-08 RX ORDER — BENZONATATE 200 MG/1
200 CAPSULE ORAL 3 TIMES DAILY PRN
Qty: 30 CAPSULE | Refills: 0 | Status: SHIPPED | OUTPATIENT
Start: 2021-06-08 | End: 2021-06-15

## 2021-06-08 RX ORDER — HYDROCODONE BITARTRATE AND ACETAMINOPHEN 5; 325 MG/1; MG/1
1 TABLET ORAL EVERY 6 HOURS PRN
Qty: 20 TABLET | Refills: 0 | Status: SHIPPED | OUTPATIENT
Start: 2021-06-08 | End: 2022-05-09 | Stop reason: SDUPTHER

## 2021-06-08 NOTE — TELEPHONE ENCOUNTER
Parainfluenza is viral.  Typically would not require abx unless secondary bacterial infxn. Tessalon 200mg po q8h prn with a full glass of water. #30, no rf. Symptomatic therapy suggested: rest, increase fluids, use mist of vaporizer prn, and call prn if symptoms persist or worsen.    Consider abx if sxs worsen or fail to improve

## 2021-06-08 NOTE — TELEPHONE ENCOUNTER
Refill  metoprolol succinate (TOPROL XL) 50 MG extended release tablet     [facility] [place] [facility] [address] [facility] [phone]

## 2021-06-08 NOTE — TELEPHONE ENCOUNTER
Patient also wanting to know if you will refill norco or if he has to be seen first    Last fill 7/21/2020   #20 no rf

## 2021-06-09 RX ORDER — METOPROLOL SUCCINATE 50 MG/1
50 TABLET, EXTENDED RELEASE ORAL DAILY
Qty: 30 TABLET | Refills: 6 | Status: SHIPPED | OUTPATIENT
Start: 2021-06-09 | End: 2022-04-11 | Stop reason: SDUPTHER

## 2021-06-15 ENCOUNTER — OFFICE VISIT (OUTPATIENT)
Dept: FAMILY MEDICINE CLINIC | Age: 71
End: 2021-06-15
Payer: MEDICARE

## 2021-06-15 VITALS
BODY MASS INDEX: 46.49 KG/M2 | HEART RATE: 83 BPM | DIASTOLIC BLOOD PRESSURE: 84 MMHG | WEIGHT: 315 LBS | TEMPERATURE: 97.6 F | SYSTOLIC BLOOD PRESSURE: 136 MMHG | OXYGEN SATURATION: 98 %

## 2021-06-15 DIAGNOSIS — B96.89 ACUTE BACTERIAL SINUSITIS: Primary | ICD-10-CM

## 2021-06-15 DIAGNOSIS — J01.90 ACUTE BACTERIAL SINUSITIS: Primary | ICD-10-CM

## 2021-06-15 DIAGNOSIS — J40 BRONCHITIS: ICD-10-CM

## 2021-06-15 DIAGNOSIS — R05.9 COUGH: ICD-10-CM

## 2021-06-15 PROCEDURE — 99213 OFFICE O/P EST LOW 20 MIN: CPT | Performed by: NURSE PRACTITIONER

## 2021-06-15 RX ORDER — GUAIFENESIN AND DEXTROMETHORPHAN HYDROBROMIDE 100; 10 MG/5ML; MG/5ML
5-10 SOLUTION ORAL EVERY 6 HOURS PRN
Qty: 100 ML | Refills: 0 | Status: SHIPPED | OUTPATIENT
Start: 2021-06-15 | End: 2021-06-25

## 2021-06-15 RX ORDER — AMOXICILLIN AND CLAVULANATE POTASSIUM 875; 125 MG/1; MG/1
1 TABLET, FILM COATED ORAL 2 TIMES DAILY
Qty: 20 TABLET | Refills: 0 | Status: SHIPPED | OUTPATIENT
Start: 2021-06-15 | End: 2021-06-25

## 2021-06-15 RX ORDER — METHYLPREDNISOLONE 4 MG/1
TABLET ORAL
Qty: 1 KIT | Refills: 0 | Status: SHIPPED | OUTPATIENT
Start: 2021-06-15 | End: 2021-06-21

## 2021-06-15 ASSESSMENT — ENCOUNTER SYMPTOMS
BACK PAIN: 0
CHEST TIGHTNESS: 0
PHOTOPHOBIA: 0
EYE DISCHARGE: 0
STRIDOR: 0
DIARRHEA: 1
COLOR CHANGE: 0
SHORTNESS OF BREATH: 0
HEARTBURN: 0
SINUS PRESSURE: 0
ABDOMINAL PAIN: 0
EYE ITCHING: 0
NAUSEA: 0
SINUS PAIN: 0
SORE THROAT: 0
EYE PAIN: 0
CHOKING: 0
COUGH: 1
TROUBLE SWALLOWING: 0
VOMITING: 0
VOICE CHANGE: 0
HEMOPTYSIS: 0
CONSTIPATION: 0
WHEEZING: 0
RHINORRHEA: 1
BLOOD IN STOOL: 0
EYE REDNESS: 0

## 2021-06-17 ENCOUNTER — TELEPHONE (OUTPATIENT)
Dept: CARDIOLOGY CLINIC | Age: 71
End: 2021-06-17

## 2021-06-17 NOTE — TELEPHONE ENCOUNTER
Pt wanted to know if we had any Xarelto samples. If so please call pt and let him know so he can stop and pick some up.

## 2021-06-18 NOTE — TELEPHONE ENCOUNTER
Pt calling again to see if we have Xarelto samples. Advised someone will call him back as soon as they get the message.

## 2021-06-18 NOTE — TELEPHONE ENCOUNTER
Spoke to pt. Let him know that we put samples of Xarelto 20 mg aside for him here at the [facility] office.  Pt V/u.      TY

## 2021-07-12 DIAGNOSIS — Z79.899 MEDICATION MANAGEMENT: Primary | ICD-10-CM

## 2021-07-12 DIAGNOSIS — I48.91 ATRIAL FIBRILLATION, UNSPECIFIED TYPE (HCC): Primary | ICD-10-CM

## 2021-07-12 NOTE — TELEPHONE ENCOUNTER
Advised we did not have any Xarelto 20 mg samples and pt would like a prescription sent to [facility] @ [phone].

## 2021-07-14 ENCOUNTER — NURSE ONLY (OUTPATIENT)
Dept: FAMILY MEDICINE CLINIC | Age: 71
End: 2021-07-14
Payer: MEDICARE

## 2021-07-14 DIAGNOSIS — Z79.899 MEDICATION MANAGEMENT: Primary | ICD-10-CM

## 2021-07-14 LAB
HCT VFR BLD CALC: 38.7 % (ref 40.5–52.5)
HEMOGLOBIN: 13.3 G/DL (ref 13.5–17.5)
MCH RBC QN AUTO: 31.2 PG (ref 26–34)
MCHC RBC AUTO-ENTMCNC: 34.4 G/DL (ref 31–36)
MCV RBC AUTO: 90.6 FL (ref 80–100)
PDW BLD-RTO: 15.4 % (ref 12.4–15.4)
PLATELET # BLD: 122 K/UL (ref 135–450)
PMV BLD AUTO: 7.1 FL (ref 5–10.5)
RBC # BLD: 4.27 M/UL (ref 4.2–5.9)
WBC # BLD: 4.5 K/UL (ref 4–11)

## 2021-07-14 PROCEDURE — 36415 COLL VENOUS BLD VENIPUNCTURE: CPT | Performed by: FAMILY MEDICINE

## 2021-07-14 NOTE — PROGRESS NOTES
Blood drawn per order. Needle size: 21 g  Site: L Antecubital.  First attempt successful No    Second attempt no    Pressure applied until bleeding stopped. Cotton ball and bandaid applied. Patient informed to call office or return if bleeding reoccurs and unable to stop.     Tubes drawn: 0 purple     0 red

## 2021-07-14 NOTE — PROGRESS NOTES
Blood drawn per order. Needle size: 23 g  Site: L Antecubital.  First attempt successful Yes    Second attempt no    Pressure applied until bleeding stopped. band aid  applied. Patient informed to call office or return if bleeding reoccurs and unable to stop.     Tubes drawn: 1 purple     0 red

## 2021-07-14 NOTE — RESULT ENCOUNTER NOTE
Consider repeat in 4 to 6 months. Past due for colon cancer screening. Had done fit test in the past.  Prior fit test was positive. We had ordered a colonoscopy in the past, but I do not see a result.   Please check with patient to see if he has had a colonoscopy in the past.  If not, given prior history of positive fit test, recommend proceeding with colonoscopy

## 2021-07-15 DIAGNOSIS — Z12.11 ENCOUNTER FOR SCREENING COLONOSCOPY: Primary | ICD-10-CM

## 2021-08-17 ENCOUNTER — TELEPHONE (OUTPATIENT)
Dept: FAMILY MEDICINE CLINIC | Age: 71
End: 2021-08-17

## 2021-08-17 NOTE — TELEPHONE ENCOUNTER
Pt called asking if we could set him up for his colonoscopy at [facility]. He said that [facility] doesn't have any avail openings. Pls advise.

## 2021-08-18 NOTE — PROGRESS NOTES
[address]   Electrophysiology Note              Date:  August 19, 2021  Patient name: Tiffanie Garcia  YOB: 1950    Primary Care physician: Marie Thomas MD    HISTORY OF PRESENT ILLNESS: The patient is a 66 y.o.  male with a history of AF, nonobstructive CAD, obesity and sleep apnea (cannot tolerate CPAP). In June of 2018, he was dealing with an episode of food poisoning and diarrhea x 1 week and was incidentally found to be in afib by his PCP. Had CV 8/2018. Had recurrence of AF in 11/2018. Stress test 4/2019 showed low risk and was started on Rythmol 5/2019. Had CV 6/2019 but was found to be in AF in 8/2019 during a preoperative exam. He was admitted in 11/2019, was started on sotalol, and had a successful CV. Had returned to symptomatic AF by his appt in 12/2019. In 1/2020 he had an extensive RFCA of afib and was started on Tikosyn. In 3/2020 he complained of CP and cardiac CTA showed nonobstructive CAD. Most recent echo in 3/2020 showed an EF of 55%. Tikosyn was stopped 4/2020 and event monitor 5/2020 showed PACs and PAT. Today he is being seen for atrial fibrillation. EKG shows SR with PACs with a HR of 77. He complains of some intermittent musculoskeletal chest and back pain. SOB has improved. Denies palpitations and dizziness. Past Medical History:   has a past medical history of Arthritis, Atrial fibrillation ([address] Hiatal hernia, Hypertrophy of prostate without urinary obstruction and other lower urinary tract symptoms (LUTS), Kidney stone, Neoplasm of uncertain behavior of skin, Obesity, unspecified, and Organic sleep apnea, unspecified. Past Surgical History:   has a past surgical history that includes hernia repair; Tonsillectomy; Colonoscopy (04/01/2010); Lithotripsy (Right, 09/2016); other surgical history (Right, 09/25/2016); other surgical history (10/12/2016); ablation of dysrhythmic focus;  Pain management procedure (Bilateral, 6/16/2020); and Pain management procedure (Right, 7/14/2020). Home Medications:    Prior to Admission medications    Medication Sig Start Date End Date Taking? Authorizing Provider   rivaroxaban (XARELTO) 20 MG TABS tablet One tablet by mouth daily with breakfast 7/12/21  Yes Tania Romero MD   metoprolol succinate (TOPROL XL) 50 MG extended release tablet Take 1 tablet by mouth daily 6/9/21  Yes Tania Romero MD   acetaminophen (TYLENOL) 500 MG tablet Take 1 tablet by mouth 4 times daily as needed for Pain 1/24/20  Yes Yates Lesch, PA-C       Allergies:  Bee venom and Adhesive tape    Social History:   reports that he quit smoking about 33 years ago. He has a 45.00 pack-year smoking history. He has never used smokeless tobacco. He reports that he does not drink alcohol and does not use drugs. Family History: family history includes Cancer in his maternal grandmother, maternal uncle, and mother; Emphysema in his father; Heart Failure in his maternal grandfather. Review of Systems   All 14-point review of systems are completed and pertinent positives are mentioned in the history of present illness. Other systems are reviewed and are negative. PHYSICAL EXAM:    Vital signs:    /60   Pulse 76   Temp 97.1 °F (36.2 °C)   Ht 5' 11\" (1.803 m)   Wt (!) 323 lb 12.8 oz (146.9 kg)   SpO2 96%   BMI 45.16 kg/m²      Constitutional and general appearance: alert, cooperative, no distress and appears stated age  HEENT: PERRL, no cervical lymphadenopathy. No masses palpable.  Normal oral mucosa  Respiratory:  · Normal excursion and expansion without use of accessory muscles  · Resp auscultation: Normal breath sounds without wheezing, rhonchi, and rales  Cardiovascular:     · The apical impulse is not displaced  · Heart tones are crisp and normal. Regular S1 and S2.  · Jugular venous pulsation Normal  · The carotid upstroke is normal in amplitude and contour without delay or bruit  · Peripheral pulses are symmetrical and full   Abdomen:  · No masses or tenderness  · Bowel sounds present  Extremities:  ·  No cyanosis or clubbing  ·  Trace lower extremity edema; + evidence of PVD  ·  Skin: warm and dry  Neurological:  · Alert and oriented  · Moves all extremities well  · No abnormalities of mood, affect, memory, mentation, or behavior are noted    DATA:    ECG 8/19/2021:  SR with PACs HR 77    Echo 3/2020:  Technically difficult examination due to body habitus. Definity® used for myocardial border enhancement. Normal left ventricle size, wall thickness, and systolic function with a visually estimated ejection fraction of 55%. Cannot exclude regional wall motion abnormalities secondary to poor endocardial visualization. Normal left ventricular diastolic filling pressure. The aortic root is dilated at 4.2 cm. Mild aortic regurgitation. Echo 7/17/2018:  Low normal left ventricular systolic function with an estimated ejection  fraction of 50-55%. Normal left ventricular diastolic filling pressure. Right ventricular systolic function is mildly reduced to low normal.  The right ventricle is mildly enlarged. The right atrium is mildly dilated. Mild mitral regurgitation. Mild aortic regurgitation. Mild tricuspid regurgitation. Normal systolic pulmonary artery pressure (SPAP) estimated at 36 mmHg (RA pressure 8 mmHg). Mild pulmonic regurgitation. Cardiac CTA 3/2020:  Limited by phase misregistration artifact.  No flow limiting coronary artery   stenosis noted with the above reservation.  Left atrium is dilated.       Scattered pulmonary nodules on the right measuring up to 8 mm. Stress 4/11/2019: Abnormal low risk myocardial perfusion study.    Cannot exclude small area of ischemia within the apical segment.    Normal left ventricular size, wall motion, and function.    The estimated left ventricular function is 64%. All labs and testing reviewed.   CARDIOLOGY LABS:   CBC: No results for input(s): WBC, HGB, HCT, PLT in the last 72 hours. BMP: No results for input(s): NA, K, CO2, BUN, CREATININE, LABGLOM, GLUCOSE in the last 72 hours. PT/INR: No results for input(s): PROTIME, INR in the last 72 hours. APTT:No results for input(s): APTT in the last 72 hours. FASTING LIPID PANEL:  Lab Results   Component Value Date    HDL 39 05/17/2016    LDLCALC 90 05/17/2016    TRIG 94 05/17/2016     LIVER PROFILE:No results for input(s): AST, ALT, ALB in the last 72 hours. Assessment:   1. Symptomatic paroxysmal (formerly persistent) atrial fibrillation: stable   -s/p successful CV 8/2018, 11/2018, and 6/2019 (on Rythmol)   -sotalol started 11/2019, s/p CV 11/2019   -s/p RFCA 1/2020 (Tikosyn started at that time, stopped 4/2020)    -[id number] score 1 (age)  2. Nonobstructive CAD   -per cardiac CTA 3/2020   -followed by Dr. Sindhu Gooden  3. Sleep apnea: cannot tolerate CPAP  4. Chest pain: noncardiac     Plan:   1. Continue Toprol and Xarelto   2. Annual CBC (due 7/2022) and BMP (due now, ordered)   3.  Follow up in 6 months     MDM: CHARISSE Acharya-GENTRY  [address]  [phone]

## 2021-08-19 ENCOUNTER — TELEPHONE (OUTPATIENT)
Dept: CARDIOLOGY CLINIC | Age: 71
End: 2021-08-19

## 2021-08-19 ENCOUNTER — OFFICE VISIT (OUTPATIENT)
Dept: CARDIOLOGY CLINIC | Age: 71
End: 2021-08-19
Payer: MEDICARE

## 2021-08-19 VITALS
HEART RATE: 76 BPM | BODY MASS INDEX: 44.1 KG/M2 | DIASTOLIC BLOOD PRESSURE: 60 MMHG | WEIGHT: 315 LBS | TEMPERATURE: 97.1 F | OXYGEN SATURATION: 96 % | HEIGHT: 71 IN | SYSTOLIC BLOOD PRESSURE: 132 MMHG

## 2021-08-19 DIAGNOSIS — I48.0 PAROXYSMAL ATRIAL FIBRILLATION (HCC): Primary | ICD-10-CM

## 2021-08-19 PROCEDURE — 93000 ELECTROCARDIOGRAM COMPLETE: CPT | Performed by: NURSE PRACTITIONER

## 2021-08-19 PROCEDURE — 99213 OFFICE O/P EST LOW 20 MIN: CPT | Performed by: NURSE PRACTITIONER

## 2021-08-19 NOTE — TELEPHONE ENCOUNTER
NPBB is pt okay to have cardiac clearance for a colonoscopy on 9/23/21. Pt is currently taking Xarelto. Pt needs to hold 1 day prior to procedure. Pt's last OV8/19/21NPBB. NPBB please advise. Cardiac clearance letter can be faxed to [phone].       TY

## 2021-12-14 DIAGNOSIS — L02.31 CELLULITIS AND ABSCESS OF BUTTOCK: ICD-10-CM

## 2021-12-14 DIAGNOSIS — L03.317 CELLULITIS AND ABSCESS OF BUTTOCK: ICD-10-CM

## 2022-01-07 RX ORDER — METOPROLOL SUCCINATE 50 MG/1
50 TABLET, EXTENDED RELEASE ORAL DAILY
Qty: 90 TABLET | Refills: 0 | Status: SHIPPED
Start: 2022-01-07 | End: 2022-03-07 | Stop reason: SDUPTHER

## 2022-01-07 NOTE — TELEPHONE ENCOUNTER
REFILL  metoprolol succinate (TOPROL XL) 50 MG extended release tablet     [facility] [address] [facility] [phone]

## 2022-01-18 NOTE — TELEPHONE ENCOUNTER
Spoke with pt, informed pt that we don't have samples. Pt v/u. Pt would like a prescription for 30 days 2 refills sent to [facility]. Pt leaving town for an emergency. rx pending. CBC 07/14/2021  BMP 03/12/2020  Last OV 08/19/2021 with NPBB   Last OV 02/03/2021 with FAB   Upcoming OV 03/07/2022  Blood work already order.    Thank you FAB

## 2022-03-07 ENCOUNTER — OFFICE VISIT (OUTPATIENT)
Dept: CARDIOLOGY CLINIC | Age: 72
End: 2022-03-07
Payer: MEDICARE

## 2022-03-07 VITALS
DIASTOLIC BLOOD PRESSURE: 80 MMHG | HEART RATE: 69 BPM | HEIGHT: 71 IN | SYSTOLIC BLOOD PRESSURE: 142 MMHG | BODY MASS INDEX: 44.1 KG/M2 | OXYGEN SATURATION: 97 % | WEIGHT: 315 LBS

## 2022-03-07 DIAGNOSIS — E66.9 OBESITY, UNSPECIFIED CLASSIFICATION, UNSPECIFIED OBESITY TYPE, UNSPECIFIED WHETHER SERIOUS COMORBIDITY PRESENT: ICD-10-CM

## 2022-03-07 DIAGNOSIS — I48.0 PAROXYSMAL ATRIAL FIBRILLATION (HCC): Primary | ICD-10-CM

## 2022-03-07 PROCEDURE — 93000 ELECTROCARDIOGRAM COMPLETE: CPT | Performed by: INTERNAL MEDICINE

## 2022-03-07 PROCEDURE — 99214 OFFICE O/P EST MOD 30 MIN: CPT | Performed by: INTERNAL MEDICINE

## 2022-03-07 NOTE — PATIENT INSTRUCTIONS
Plan:  1. Continue cardiac medications as prescribed. 2. Encourage continued activity as tolerated. 3. Weight loss management referral.   4. Follow up with EP NP in 6 months.

## 2022-03-07 NOTE — PROGRESS NOTES
[address]   Cardiac Follow Up    Date: 3/7/22  Patient Name: Gustavo Bartlett  YOB: 1950    Primary Care Physician: Reina Gibson MD    CHIEF COMPLAINT:   Chief Complaint   Patient presents with    6 Month Follow-Up    Atrial Fibrillation     HPI:  Gustavo Bartlett is a 66 y.o. male originally seen in 6/2018 for new onset AF, seen at PCP office. Has h/o obesity and untreated SAM, no cardiac history. ECG from 6/25/18 confirmed AF with controlled rate of 88 bpm.  He has been diagnosed with SAM, but cannot tolerate the CPAP. He has been sleeping in a recliner. Normal echo (7/17/18) with EF of 50-55%. On Xarelto for stroke prevention. Underwent CV on 8/16/18. He remained in sinus rhythm until his follow up visit 6/2019, he was noted to be in AF with a HR of 119. He then was started on Toprol 25mg once a day for rate control and underwent a CV on 6/25/2019. Rythmol was increased to 300mg every 8 hours on 10/29/2019. He underwent a cardioversion on 11/20/19. He was started on Sotalol 11/2019. His ECG on 12/26/19 showed AF HR 72. He remained short of breath and fatigued. He underwent RFCA for AF ablation on 1/22/2020 and was started on Tikosyn 1/23/2020. Echo on 3/12/2020 showed an EF of 55%. Patient's Tikosyn was discontinude in 4/28/2020. His cardiac event monitor 5/3/20-5/17/20 showed average HR 72 bpm () with 13% PACs, brief PAT, and no AF. He stated that he had no symptoms when he was wearing the cardiac event monitor. Wife stated that he is fatigued and his color is not good when he is in AF. 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 On 2/3/2021, patient reported has not had any recurrence of AF. He reported he has been able to be more active over the last few weeks because his sciatica has not been bothering his as much. He reports he has sciatica and is working on scheduling an appointment for an epidural. He hopes to be more active after this procedure. Today, patient reports he feels well. He reports he stays active at work and tolerate activity well. He reports he is slightly limited with bending due to chronic back pain. He reports he has occasional weakness in his legs while ascending stairs. He reports he has been having trouble losing weight. Patient is taking all cardiac medications as prescribed and tolerates them well. Patient denies current edema, chest pain, sob, palpitations, dizziness or syncope. Past Medical History:   has a past medical history of Arthritis, Atrial fibrillation ([address] Hiatal hernia, Hypertrophy of prostate without urinary obstruction and other lower urinary tract symptoms (LUTS), Kidney stone, Neoplasm of uncertain behavior of skin, Obesity, unspecified, and Organic sleep apnea, unspecified. Surgical History:   has a past surgical history that includes hernia repair; Tonsillectomy; Colonoscopy (04/01/2010); Lithotripsy (Right, 09/2016); other surgical history (Right, 09/25/2016); other surgical history (10/12/2016); ablation of dysrhythmic focus; Pain management procedure (Bilateral, 6/16/2020); and Pain management procedure (Right, 7/14/2020). Social History:   reports that he quit smoking about 34 years ago. He has a 45.00 pack-year smoking history. He has never used smokeless tobacco. He reports that he does not drink alcohol and does not use drugs. Family History:  family history includes Cancer in his maternal grandmother, maternal uncle, and mother; Emphysema in his father; Heart Failure in his maternal grandfather.      Home Medications:  Outpatient Encounter Medications as of 3/7/2022   Medication Sig Dispense Refill    rivaroxaban (XARELTO) 20 MG TABS tablet Take 1 tablet by mouth daily (with breakfast) 30 tablet 2    metoprolol succinate (TOPROL XL) 50 MG extended release tablet Take 1 tablet by mouth daily 30 tablet 6    acetaminophen (TYLENOL) 500 MG tablet Take 1 tablet by mouth 4 times daily as needed for Pain 120 tablet 0    [DISCONTINUED] metoprolol succinate (TOPROL XL) 50 MG extended release tablet Take 1 tablet by mouth daily 90 tablet 0    [DISCONTINUED] rivaroxaban (XARELTO) 20 MG TABS tablet One tablet by mouth daily with breakfast 30 tablet 5     No facility-administered encounter medications on file as of 3/7/2022. Allergies:  Bee venom and Adhesive tape     Review of Systems   Constitutional: Negative. HENT: Negative. Eyes: Negative. Respiratory: Negative. Cardiovascular: Negative. Gastrointestinal: Negative. Genitourinary: Negative. Musculoskeletal: Negative. Skin: Negative. Neurological: Negative. Hematological: Negative. Psychiatric/Behavioral: Negative. BP (!) 142/80   Pulse 69   Ht 5' 11\" (1.803 m)   Wt (!) 335 lb 8 oz (152.2 kg)   SpO2 97%   BMI 46.79 kg/m²     Data:     ECG 3/7/22: Personally reviewed. All current cardiac medications reviewed and adjusted accordingly  3/7/22    ECHO 3/2020:   Summary   Technically difficult examination due to body habitus. Definity® used for   myocardial border enhancement. Normal left ventricle size, wall thickness, and systolic function with a   visually estimated ejection fraction of 55%. Cannot exclude regional wall   motion abnormalities secondary to poor endocardial visualization. Normal left ventricular diastolic filling pressure. The aortic root is dilated at 4.2 cm. Mild aortic regurgitation.     7/17/18 Echo  Summary   Low normal left ventricular systolic function with an estimated ejection   fraction of 50-55%.   Normal left ventricular diastolic filling pressure.   Right ventricular systolic function is mildly reduced to low normal.   The right ventricle is mildly enlarged.   The right atrium is mildly dilated.   Mild mitral regurgitation.   Mild aortic regurgitation.   Mild tricuspid regurgitation.   Normal systolic pulmonary artery pressure (SPAP) estimated at 36 mmHg (RA   pressure 8 mmHg).   Mild pulmonic regurgitation. Objective:  Physical Exam   Constitutional: He is oriented to person, place, and time. He appears well-developed and well-nourished. HENT:   Head: Normocephalic and atraumatic. Eyes: Pupils are equal, round, and reactive to light. Neck: Normal range of motion. Cardiovascular: Normal rate, irregular rhythm and normal heart sounds. Pulmonary/Chest: Effort normal and breath sounds normal.   Abdominal: Soft. No tenderness. Musculoskeletal: Normal range of motion. He exhibits no edema. Neurological: He is alert and oriented to person, place, and time. Skin: Skin is warm and dry. Psychiatric: He has a normal mood and affect. Assessment:  1. AF-persistent. No AF since the PVI 1/22/20. He is not currently taking antiarrhythmic medication. 2. PACs wth RBBB- per ECG. 3. obesity    Plan:  1. Continue cardiac medications as prescribed. 2. Encourage continued activity as tolerated. 3. Weight loss management referral.   4. Follow up with EP NP in 6 months. This note has been scribed in the presence of Yaneli Ospina MD, by Ja Saucedo RN.     I, Dr. Yaneli Ospina, personally performed the services described in this documentation as scribed by Ja Saucedo RN  in my presence, and it is both accurate and complete.       Yaneli Ospina MD

## 2022-03-07 NOTE — LETTER
Mirna Cartagena  1950           [facility]   Cardiac Follow Up    Date: 3/7/22  Patient Name: Mirna Cartagena  YOB: 1950    Primary Care Physician: Cathleen Adler MD    CHIEF COMPLAINT:   Chief Complaint   Patient presents with    6 Month Follow-Up    Atrial Fibrillation     HPI:  Mirna Cartagena is a 66 y.o. male originally seen in 6/2018 for new onset AF, seen at PCP office. Has h/o obesity and untreated SAM, no cardiac history. ECG from 6/25/18 confirmed AF with controlled rate of 88 bpm.  He has been diagnosed with SAM, but cannot tolerate the CPAP. He has been sleeping in a recliner. Normal echo (7/17/18) with EF of 50-55%. On Xarelto for stroke prevention. Underwent CV on 8/16/18. He remained in sinus rhythm until his follow up visit 6/2019, he was noted to be in AF with a HR of 119. He then was started on Toprol 25mg once a day for rate control and underwent a CV on 6/25/2019. Rythmol was increased to 300mg every 8 hours on 10/29/2019. He underwent a cardioversion on 11/20/19. He was started on Sotalol 11/2019. His ECG on 12/26/19 showed AF HR 72. He remained short of breath and fatigued. He underwent RFCA for AF ablation on 1/22/2020 and was started on Tikosyn 1/23/2020. Echo on 3/12/2020 showed an EF of 55%. Patient's Tikosyn was discontinude in 4/28/2020. His cardiac event monitor 5/3/20-5/17/20 showed average HR 72 bpm () with 13% PACs, brief PAT, and no AF. He stated that he had no symptoms when he was wearing the cardiac event monitor. Wife stated that he is fatigued and his color is not good when he is in AF. 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 On 2/3/2021, patient reported has not had any recurrence of AF. He reported he has been able to be more active over the last few weeks because his sciatica has not been bothering his as much.  He reports he has sciatica and is working on scheduling an appointment for an epidural. He hopes to be more active after this procedure. Today, patient reports he feels well. He reports he stays active at work and tolerate activity well. He reports he is slightly limited with bending due to chronic back pain. He reports he has occasional weakness in his legs while ascending stairs. He reports he has been having trouble losing weight. Patient is taking all cardiac medications as prescribed and tolerates them well. Patient denies current edema, chest pain, sob, palpitations, dizziness or syncope. Past Medical History:   has a past medical history of Arthritis, Atrial fibrillation ([address] Hiatal hernia, Hypertrophy of prostate without urinary obstruction and other lower urinary tract symptoms (LUTS), Kidney stone, Neoplasm of uncertain behavior of skin, Obesity, unspecified, and Organic sleep apnea, unspecified. Surgical History:   has a past surgical history that includes hernia repair; Tonsillectomy; Colonoscopy (04/01/2010); Lithotripsy (Right, 09/2016); other surgical history (Right, 09/25/2016); other surgical history (10/12/2016); ablation of dysrhythmic focus; Pain management procedure (Bilateral, 6/16/2020); and Pain management procedure (Right, 7/14/2020). Social History:   reports that he quit smoking about 34 years ago. He has a 45.00 pack-year smoking history. He has never used smokeless tobacco. He reports that he does not drink alcohol and does not use drugs. Family History:  family history includes Cancer in his maternal grandmother, maternal uncle, and mother; Emphysema in his father; Heart Failure in his maternal grandfather.      Home Medications:  Outpatient Encounter Medications as of 3/7/2022   Medication Sig Dispense Refill    rivaroxaban (XARELTO) 20 MG TABS tablet Take 1 tablet by mouth daily (with breakfast) 30 tablet 2    metoprolol succinate (TOPROL XL) 50 MG extended release tablet Take 1 tablet by mouth daily 30 tablet 6    acetaminophen (TYLENOL) 500 MG tablet Take 1 tablet by mouth 4 times daily as needed for Pain 120 tablet 0    [DISCONTINUED] metoprolol succinate (TOPROL XL) 50 MG extended release tablet Take 1 tablet by mouth daily 90 tablet 0    [DISCONTINUED] rivaroxaban (XARELTO) 20 MG TABS tablet One tablet by mouth daily with breakfast 30 tablet 5     No facility-administered encounter medications on file as of 3/7/2022. Allergies:  Bee venom and Adhesive tape     Review of Systems   Constitutional: Negative. HENT: Negative. Eyes: Negative. Respiratory: Negative. Cardiovascular: Negative. Gastrointestinal: Negative. Genitourinary: Negative. Musculoskeletal: Negative. Skin: Negative. Neurological: Negative. Hematological: Negative. Psychiatric/Behavioral: Negative. BP (!) 142/80   Pulse 69   Ht 5' 11\" (1.803 m)   Wt (!) 335 lb 8 oz (152.2 kg)   SpO2 97%   BMI 46.79 kg/m²     Data:     ECG 3/7/22: Personally reviewed. All current cardiac medications reviewed and adjusted accordingly  3/7/22    ECHO 3/2020:   Summary   Technically difficult examination due to body habitus. Definity® used for   myocardial border enhancement. Normal left ventricle size, wall thickness, and systolic function with a   visually estimated ejection fraction of 55%. Cannot exclude regional wall   motion abnormalities secondary to poor endocardial visualization. Normal left ventricular diastolic filling pressure. The aortic root is dilated at 4.2 cm. Mild aortic regurgitation.     7/17/18 Echo  Summary   Low normal left ventricular systolic function with an estimated ejection   fraction of 50-55%.   Normal left ventricular diastolic filling pressure.   Right ventricular systolic function is mildly reduced to low normal.   The right ventricle is mildly enlarged.   The right atrium is mildly dilated.   Mild mitral regurgitation.   Mild aortic regurgitation.   Mild tricuspid regurgitation.   Normal systolic pulmonary artery pressure (SPAP) estimated at 36 mmHg (RA   pressure 8 mmHg).  Mild pulmonic regurgitation. Objective:  Physical Exam   Constitutional: He is oriented to person, place, and time. He appears well-developed and well-nourished. HENT:   Head: Normocephalic and atraumatic. Eyes: Pupils are equal, round, and reactive to light. Neck: Normal range of motion. Cardiovascular: Normal rate, irregular rhythm and normal heart sounds. Pulmonary/Chest: Effort normal and breath sounds normal.   Abdominal: Soft. No tenderness. Musculoskeletal: Normal range of motion. He exhibits no edema. Neurological: He is alert and oriented to person, place, and time. Skin: Skin is warm and dry. Psychiatric: He has a normal mood and affect. Assessment:  1. AF-persistent. No AF since the PVI 1/22/20. He is not currently taking antiarrhythmic medication. 2. PACs wt RBBB- per ECG. 3. obesity    Plan:  1. Continue cardiac medications as prescribed. 2. Encourage continued activity as tolerated. 3. Weight loss management referral.   4. Follow up with EP NP in 6 months. This note has been scribed in the presence of Carlos Wells MD, by Leyla Peres RN.     I, Dr. Carlos Wells, personally performed the services described in this documentation as scribed by Leyla Peres RN  in my presence, and it is both accurate and complete.       Carlos Wells MD

## 2022-04-12 RX ORDER — METOPROLOL SUCCINATE 50 MG/1
50 TABLET, EXTENDED RELEASE ORAL DAILY
Qty: 90 TABLET | Refills: 1 | Status: SHIPPED | OUTPATIENT
Start: 2022-04-12 | End: 2022-10-13 | Stop reason: SDUPTHER

## 2022-05-05 DIAGNOSIS — G89.29 CHRONIC MIDLINE LOW BACK PAIN WITHOUT SCIATICA: ICD-10-CM

## 2022-05-05 DIAGNOSIS — M54.50 CHRONIC MIDLINE LOW BACK PAIN WITHOUT SCIATICA: ICD-10-CM

## 2022-05-09 RX ORDER — HYDROCODONE BITARTRATE AND ACETAMINOPHEN 5; 325 MG/1; MG/1
1 TABLET ORAL EVERY 6 HOURS PRN
Qty: 20 TABLET | Refills: 0 | Status: SHIPPED | OUTPATIENT
Start: 2022-05-09 | End: 2022-05-16

## 2022-05-09 NOTE — TELEPHONE ENCOUNTER
Date of last refill of this med was 6/8/21, # of pills given 20 and # of refills given 0. Their next appointment is 5/12/22, the last date patient was seen was 3/7/22. Does patient have medication agreement on file? Yes  Has drug screen been done in last 12 months if needed?  N/A

## 2022-05-12 ENCOUNTER — OFFICE VISIT (OUTPATIENT)
Dept: FAMILY MEDICINE CLINIC | Age: 72
End: 2022-05-12
Payer: MEDICARE

## 2022-05-12 VITALS
BODY MASS INDEX: 44.1 KG/M2 | HEART RATE: 72 BPM | WEIGHT: 315 LBS | SYSTOLIC BLOOD PRESSURE: 122 MMHG | DIASTOLIC BLOOD PRESSURE: 86 MMHG | OXYGEN SATURATION: 97 % | HEIGHT: 71 IN

## 2022-05-12 DIAGNOSIS — R35.1 NOCTURIA: ICD-10-CM

## 2022-05-12 DIAGNOSIS — E66.01 CLASS 3 SEVERE OBESITY WITHOUT SERIOUS COMORBIDITY WITH BODY MASS INDEX (BMI) OF 45.0 TO 49.9 IN ADULT, UNSPECIFIED OBESITY TYPE (HCC): ICD-10-CM

## 2022-05-12 DIAGNOSIS — G89.29 CHRONIC MIDLINE LOW BACK PAIN WITHOUT SCIATICA: ICD-10-CM

## 2022-05-12 DIAGNOSIS — D48.5 NEOPLASM OF UNCERTAIN BEHAVIOR OF SKIN: ICD-10-CM

## 2022-05-12 DIAGNOSIS — M54.50 CHRONIC MIDLINE LOW BACK PAIN WITHOUT SCIATICA: ICD-10-CM

## 2022-05-12 DIAGNOSIS — G47.00 INSOMNIA, UNSPECIFIED TYPE: ICD-10-CM

## 2022-05-12 DIAGNOSIS — I48.0 PAROXYSMAL ATRIAL FIBRILLATION (HCC): Primary | ICD-10-CM

## 2022-05-12 PROCEDURE — 99214 OFFICE O/P EST MOD 30 MIN: CPT | Performed by: FAMILY MEDICINE

## 2022-05-12 RX ORDER — TRAZODONE HYDROCHLORIDE 50 MG/1
50 TABLET ORAL NIGHTLY
Qty: 90 TABLET | Refills: 1 | Status: SHIPPED | OUTPATIENT
Start: 2022-05-12

## 2022-05-12 ASSESSMENT — PATIENT HEALTH QUESTIONNAIRE - PHQ9
4. FEELING TIRED OR HAVING LITTLE ENERGY: 1
SUM OF ALL RESPONSES TO PHQ QUESTIONS 1-9: 2
3. TROUBLE FALLING OR STAYING ASLEEP: 1
SUM OF ALL RESPONSES TO PHQ QUESTIONS 1-9: 2
7. TROUBLE CONCENTRATING ON THINGS, SUCH AS READING THE NEWSPAPER OR WATCHING TELEVISION: 0
SUM OF ALL RESPONSES TO PHQ QUESTIONS 1-9: 2
8. MOVING OR SPEAKING SO SLOWLY THAT OTHER PEOPLE COULD HAVE NOTICED. OR THE OPPOSITE, BEING SO FIGETY OR RESTLESS THAT YOU HAVE BEEN MOVING AROUND A LOT MORE THAN USUAL: 0
1. LITTLE INTEREST OR PLEASURE IN DOING THINGS: 0
SUM OF ALL RESPONSES TO PHQ9 QUESTIONS 1 & 2: 0
9. THOUGHTS THAT YOU WOULD BE BETTER OFF DEAD, OR OF HURTING YOURSELF: 0
2. FEELING DOWN, DEPRESSED OR HOPELESS: 0
5. POOR APPETITE OR OVEREATING: 0
10. IF YOU CHECKED OFF ANY PROBLEMS, HOW DIFFICULT HAVE THESE PROBLEMS MADE IT FOR YOU TO DO YOUR WORK, TAKE CARE OF THINGS AT HOME, OR GET ALONG WITH OTHER PEOPLE: 0
SUM OF ALL RESPONSES TO PHQ QUESTIONS 1-9: 2
6. FEELING BAD ABOUT YOURSELF - OR THAT YOU ARE A FAILURE OR HAVE LET YOURSELF OR YOUR FAMILY DOWN: 0

## 2022-05-12 ASSESSMENT — ENCOUNTER SYMPTOMS
SHORTNESS OF BREATH: 0
BACK PAIN: 1

## 2022-05-12 NOTE — PATIENT INSTRUCTIONS
Patient Education        Piriformis Syndrome: Exercises  Introduction  Here are some examples of exercises for you to try. The exercises may be suggested for a condition or for rehabilitation. Start each exercise slowly. Ease off the exercises if you start to have pain. You will be told when to start these exercises and which ones will work bestfor you. How to do the exercises  Hip rotator stretch    1. Lie on your back with both knees bent and your feet flat on the floor. 2. Put the ankle of your affected leg on your opposite thigh near your knee. 3. Use your hand to gently push your knee (on your affected leg) away from your body until you feel a gentle stretch around your hip. 4. Hold the stretch for 15 to 30 seconds. 5. Repeat 2 to 4 times. 6. Switch legs and repeat steps 1 through 5. Piriformis stretch    1. Lie on your back with your legs straight. 2. Lift your affected leg and bend your knee. With your opposite hand, reach across your body, and then gently pull your knee toward your opposite shoulder. 3. Hold the stretch for 15 to 30 seconds. 4. Repeat with your other leg. 5. Repeat 2 to 4 times on each side. Lower abdominal strengthening    1. Lie on your back with your knees bent and your feet flat on the floor. 2. Tighten your belly muscles by pulling your belly button in toward your spine. 3. Lift one foot off the floor and bring your knee toward your chest, so that your knee is straight above your hip and your leg is bent like the letter \"L. \"  4. Lift the other knee up to the same position. 5. Lower one leg at a time to the starting position. 6. Keep alternating legs until you have lifted each leg 8 to 12 times. 7. Be sure to keep your belly muscles tight and your back still as you are moving your legs. Be sure to breathe normally. Follow-up care is a key part of your treatment and safety. Be sure to make and go to all appointments, and call your doctor if you are having problems.  It's also a good idea to know your test results and keep alist of the medicines you take. Current as of: July 1, 2021               Content Version: 13.2  © 2006-2022 Healthwise, Incorporated. Care instructions adapted under license by [facility] ([facility]). If you have questions about a medical condition or this instruction, always ask your healthcare professional. [address] any warranty or liability for your use of this information. Patient Education        Sciatica: Exercises  Introduction  Here are some examples of typical rehabilitation exercises for your condition. Start each exercise slowly. Ease off the exercise if you start to have pain. Your doctor or physical therapist will tell you when you can start theseexercises and which ones will work best for you. When you are not being active, find a comfortable position for rest. Some people are comfortable on the floor or a medium-firm bed with a small pillow under their head and another under their knees. Some people prefer to lie on their side with a pillow between their knees. Don't stay in one position fortoo long. Take short walks (10 to 20 minutes) every 2 to 3 hours. Avoid slopes, hills, and stairs until you feel better. Walk only distances you can manage withoutpain, especially leg pain. How to do the exercises  Back stretches    1. Get down on your hands and knees on the floor. 2. Relax your head and allow it to droop. Round your back up toward the ceiling until you feel a nice stretch in your upper, middle, and lower back. Hold this stretch for as long as it feels comfortable, or about 15 to 30 seconds. 3. Return to the starting position with a flat back while you are on your hands and knees. 4. Let your back sway by pressing your stomach toward the floor. Lift your buttocks toward the ceiling. 5. Hold this position for 15 to 30 seconds. 6. Repeat 2 to 4 times. Follow-up care is a key part of your treatment and safety.  Be sure to make and go to all appointments, and call your doctor if you are having problems. It's also a good idea to know your test results and keep alist of the medicines you take. Where can you learn more? Go to [email or web address] org and sign in to your [employer] account. Enter V422 in the [employer] box to learn more about \"Sciatica: Exercises. \"     If you do not have an account, please click on the \"Sign Up Now\" link. Current as of: July 1, 2021               Content Version: 13.2  © 3614-8195 Healthwise, Incorporated. Care instructions adapted under license by [facility] ([facility]). If you have questions about a medical condition or this instruction, always ask your healthcare professional. [address] any warranty or liability for your use of this information.

## 2022-05-12 NOTE — PROGRESS NOTES
Chief Complaint   Patient presents with    Check-Up       HPI:  Elena Rodriguez is a 66 y.o. (: 1950) here today   for check up. HPI  Hx of PAF. Last seen by cardio approx 2 mo ago. No changes made to medications at that time  Has not had any recent episodes of afib. Still taking xarelto and metoprolol. Ongoing issues w/ low back pain down left leg. Typically uses tylenol. Rarely using norco as well. Overall pain has been less lately. Aggravated by certain movements, activities, kenton if bending over too much. Hx of gilda. Has not been using cpap. Did not elizabet. Has issues w/ falling asleep at times. Few skin lesions noted to face. Ongoing issues w/ weight. Rare sugary beverages. Does drink a lot of water. Patient's medications, allergies, past medical, surgical, social and family histories were reviewed and updated as appropriate. ROS:  Review of Systems   Constitutional: Negative for fever. Respiratory: Negative for shortness of breath. Musculoskeletal: Positive for back pain. Psychiatric/Behavioral: Positive for sleep disturbance.            Microscopic Examination (no units)   Date Value   2018 YES     LDL Calculated (mg/dL)   Date Value   2016 90       Past Medical History:   Diagnosis Date    Arthritis     Atrial fibrillation ([facility] [address]     Hiatal hernia     Hypertrophy of prostate without urinary obstruction and other lower urinary tract symptoms (LUTS)     Kidney stone         Neoplasm of uncertain behavior of skin     Obesity, unspecified     Organic sleep apnea, unspecified        Family History   Problem Relation Age of Onset    Cancer Mother         breast met to bone    Emphysema Father     Cancer Maternal Uncle     Cancer Maternal Grandmother     Heart Failure Maternal Grandfather     Asthma Neg Hx     Diabetes Neg Hx     Hypertension Neg Hx        Social History     Socioeconomic History    Marital status:  Spouse name: Not on file    Number of children: Not on file    Years of education: Not on file    Highest education level: Not on file   Occupational History    Not on file   Tobacco Use    Smoking status: Former Smoker     Packs/day: 1.50     Years: 30.00     Pack years: 45.00     Quit date: 1988     Years since quittin.3    Smokeless tobacco: Never Used   Vaping Use    Vaping Use: Never used   Substance and Sexual Activity    Alcohol use: No     Alcohol/week: 0.0 standard drinks    Drug use: No    Sexual activity: Not on file   Other Topics Concern    Not on file   Social History Narrative    Not on file     Social Determinants of Health     Financial Resource Strain:     Difficulty of Paying Living Expenses: Not on file   Food Insecurity:     Worried About [address] in the Last Year: Not on file    [address] [employer] in the Last Year: Not on file   Transportation Needs:     Lack of Transportation (Medical): Not on file    Lack of Transportation (Non-Medical):  Not on file   Physical Activity:     Days of Exercise per Week: Not on file    Minutes of Exercise per Session: Not on file   Stress:     Feeling of Stress : Not on file   Social Connections:     Frequency of Communication with Friends and Family: Not on file    Frequency of Social Gatherings with Friends and Family: Not on file    Attends Sikhism Services: Not on file    Active Member of [address] [place] [address] or Organizations: Not on file    Attends Club or Organization Meetings: Not on file    Marital Status: Not on file   Intimate Partner Violence:     Fear of Current or Ex-Partner: Not on file    Emotionally Abused: Not on file    Physically Abused: Not on file    Sexually Abused: Not on file   Housing Stability:     Unable to Pay for Housing in the Last Year: Not on file    Number of [place] in the Last Year: Not on file    Unstable Housing in the Last Year: Not on file       Prior to Visit Medications Medication Sig Taking? Authorizing Provider   traZODone (DESYREL) 50 MG tablet Take 1 tablet by mouth nightly Yes Mina Denver, MD   HYDROcodone-acetaminophen (NORCO) 5-325 MG per tablet Take 1 tablet by mouth every 6 hours as needed for Pain for up to 7 days. Yes Mina Denver, MD   metoprolol succinate (TOPROL XL) 50 MG extended release tablet Take 1 tablet by mouth daily Yes Natalie Barron MD   rivaroxaban (XARELTO) 20 MG TABS tablet Take 1 tablet by mouth daily (with breakfast) Yes Natalie Barron MD   acetaminophen (TYLENOL) 500 MG tablet Take 1 tablet by mouth 4 times daily as needed for Pain Yes Julia Daley PA-C       Allergies   Allergen Reactions    Bee Venom     Adhesive Tape Rash       OBJECTIVE:    /86   Pulse 72   Ht 5' 11\" (1.803 m)   Wt (!) 331 lb 3.2 oz (150.2 kg)   SpO2 97%   BMI 46.19 kg/m²     BP Readings from Last 2 Encounters:   05/12/22 122/86   03/07/22 (!) 142/80       Wt Readings from Last 3 Encounters:   05/12/22 (!) 331 lb 3.2 oz (150.2 kg)   03/07/22 (!) 335 lb 8 oz (152.2 kg)   08/19/21 (!) 323 lb 12.8 oz (146.9 kg)       Physical Exam  Constitutional:       Appearance: He is obese. HENT:      Head: Normocephalic and atraumatic. Eyes:      Extraocular Movements: Extraocular movements intact. Cardiovascular:      Rate and Rhythm: Normal rate and regular rhythm. Extrasystoles are present. Pulmonary:      Effort: Pulmonary effort is normal.      Breath sounds: Decreased breath sounds present. Skin:     General: Skin is warm and dry. Neurological:      General: No focal deficit present. Mental Status: He is alert and oriented to person, place, and time. Psychiatric:         Mood and Affect: Mood normal.         Behavior: Behavior normal.     pain to left SI region noted on exam.      Skin lesions to face noted. Melody to left side of face and forehead. Other skin lesions noted. ASSESSMENT/PLAN:    1.  Paroxysmal atrial fibrillation ([facility]  Rpt labs at [facility]. F/u w/ cardio as sched. - Lipid Panel; Future  - Comprehensive Metabolic Panel; Future  - TSH; Future  - T4, Free; Future    2. Chronic midline low back pain without sciatica  Near baseline. Melody to left SI region. 3. Nocturia  Rpt labs. - PSA, Prostatic Specific Antigen; Future    4. Class 3 severe obesity without serious comorbidity with body mass index (BMI) of 45.0 to 49.9 in adult, unspecified obesity type ([facility]  Cont to work on weight loss. - Lipid Panel; Future    5. Insomnia, unspecified type  Trial of prn meds as listed. - traZODone (DESYREL) 50 MG tablet; Take 1 tablet by mouth nightly  Dispense: 90 tablet; Refill: 1    6. Neoplasm of uncertain behavior of skin  Lesion to left side of face and forehead.   rec derm referral due to lesions and need for general skin exam.   - [facility] - Ama Kwon DO, Dermatology, [facility]

## 2022-05-17 ENCOUNTER — NURSE ONLY (OUTPATIENT)
Dept: FAMILY MEDICINE CLINIC | Age: 72
End: 2022-05-17
Payer: MEDICARE

## 2022-05-17 DIAGNOSIS — E66.01 CLASS 3 SEVERE OBESITY WITHOUT SERIOUS COMORBIDITY WITH BODY MASS INDEX (BMI) OF 45.0 TO 49.9 IN ADULT, UNSPECIFIED OBESITY TYPE (HCC): ICD-10-CM

## 2022-05-17 DIAGNOSIS — R35.1 NOCTURIA: ICD-10-CM

## 2022-05-17 DIAGNOSIS — I48.0 PAROXYSMAL ATRIAL FIBRILLATION (HCC): ICD-10-CM

## 2022-05-17 PROCEDURE — 36415 COLL VENOUS BLD VENIPUNCTURE: CPT | Performed by: FAMILY MEDICINE

## 2022-05-17 NOTE — PROGRESS NOTES
Blood drawn per order. Needle size: 23 g  Site: L Antecubital.  First attempt successful No    Second attempt yes, L Hand    Pressure applied until bleeding stopped. Pressure applied. Patient informed to call office or return if bleeding reoccurs and unable to stop.     Tubes drawn: 0 purple     1 red

## 2022-05-18 LAB
A/G RATIO: 1.4 (ref 1.1–2.2)
ALBUMIN SERPL-MCNC: 3.8 G/DL (ref 3.4–5)
ALP BLD-CCNC: 89 U/L (ref 40–129)
ALT SERPL-CCNC: 21 U/L (ref 10–40)
ANION GAP SERPL CALCULATED.3IONS-SCNC: 10 MMOL/L (ref 3–16)
AST SERPL-CCNC: 17 U/L (ref 15–37)
BILIRUB SERPL-MCNC: 0.3 MG/DL (ref 0–1)
BUN BLDV-MCNC: 17 MG/DL (ref 7–20)
CALCIUM SERPL-MCNC: 8.5 MG/DL (ref 8.3–10.6)
CHLORIDE BLD-SCNC: 107 MMOL/L (ref 99–110)
CHOLESTEROL, TOTAL: 149 MG/DL (ref 0–199)
CO2: 24 MMOL/L (ref 21–32)
CREAT SERPL-MCNC: 1.1 MG/DL (ref 0.8–1.3)
GFR AFRICAN AMERICAN: >60
GFR NON-AFRICAN AMERICAN: >60
GLUCOSE BLD-MCNC: 112 MG/DL (ref 70–99)
HDLC SERPL-MCNC: 36 MG/DL (ref 40–60)
LDL CHOLESTEROL CALCULATED: 98 MG/DL
POTASSIUM SERPL-SCNC: 4.1 MMOL/L (ref 3.5–5.1)
PROSTATE SPECIFIC ANTIGEN: 1.38 NG/ML (ref 0–4)
SODIUM BLD-SCNC: 141 MMOL/L (ref 136–145)
T4 FREE: 1 NG/DL (ref 0.9–1.8)
TOTAL PROTEIN: 6.5 G/DL (ref 6.4–8.2)
TRIGL SERPL-MCNC: 77 MG/DL (ref 0–150)
TSH SERPL DL<=0.05 MIU/L-ACNC: 2.2 UIU/ML (ref 0.27–4.2)
VLDLC SERPL CALC-MCNC: 15 MG/DL

## 2022-06-14 ENCOUNTER — TELEPHONE (OUTPATIENT)
Dept: CARDIOLOGY CLINIC | Age: 72
End: 2022-06-14

## 2022-06-14 NOTE — TELEPHONE ENCOUNTER
Pt called into office requesting samples of Xarelto 20 mg. Please check office and call pt once we are able to set some aside.      LOV 03/07/2022  LAB 05/17/2022  NOV 09/13/2022

## 2022-06-14 NOTE — TELEPHONE ENCOUNTER
Patient informed, we do not have any samples her at [facility]. I told patient I would try tomorrow to see if the [facility] office has any samples and then let him know.

## 2022-06-16 NOTE — TELEPHONE ENCOUNTER
Pt called to see if we have reached out to the [place] office to see if they have samples of Xarelto 20mg. Please advise.

## 2022-06-16 NOTE — TELEPHONE ENCOUNTER
Spoke with pt, informed pt that we have set aside samples of xarelto for pt to  at [facility]. Pt v/u.

## 2022-07-11 ENCOUNTER — TELEPHONE (OUTPATIENT)
Dept: FAMILY MEDICINE CLINIC | Age: 72
End: 2022-07-11

## 2022-07-11 ENCOUNTER — SCHEDULED TELEPHONE ENCOUNTER (OUTPATIENT)
Dept: FAMILY MEDICINE CLINIC | Age: 72
End: 2022-07-11
Payer: MEDICARE

## 2022-07-11 DIAGNOSIS — J02.9 SORE THROAT: Primary | ICD-10-CM

## 2022-07-11 DIAGNOSIS — R50.9 FEVER, UNSPECIFIED FEVER CAUSE: ICD-10-CM

## 2022-07-11 PROCEDURE — 99442 PR PHYS/QHP TELEPHONE EVALUATION 11-20 MIN: CPT | Performed by: FAMILY MEDICINE

## 2022-07-11 ASSESSMENT — ENCOUNTER SYMPTOMS
DIARRHEA: 1
COUGH: 1

## 2022-07-11 NOTE — PROGRESS NOTES
Sandra Infante is a 66 y.o. male evaluated via telephone on 7/11/2022. Consent:  He and/or health care decision maker is aware that that he may receive a bill for this telephone service, depending on his insurance coverage, and has provided verbal consent to proceed: Yes      Documentation:  I communicated with the patient and/or health care decision maker about see above and below. Details of this discussion including any medical advice provided: see above and below    Pharyngitis  This is a new problem. The current episode started in the past 7 days (began on saturday). The problem has been unchanged. Associated symptoms include coughing and a fever (101 on saturday). Pertinent negatives include no congestion. Diarrhea   Associated symptoms include coughing and a fever (101 on saturday). began on Saturday night. Couple of episodes today. Overall fairly  Sig urinary freq noted as well. Has not been drinking much. 1. Sore throat  Given the severity of his symptoms and the acute onset, I suspect COVID. Order for testing placed. I did discuss paxlovid with the patient. I also discussed symptomatic treatment. If COVID test is negative, could consider seeing him here in the office or treating him empirically for a different bacterial infection. Will await test results and decide how to proceed. He did have an exposure to [address]; Future    2. Fever, unspecified fever cause  See above  - COVID-19; Future       I affirm this is a Patient Initiated Episode with an Established Patient who has not had a related appointment within my department in the past 7 days or scheduled within the next 24 hours.     Total Time: minutes: 11-20 minutes    Note: not billable if this call serves to triage the patient into an appointment for the relevant concern      Rodney Zapata MD

## 2022-07-12 LAB
INTERNAL QC: NORMAL
SARS-COV-2, NAA: POSITIVE

## 2022-07-13 ENCOUNTER — TELEMEDICINE (OUTPATIENT)
Dept: FAMILY MEDICINE CLINIC | Age: 72
End: 2022-07-13
Payer: MEDICARE

## 2022-07-13 DIAGNOSIS — Z00.00 MEDICARE ANNUAL WELLNESS VISIT, SUBSEQUENT: Primary | ICD-10-CM

## 2022-07-13 PROCEDURE — 1123F ACP DISCUSS/DSCN MKR DOCD: CPT | Performed by: FAMILY MEDICINE

## 2022-07-13 PROCEDURE — G0439 PPPS, SUBSEQ VISIT: HCPCS | Performed by: FAMILY MEDICINE

## 2022-07-13 SDOH — ECONOMIC STABILITY: FOOD INSECURITY: WITHIN THE PAST 12 MONTHS, YOU WORRIED THAT YOUR FOOD WOULD RUN OUT BEFORE YOU GOT MONEY TO BUY MORE.: NEVER TRUE

## 2022-07-13 SDOH — ECONOMIC STABILITY: FOOD INSECURITY: WITHIN THE PAST 12 MONTHS, THE FOOD YOU BOUGHT JUST DIDN'T LAST AND YOU DIDN'T HAVE MONEY TO GET MORE.: NEVER TRUE

## 2022-07-13 ASSESSMENT — PATIENT HEALTH QUESTIONNAIRE - PHQ9
2. FEELING DOWN, DEPRESSED OR HOPELESS: 0
1. LITTLE INTEREST OR PLEASURE IN DOING THINGS: 0
SUM OF ALL RESPONSES TO PHQ QUESTIONS 1-9: 0
SUM OF ALL RESPONSES TO PHQ9 QUESTIONS 1 & 2: 0
SUM OF ALL RESPONSES TO PHQ QUESTIONS 1-9: 0

## 2022-07-13 ASSESSMENT — SOCIAL DETERMINANTS OF HEALTH (SDOH): HOW HARD IS IT FOR YOU TO PAY FOR THE VERY BASICS LIKE FOOD, HOUSING, MEDICAL CARE, AND HEATING?: NOT HARD AT ALL

## 2022-07-13 ASSESSMENT — LIFESTYLE VARIABLES: HOW OFTEN DO YOU HAVE A DRINK CONTAINING ALCOHOL: NEVER

## 2022-07-13 NOTE — PROGRESS NOTES
Medicare Annual Wellness Visit    Sandra Howard is here for Medicare AWV    Assessment & Plan   Medicare annual wellness visit, subsequent      Recommendations for Preventive Services Due: see orders and patient instructions/AVS.  Recommended screening schedule for the next 5-10 years is provided to the patient in written form: see Patient Instructions/AVS.     No follow-ups on file. Subjective       Patient's complete Health Risk Assessment and screening values have been reviewed and are found in Flowsheets. The following problems were reviewed today and where indicated follow up appointments were made and/or referrals ordered.     Positive Risk Factor Screenings with Interventions:               General Health and ACP:  General  In general, how would you say your health is?: Very Good  In the past 7 days, have you experienced any of the following: New or Increased Pain, New or Increased Fatigue, Loneliness, Social Isolation, Stress or Anger?: (!) Yes  Select all that apply: (!) New or Increased Pain,New or Increased Fatigue (Patient currently has covid)  Do you get the social and emotional support that you need?: Yes  Do you have a Living Will?: (!) No    Advance Directives     Power of  Living Will ACP-Advance Directive ACP-Power of     Not on File Not on File Not on File Not on File      General Health Risk Interventions:  · No Living Will: Patient declines ACP discussion/assistance    Health Habits/Nutrition:     Physical Activity: Insufficiently Active    Days of Exercise per Week: 2 days    Minutes of Exercise per Session: 30 min     Have you lost any weight without trying in the past 3 months?: No     Have you seen the dentist within the past year?: (!) No    Health Habits/Nutrition Interventions:  · Dental exam overdue:  patient encouraged to make appointment with his/her dentist    Hearing/Vision:  Do you or your family notice any trouble with your hearing that hasn't been managed with hearing aids?: (!) Yes  Do you have difficulty driving, watching TV, or doing any of your daily activities because of your eyesight?: No  Have you had an eye exam within the past year?: Yes  No exam data present    Safety:  Do you have working smoke detectors?: Yes  Do you have any tripping hazards - loose or unsecured carpets or rugs?: (!) Yes  Do you have any tripping hazards - clutter in doorways, halls, or stairs?: No  Do you have either shower bars, grab bars, non-slip mats or non-slip surfaces in your shower or bathtub?: Yes  Do all of your stairways have a railing or banister?: Yes  Do you always fasten your seatbelt when you are in a car?: Yes           Objective      Patient-Reported Vitals  Patient-Reported Systolic (Top): 985 mmHg  Patient-Reported Diastolic (Bottom): 82 mmHg  Patient-Reported Pulse: 72  Patient-Reported Weight: 324 lbs              Allergies   Allergen Reactions    Bee Venom     Adhesive Tape Rash     Prior to Visit Medications    Medication Sig Taking? Authorizing Provider   nirmatrelvir/ritonavir (PAXLOVID) 20 x 150 MG & 10 x 100MG Take 3 tablets (two 150 mg nirmatrelvir and one 100 mg ritonavir tablets) by mouth every 12 hours for 5 days.   CHARISSE Tello - CNP   traZODone (DESYREL) 50 MG tablet Take 1 tablet by mouth nightly  Alicia Hankins MD   metoprolol succinate (TOPROL XL) 50 MG extended release tablet Take 1 tablet by mouth daily  Chandrakant Nazario MD   rivaroxaban (XARELTO) 20 MG TABS tablet Take 1 tablet by mouth daily (with breakfast)  Chandrakant Nazario MD   acetaminophen (TYLENOL) 500 MG tablet Take 1 tablet by mouth 4 times daily as needed for Pain  John Darden PA-C       CareTe (Including outside providers/suppliers regularly involved in providing care):   Patient Care Team:  Alicia Hankins MD as PCP - General  Alicia Hankins MD as PCP - [facility] Empaneled Provider  Marquis Bridger MD as Consulting Physician (Pulmonology)     Reviewed and updated this visit:  Tobacco  Allergies  Meds  Med Hx  Surg Hx  Soc Hx  Fam Hx           Stanley Worrell, was evaluated through a synchronous (real-time) telephone encounter. The patient (or guardian if applicable) is aware that this is a billable service. Verbal consent to proceed has been obtained within the past 12 months. The visit was conducted pursuant to the emergency declaration under the [address] [place] [address] authority and the [employer] General Act. Patient identification was verified, and a caregiver was present when appropriate. The patient was located in a state where the provider was credentialed to provide care. --Imani Zaragoza LPN on 9/18/6710 at 2:11 AM    An electronic signature was used to authenticate this note. José Miguel Ambrocio LPN, 8/43/1511, performed the documented evaluation under the direct supervision of the attending physician. This encounter was performed under Luke cristina MDs, direct supervision, 7/13/2022.

## 2022-07-13 NOTE — PATIENT INSTRUCTIONS
Personalized Preventive Plan for Megan Esters - 7/13/2022  Medicare offers a range of preventive health benefits. Some of the tests and screenings are paid in full while other may be subject to a deductible, co-insurance, and/or copay. Some of these benefits include a comprehensive review of your medical history including lifestyle, illnesses that may run in your family, and various assessments and screenings as appropriate. After reviewing your medical record and screening and assessments performed today your provider may have ordered immunizations, labs, imaging, and/or referrals for you. A list of these orders (if applicable) as well as your Preventive Care list are included within your After Visit Summary for your review. Other Preventive Recommendations:    · A preventive eye exam performed by an eye specialist is recommended every 1-2 years to screen for glaucoma; cataracts, macular degeneration, and other eye disorders. · A preventive dental visit is recommended every 6 months. · Try to get at least 150 minutes of exercise per week or 10,000 steps per day on a pedometer . · Order or download the FREE \"Exercise & Physical Activity: Your Everyday Guide\" from [employer]. Call 3-192.776.1294 or search [employer] online. · You need 8282-7378 mg of calcium and 7971-5283 IU of vitamin D per day. It is possible to meet your calcium requirement with diet alone, but a vitamin D supplement is usually necessary to meet this goal.  · When exposed to the sun, use a sunscreen that protects against both UVA and UVB radiation with an SPF of 30 or greater. Reapply every 2 to 3 hours or after sweating, drying off with a towel, or swimming. · Always wear a seat belt when traveling in a car. Always wear a helmet when riding a bicycle or motorcycle.

## 2022-08-10 ENCOUNTER — TELEPHONE (OUTPATIENT)
Dept: CARDIOLOGY CLINIC | Age: 72
End: 2022-08-10

## 2022-08-11 NOTE — TELEPHONE ENCOUNTER
Pt came into [address] [place] [address] stating to come here to see if we have Xarelto 20mg samples.      Pt was given some samples

## 2022-09-13 ENCOUNTER — OFFICE VISIT (OUTPATIENT)
Dept: CARDIOLOGY CLINIC | Age: 72
End: 2022-09-13
Payer: MEDICARE

## 2022-09-13 ENCOUNTER — TELEPHONE (OUTPATIENT)
Dept: CARDIOLOGY CLINIC | Age: 72
End: 2022-09-13

## 2022-09-13 VITALS
HEIGHT: 71 IN | OXYGEN SATURATION: 98 % | BODY MASS INDEX: 44.1 KG/M2 | DIASTOLIC BLOOD PRESSURE: 86 MMHG | WEIGHT: 315 LBS | SYSTOLIC BLOOD PRESSURE: 142 MMHG | HEART RATE: 79 BPM

## 2022-09-13 DIAGNOSIS — I49.1 PAC (PREMATURE ATRIAL CONTRACTION): ICD-10-CM

## 2022-09-13 DIAGNOSIS — I48.91 ATRIAL FIBRILLATION, UNSPECIFIED TYPE (HCC): Primary | ICD-10-CM

## 2022-09-13 DIAGNOSIS — I47.1 PAT (PAROXYSMAL ATRIAL TACHYCARDIA) (HCC): ICD-10-CM

## 2022-09-13 PROCEDURE — 93000 ELECTROCARDIOGRAM COMPLETE: CPT | Performed by: NURSE PRACTITIONER

## 2022-09-13 PROCEDURE — 1123F ACP DISCUSS/DSCN MKR DOCD: CPT | Performed by: NURSE PRACTITIONER

## 2022-09-13 PROCEDURE — 99214 OFFICE O/P EST MOD 30 MIN: CPT | Performed by: NURSE PRACTITIONER

## 2022-09-13 NOTE — PATIENT INSTRUCTIONS
No changes today     Update labs     Event monitor to evaluate heart rate control     Monitor BP at home and call if consistently out of goal ranges    Follow up in 4 months

## 2022-09-13 NOTE — TELEPHONE ENCOUNTER
Monitor placed by [facility]   Length of monitor 48 Hours   Monitor ordered by DIXIE SPAIN Hankins   Serial number [id number]  Activation successful prior to pt leaving office?  Yes

## 2022-09-13 NOTE — PROGRESS NOTES
[address]   Electrophysiology Outpatient Note              Date:  September 13, 2022  Patient name: Gayle Doyle  YOB: 1950    Primary Care physician: Leonela Vann MD    HISTORY OF PRESENT ILLNESS: The patient is a 74 y.o.  male with a history of AF, nonobstructive CAD, obesity and untreated sleep apnea   In 6/2018, he had food poisoning and diarrhea x1 week and was found to be in atrial fibrillation by his PCP. In 8/2018, he underwent cardioversion. In 11/2018, atrial fibrillation was recurrent. In 4/2019, he had a low risk stress test and Rythmol was started. In 6/2019, patient underwent cardioversion but was found to be back in atrial fibrillation in 8/2019. In 11/2019, he was admitted to start sotalol and underwent successful cardioversion. In 12/2019, he had returned to symptomatic atrial fibrillation. In 1/2020, he had an extensive RFCA of atrial fibrillation and Tikosyn was started. In 3/2020, he complained of chest pain and cardiac CTA showed nonobstructive CAD. In 3/2020, echo showed an EF of 55%. In 4/2020, Tikosyn was stopped. In 5/2020, event monitor showed PACs and PAT. Today he is being seen for AF. EKG shows AF with a HR of 85. Patient has no complaints. He has been feeling better than usual. He has been active around his home. He is surprised to hear that he is in AF today. He is unsure how long he has been out of rhythm. Denies chest pain, palpitations, shortness of breath, and dizziness. Past Medical History:   has a past medical history of Arthritis, Atrial fibrillation ([address] Hiatal hernia, Hypertrophy of prostate without urinary obstruction and other lower urinary tract symptoms (LUTS), Kidney stone, Neoplasm of uncertain behavior of skin, Obesity, unspecified, and Organic sleep apnea, unspecified. Past Surgical History:   has a past surgical history that includes hernia repair; Tonsillectomy; Colonoscopy (04/01/2010);  Lithotripsy (Right, 09/2016); other surgical history (Right, 09/25/2016); other surgical history (10/12/2016); ablation of dysrhythmic focus; Pain management procedure (Bilateral, 6/16/2020); and Pain management procedure (Right, 7/14/2020). Home Medications:    Prior to Admission medications    Medication Sig Start Date End Date Taking? Authorizing Provider   traZODone (DESYREL) 50 MG tablet Take 1 tablet by mouth nightly 5/12/22  Yes Kirstin May MD   metoprolol succinate (TOPROL XL) 50 MG extended release tablet Take 1 tablet by mouth daily 4/12/22  Yes Ashish Best MD   rivaroxaban (XARELTO) 20 MG TABS tablet Take 1 tablet by mouth daily (with breakfast) 1/18/22  Yes Ashish Best MD   acetaminophen (TYLENOL) 500 MG tablet Take 1 tablet by mouth 4 times daily as needed for Pain 1/24/20  Yes Polina Castro PA-C       Allergies:  Bee venom and Adhesive tape    Social History:   reports that he quit smoking about 34 years ago. He has a 45.00 pack-year smoking history. He has never used smokeless tobacco. He reports that he does not drink alcohol and does not use drugs. Family History: family history includes Cancer in his maternal grandmother, maternal uncle, and mother; Emphysema in his father; Heart Failure in his maternal grandfather. All 14 point review of systems are completed and pertinent positives are mentioned in the history of present illness. Other systems are reviewed and are negative. PHYSICAL EXAM:    Vital signs:    BP (!) 142/86   Pulse 79   Ht 5' 11\" (1.803 m)   Wt (!) 325 lb (147.4 kg)   SpO2 98%   BMI 45.33 kg/m²      Constitutional and general appearance: alert, cooperative, no distress, and appears stated age  HEENT: PERRL, no cervical lymphadenopathy. No masses palpable.  Normal oral mucosa  Respiratory:  Normal excursion and expansion without use of accessory muscles  Resp auscultation: Normal breath sounds without wheezing, rhonchi, and rales  Cardiovascular: The apical impulse is not displaced  Heart tones are crisp and normal. irregular S1 and S2.  Jugular venous pulsation Normal  The carotid upstroke is normal in amplitude and contour without delay or bruit  Peripheral pulses are symmetrical and full   Abdomen:  No masses or tenderness  Bowel sounds present  Extremities:   No cyanosis or clubbing   No lower extremity edema   Skin: warm and dry  Neurological:  Alert and oriented  Moves all extremities well  No abnormalities of mood, affect, memory, mentation, or behavior are noted    DATA:    ECG 9/13/2022:  AF 85 bpm        Echo 7/17/2018:  Low normal left ventricular systolic function with an estimated ejection  fraction of 50-55%. Normal left ventricular diastolic filling pressure. Right ventricular systolic function is mildly reduced to low normal.  The right ventricle is mildly enlarged. The right atrium is mildly dilated. Mild mitral regurgitation. Mild aortic regurgitation. Mild tricuspid regurgitation. Normal systolic pulmonary artery pressure (SPAP) estimated at 36 mmHg (RA pressure 8 mmHg). Mild pulmonic regurgitation. Stress 4/11/2019: Abnormal low risk myocardial perfusion study. Cannot exclude small area of ischemia within the apical segment. Normal left ventricular size, wall motion, and function. The estimated left ventricular function is 64%. All labs and testing reviewed. CARDIOLOGY LABS:   CBC: No results for input(s): WBC, HGB, HCT, PLT in the last 72 hours. BMP: No results for input(s): NA, K, CO2, BUN, CREATININE, LABGLOM, GLUCOSE in the last 72 hours. PT/INR: No results for input(s): PROTIME, INR in the last 72 hours. APTT:No results for input(s): APTT in the last 72 hours.   FASTING LIPID PANEL:  Lab Results   Component Value Date/Time    HDL 36 05/17/2022 08:04 AM    LDLCALC 98 05/17/2022 08:04 AM    TRIG 77 05/17/2022 08:04 AM     LIVER PROFILE:No results for input(s): AST, ALT, ALB in the last 72 hours. IMPRESSION:    Patient Active Problem List   Diagnosis    Kidney stone    Obesity    Organic sleep apnea    Back pain    Morbid obesity with BMI of 40.0-44.9, adult (HCC)    Renal colic on right side    Paroxysmal atrial fibrillation (HCC)    Cellulitis and abscess of buttock    Chest pain       Assessment:   Atrial fibrillation of unknown duration: stable, asymptomatic    -[id number] score 1 (age)   -failed sotalol    -s/p multiple CV (8/2018, 11/2018, 6/2019, 11/2019)   -s/p RFCA 1/2020 (Nikia Bladimir started post procedure and stopped 4/2020)  PAT: stable, noted on event monitor 5/2020  PAC's: stable, noted on event monitor 5/2020  RBBB: stable   Nonobstructive CAD on CTA 3/2020  SAM: intolerant of CPAP  Obesity       Plan:   1. Continue Xarelto and Toprol   2. CBC due now   3. BMP due 5/2023  4. Event monitor to evaluate HR control back in AF  5. Patient is asymptomatic in rate controlled AF today. If event monitor shows uncontrolled rates or patient develops symptoms, can reconsider repeat ablation and/or resuming Tikosyn. However,at this time patient is satisfied with rate control and stroke protection as he is feeling well.    6. Follow up in 4 months      MDM lucille Figueroa, CHARISSE-CNP  [address]  [phone]

## 2022-09-27 PROCEDURE — 93228 REMOTE 30 DAY ECG REV/REPORT: CPT | Performed by: NURSE PRACTITIONER

## 2022-10-04 NOTE — PROGRESS NOTES
Chief Complaint   Patient presents with    Cough     chest congestion for 1 week        /84   Pulse 83   Temp 97.6 °F (36.4 °C)   Wt (!) 324 lb (147 kg)   SpO2 98%   BMI 46.49 kg/m²     HPI:  Francois Menendez is a 65 y.o. (: 1950) here today   for   Cough  This is a new problem. The current episode started in the past 7 days. The problem has been waxing and waning. The problem occurs every few minutes. The cough is productive of purulent sputum. Associated symptoms include headaches, nasal congestion, postnasal drip and rhinorrhea. Pertinent negatives include no chest pain, chills, ear congestion, ear pain, eye redness, fever, heartburn, hemoptysis, myalgias, rash, sore throat, shortness of breath, sweats, weight loss or wheezing. Nothing aggravates the symptoms. Treatments tried: tessalon pearls. The treatment provided no relief. There is no history of environmental allergies. He has not had COVID or the vaccines. Alex Ferguson is sick and he has been around him. Patient's medications, allergies, past medical, surgical, social and family histories were reviewed and updated as appropriate. ROS:  Review of Systems   Constitutional: Positive for activity change. Negative for appetite change, chills, diaphoresis, fatigue, fever, unexpected weight change and weight loss. HENT: Positive for postnasal drip and rhinorrhea. Negative for congestion, ear discharge, ear pain, hearing loss, nosebleeds, sinus pressure, sinus pain, sneezing, sore throat, tinnitus, trouble swallowing and voice change. Eyes: Negative for photophobia, pain, discharge, redness and itching. Respiratory: Positive for cough. Negative for hemoptysis, choking, chest tightness, shortness of breath, wheezing and stridor. Cardiovascular: Negative for chest pain, palpitations and leg swelling. Gastrointestinal: Positive for diarrhea (1X).  Negative for abdominal pain, blood in stool, constipation, heartburn, nausea and vomiting. Endocrine: Negative for cold intolerance, heat intolerance, polydipsia and polyuria. Genitourinary: Negative for difficulty urinating, dysuria, enuresis, flank pain, frequency, hematuria and urgency. Musculoskeletal: Negative for back pain, gait problem, joint swelling, myalgias, neck pain and neck stiffness. Skin: Negative for color change, pallor, rash and wound. Allergic/Immunologic: Negative for environmental allergies and food allergies. Neurological: Positive for headaches. Negative for dizziness, tremors, syncope, speech difficulty, weakness, light-headedness and numbness. Hematological: Negative for adenopathy. Does not bruise/bleed easily. Psychiatric/Behavioral: Negative for agitation, behavioral problems, confusion, decreased concentration, dysphoric mood, hallucinations, self-injury, sleep disturbance and suicidal ideas. The patient is not nervous/anxious and is not hyperactive. Prior to Visit Medications    Medication Sig Taking? Authorizing Provider   methylPREDNISolone (MEDROL DOSEPACK) 4 MG tablet Take by mouth. Yes CHARISSE Kelley CNP   amoxicillin-clavulanate (AUGMENTIN) 875-125 MG per tablet Take 1 tablet by mouth 2 times daily for 10 days Yes CHARISSE Kelley CNP   Dextromethorphan-guaiFENesin  MG/5ML SYRP Take 5-10 mLs by mouth every 6 hours as needed for Cough Yes CHARISSE Kelley CNP   metoprolol succinate (TOPROL XL) 50 MG extended release tablet Take 1 tablet by mouth daily Yes Lena Gamez MD   HYDROcodone-acetaminophen (NORCO) 5-325 MG per tablet Take 1 tablet by mouth every 6 hours as needed for Pain for up to 7 days.  Yes Jayden Ji MD   benzonatate (TESSALON) 200 MG capsule Take 1 capsule by mouth 3 times daily as needed for Cough Yes Jayden Ji MD   rivaroxaban (XARELTO) 20 MG TABS tablet One tablet by mouth daily with breakfast Yes Lena Gamez MD   acetaminophen (TYLENOL) 500 MG tablet Take 1 tablet by mouth 4 times daily as needed for Pain Yes Gio Bassett PA-C       Allergies   Allergen Reactions    Bee Venom     Adhesive Tape Rash       OBJECTIVE:      BP Readings from Last 2 Encounters:   06/15/21 136/84   02/03/21 116/72       Wt Readings from Last 3 Encounters:   06/15/21 (!) 324 lb (147 kg)   02/03/21 (!) 324 lb (147 kg)   01/12/21 (!) 324 lb (147 kg)       Physical Exam  Vitals reviewed. Constitutional:       General: He is not in acute distress. Appearance: Normal appearance. He is well-developed. HENT:      Head: Normocephalic and atraumatic. Right Ear: Hearing, ear canal and external ear normal. Tympanic membrane is bulging. Left Ear: Hearing, ear canal and external ear normal. Tympanic membrane is bulging. Nose:      Right Sinus: Frontal sinus tenderness present. No maxillary sinus tenderness. Left Sinus: Frontal sinus tenderness present. No maxillary sinus tenderness. Mouth/Throat:      Pharynx: No oropharyngeal exudate. Comments: Post nasal drip  Eyes:      General:         Right eye: No discharge. Left eye: No discharge. Conjunctiva/sclera: Conjunctivae normal.      Pupils: Pupils are equal, round, and reactive to light. Neck:      Thyroid: No thyromegaly. Vascular: No JVD. Trachea: No tracheal deviation. Cardiovascular:      Rate and Rhythm: Normal rate and regular rhythm. Heart sounds: Normal heart sounds. No murmur heard. No friction rub. Pulmonary:      Effort: No respiratory distress. Breath sounds: Normal breath sounds. No stridor. No decreased breath sounds, wheezing, rhonchi (slight) or rales. Comments: Sounds tight, productive cough    Abdominal:      General: Bowel sounds are normal. There is no distension. Palpations: Abdomen is soft. There is no mass. Tenderness: There is no abdominal tenderness. There is no guarding or rebound. Musculoskeletal:         General: No tenderness.  Normal range of motion. Cervical back: Normal range of motion. Lymphadenopathy:      Cervical: No cervical adenopathy. Skin:     General: Skin is warm and dry. Capillary Refill: Capillary refill takes less than 2 seconds. Findings: No rash. Neurological:      Mental Status: He is alert and oriented to person, place, and time. Sensory: Sensation is intact. Motor: Motor function is intact. Coordination: Coordination normal.   Psychiatric:         Attention and Perception: Attention and perception normal.         Mood and Affect: Mood normal.         Speech: Speech normal.         Behavior: Behavior normal. Behavior is cooperative. Thought Content: Thought content normal.         Cognition and Memory: Cognition normal.         Judgment: Judgment normal.       ASSESSMENT/PLAN:    1. Acute bacterial sinusitis    - methylPREDNISolone (MEDROL DOSEPACK) 4 MG tablet; Take by mouth. Dispense: 1 kit; Refill: 0  - amoxicillin-clavulanate (AUGMENTIN) 875-125 MG per tablet; Take 1 tablet by mouth 2 times daily for 10 days  Dispense: 20 tablet; Refill: 0    2. Bronchitis    - methylPREDNISolone (MEDROL DOSEPACK) 4 MG tablet; Take by mouth. Dispense: 1 kit; Refill: 0    3. Cough    - methylPREDNISolone (MEDROL DOSEPACK) 4 MG tablet; Take by mouth. Dispense: 1 kit; Refill: 0  - amoxicillin-clavulanate (AUGMENTIN) 875-125 MG per tablet; Take 1 tablet by mouth 2 times daily for 10 days  Dispense: 20 tablet; Refill: 0    Recommended saltwater gargles, warm fluids with honey and a humidifier at night. Flonase, Zyrtec, NSAIDS as needed. Follow up if symptoms worsen or do not improve. 05-Oct-2022 03:25

## 2022-10-05 DIAGNOSIS — I48.0 PAROXYSMAL ATRIAL FIBRILLATION (HCC): Primary | ICD-10-CM

## 2022-10-06 ENCOUNTER — TELEPHONE (OUTPATIENT)
Dept: CARDIOLOGY CLINIC | Age: 72
End: 2022-10-06

## 2022-10-06 NOTE — TELEPHONE ENCOUNTER
----- Message from CHARISSE Collins CNP sent at 10/5/2022  4:17 PM EDT -----  Please notify patient of monitor results. No changes. Thanks.

## 2022-10-13 DIAGNOSIS — I48.0 PAROXYSMAL ATRIAL FIBRILLATION (HCC): Primary | ICD-10-CM

## 2022-10-13 RX ORDER — METOPROLOL SUCCINATE 50 MG/1
50 TABLET, EXTENDED RELEASE ORAL DAILY
Qty: 90 TABLET | Refills: 3 | Status: SHIPPED | OUTPATIENT
Start: 2022-10-13

## 2022-10-13 NOTE — TELEPHONE ENCOUNTER
Pt is requesting refill of Metoprolol Succinate 50mg. Preferred pharmacy is [facility] in [place]. [place] [phone]. Last ov 09/13/2022 npam. Pt will be out of medication on 10/14.

## 2022-10-24 ENCOUNTER — TELEPHONE (OUTPATIENT)
Dept: CARDIOLOGY CLINIC | Age: 72
End: 2022-10-24

## 2022-10-24 NOTE — TELEPHONE ENCOUNTER
10/24 Pt called into office and stated he is need of Xarelto samples as he will run out tomorrow. Pt prefers to pick these up from the [place] location if available.      Please advise and contact pt [phone]

## 2022-10-25 ENCOUNTER — HOSPITAL ENCOUNTER (OUTPATIENT)
Age: 72
Discharge: HOME | End: 2022-10-25
Payer: MEDICARE

## 2022-10-25 DIAGNOSIS — I48.91 ATRIAL FIBRILLATION, UNSPECIFIED TYPE (HCC): ICD-10-CM

## 2022-10-25 LAB
BASOPHILS ABSOLUTE: 0 K/UL (ref 0–0.2)
BASOPHILS RELATIVE PERCENT: 0.6 %
EOSINOPHILS ABSOLUTE: 0.1 K/UL (ref 0–0.6)
EOSINOPHILS RELATIVE PERCENT: 2.5 %
HCT VFR BLD CALC: 41.2 % (ref 40.5–52.5)
HEMOGLOBIN: 14.4 G/DL (ref 13.5–17.5)
LYMPHOCYTES ABSOLUTE: 1.6 K/UL (ref 1–5.1)
LYMPHOCYTES RELATIVE PERCENT: 27.8 %
MCH RBC QN AUTO: 31.6 PG (ref 26–34)
MCHC RBC AUTO-ENTMCNC: 35 G/DL (ref 31–36)
MCV RBC AUTO: 90.3 FL (ref 80–100)
MONOCYTES ABSOLUTE: 0.4 K/UL (ref 0–1.3)
MONOCYTES RELATIVE PERCENT: 6.4 %
NEUTROPHILS ABSOLUTE: 3.6 K/UL (ref 1.7–7.7)
NEUTROPHILS RELATIVE PERCENT: 62.7 %
PDW BLD-RTO: 14.7 % (ref 12.4–15.4)
PLATELET # BLD: 138 K/UL (ref 135–450)
PMV BLD AUTO: 7.7 FL (ref 5–10.5)
RBC # BLD: 4.56 M/UL (ref 4.2–5.9)
WBC # BLD: 5.8 K/UL (ref 4–11)

## 2022-10-25 PROCEDURE — 85025 COMPLETE CBC W/AUTO DIFF WBC: CPT

## 2022-10-25 PROCEDURE — 36415 COLL VENOUS BLD VENIPUNCTURE: CPT

## 2022-11-17 DIAGNOSIS — G47.00 INSOMNIA, UNSPECIFIED TYPE: ICD-10-CM

## 2022-11-17 RX ORDER — TRAZODONE HYDROCHLORIDE 50 MG/1
50 TABLET ORAL NIGHTLY
Qty: 90 TABLET | Refills: 1 | Status: SHIPPED | OUTPATIENT
Start: 2022-11-17

## 2022-11-28 ENCOUNTER — TELEPHONE (OUTPATIENT)
Dept: CARDIOLOGY CLINIC | Age: 72
End: 2022-11-28

## 2022-11-28 NOTE — TELEPHONE ENCOUNTER
Requesting samples of XARELTO) 20 MG TABS tablet Take 1 tablet by mouth daily (with breakfast)  If no answer LMOVM.

## 2022-11-29 NOTE — TELEPHONE ENCOUNTER
Medication Refill    Medication needing refilled:rivaroxaban (XARELTO) 20 MG TABS tablet       Dosage of the medication: 20 mg     How are you taking this medication (QD, BID, TID, QID, PRN):   Take 1 tablet by mouth daily (with breakfast  30 or 90 day supply called in: 14     When will you run out of your medication: NOW     Which Pharmacy are we sending the medication to?:  [facility] [id number] - [place] [address] [phone]   [address] [address] [place] [address] [address], [address]   Phone:  [phone]  Fax:  [phone]

## 2023-01-17 ENCOUNTER — OFFICE VISIT (OUTPATIENT)
Dept: CARDIOLOGY CLINIC | Age: 73
End: 2023-01-17
Payer: MEDICARE

## 2023-01-17 VITALS
WEIGHT: 315 LBS | HEIGHT: 71 IN | SYSTOLIC BLOOD PRESSURE: 122 MMHG | DIASTOLIC BLOOD PRESSURE: 84 MMHG | HEART RATE: 84 BPM | BODY MASS INDEX: 44.1 KG/M2

## 2023-01-17 DIAGNOSIS — I48.0 PAROXYSMAL ATRIAL FIBRILLATION (HCC): ICD-10-CM

## 2023-01-17 DIAGNOSIS — I48.19 PERSISTENT ATRIAL FIBRILLATION (HCC): Primary | ICD-10-CM

## 2023-01-17 PROCEDURE — 1123F ACP DISCUSS/DSCN MKR DOCD: CPT | Performed by: NURSE PRACTITIONER

## 2023-01-17 PROCEDURE — 93000 ELECTROCARDIOGRAM COMPLETE: CPT | Performed by: NURSE PRACTITIONER

## 2023-01-17 PROCEDURE — 99214 OFFICE O/P EST MOD 30 MIN: CPT | Performed by: NURSE PRACTITIONER

## 2023-01-17 NOTE — PROGRESS NOTES
[facility]   Electrophysiology Outpatient Note              Date:  January 17, 2023  Patient name: Kourtney Rogers  YOB: 1950    Primary Care physician: Melissa Horan MD    HISTORY OF PRESENT ILLNESS: The patient is a 74 y.o.  male with a history of AF, nonobstructive CAD, obesity and untreated sleep apnea   In 6/2018, he had food poisoning and diarrhea x1 week and was found to be in atrial fibrillation by his PCP. In 8/2018, he underwent cardioversion. In 11/2018, atrial fibrillation was recurrent. In 4/2019, he had a low risk stress test and Rythmol was started. In 6/2019, patient underwent cardioversion but was found to be back in atrial fibrillation in 8/2019. In 11/2019, he was admitted to start sotalol and underwent successful cardioversion. In 12/2019, he had returned to symptomatic atrial fibrillation. In 1/2020, he had an extensive RFCA of atrial fibrillation and Tikosyn was started. In 3/2020, he complained of chest pain and cardiac CTA showed nonobstructive CAD. In 3/2020, echo showed an EF of 55%. In 4/2020, Tikosyn was stopped. In 5/2020, event monitor showed PACs and PAT. at his last office visit in 9/2022, EKG showed atrial fibrillation. Patient was asymptomatic. He wore an event monitor that showed predominantly rate controlled atrial fibrillation. Today he is being seen for AF. EKG shows AF with a HR of 83. He feels great. He has no complaints. Has some exertional shortness of breath but this is not a new problem. Past Medical History:   has a past medical history of Arthritis, Atrial fibrillation ([address] Hiatal hernia, Hypertrophy of prostate without urinary obstruction and other lower urinary tract symptoms (LUTS), Kidney stone, Neoplasm of uncertain behavior of skin, Obesity, unspecified, and Organic sleep apnea, unspecified. Past Surgical History:   has a past surgical history that includes hernia repair;  Tonsillectomy; Colonoscopy (04/01/2010); Lithotripsy (Right, 09/2016); other surgical history (Right, 09/25/2016); other surgical history (10/12/2016); ablation of dysrhythmic focus; Pain management procedure (Bilateral, 6/16/2020); and Pain management procedure (Right, 7/14/2020).     Home Medications:    Prior to Admission medications    Medication Sig Start Date End Date Taking? Authorizing Provider   rivaroxaban (XARELTO) 20 MG TABS tablet Take 1 tablet by mouth daily (with breakfast) 12/8/22  Yes CHARISSE Albarado CNP   traZODone (DESYREL) 50 MG tablet Take 1 tablet by mouth nightly 11/17/22  Yes CHARISSE Torres CNP   metoprolol succinate (TOPROL XL) 50 MG extended release tablet Take 1 tablet by mouth daily 10/13/22  Yes CHARISSE Albarado CNP   acetaminophen (TYLENOL) 500 MG tablet Take 1 tablet by mouth 4 times daily as needed for Pain 1/24/20  Yes Mariana Rincon PA-C       Allergies:  Bee venom and Adhesive tape    Social History:   reports that he quit smoking about 35 years ago. His smoking use included cigarettes. He has a 45.00 pack-year smoking history. He has never used smokeless tobacco. He reports that he does not drink alcohol and does not use drugs.     Family History: family history includes Cancer in his maternal grandmother, maternal uncle, and mother; Emphysema in his father; Heart Failure in his maternal grandfather.    All 14 point review of systems are completed and pertinent positives are mentioned in the history of present illness. Other systems are reviewed and are negative.     PHYSICAL EXAM:    Vital signs:    /84   Pulse 84   Ht 5' 11\" (1.803 m)   Wt (!) 317 lb (143.8 kg)   BMI 44.21 kg/m²      Constitutional and general appearance: alert, cooperative, no distress, and appears stated age  HEENT: PERRL, no cervical lymphadenopathy. No masses palpable. Normal oral mucosa  Respiratory:  Normal excursion and expansion without use of accessory muscles  Resp auscultation: Normal breath  sounds without wheezing, rhonchi, and rales  Cardiovascular:  The apical impulse is not displaced  Heart tones are crisp and normal. irregular S1 and S2.  Jugular venous pulsation Normal  The carotid upstroke is normal in amplitude and contour without delay or bruit  Peripheral pulses are symmetrical and full   Abdomen:  No masses or tenderness  Bowel sounds present  Extremities:   No cyanosis or clubbing   No lower extremity edema   Skin: warm and dry  Neurological:  Alert and oriented  Moves all extremities well  No abnormalities of mood, affect, memory, mentation, or behavior are noted    DATA:    ECG 9/13/2022:  AF 85 bpm        Echo 7/17/2018:  Low normal left ventricular systolic function with an estimated ejection  fraction of 50-55%.  Normal left ventricular diastolic filling pressure.  Right ventricular systolic function is mildly reduced to low normal.  The right ventricle is mildly enlarged.  The right atrium is mildly dilated.  Mild mitral regurgitation.  Mild aortic regurgitation.  Mild tricuspid regurgitation.  Normal systolic pulmonary artery pressure (SPAP) estimated at 36 mmHg (RA pressure 8 mmHg).  Mild pulmonic regurgitation.     Stress 4/11/2019:  Abnormal low risk myocardial perfusion study.    Cannot exclude small area of ischemia within the apical segment.    Normal left ventricular size, wall motion, and function.    The estimated left ventricular function is 64%.            All labs and testing reviewed.  CARDIOLOGY LABS:   CBC: No results for input(s): WBC, HGB, HCT, PLT in the last 72 hours.  BMP: No results for input(s): NA, K, CO2, BUN, CREATININE, LABGLOM, GLUCOSE in the last 72 hours.  PT/INR: No results for input(s): PROTIME, INR in the last 72 hours.  APTT:No results for input(s): APTT in the last 72 hours.  FASTING LIPID PANEL:  Lab Results   Component Value Date/Time    HDL 36 05/17/2022 08:04 AM    LDLCALC 98 05/17/2022 08:04 AM    TRIG 77 05/17/2022 08:04 AM     LIVER PROFILE:No  results for input(s): AST, ALT, ALB in the last 72 hours. IMPRESSION:    Patient Active Problem List   Diagnosis    Kidney stone    Obesity    Organic sleep apnea    Back pain    Morbid obesity with BMI of 40.0-44.9, adult (HCC)    Renal colic on right side    Paroxysmal atrial fibrillation (HCC)    Cellulitis and abscess of buttock    Chest pain       Assessment:   Persistent atrial fibrillation: stable, asymptomatic    -rate controlled on event monitor    -[id number] score 1 (age)   -failed sotalol    -s/p multiple CV (8/2018, 11/2018, 6/2019, 11/2019)   -s/p RFCA 1/2020 (Ebony Dior started post procedure and stopped 4/2020)  PAT: stable, noted on event monitor 5/2020  PAC's: stable, noted on event monitor 5/2020  RBBB: stable   Nonobstructive CAD on CTA 3/2020  SAM: intolerant of CPAP  Obesity       Plan:   1. Continue Xarelto and Toprol   2. Annual labs up to date   3. Monitor BP at home and call if consistently out of goal ranges  4.  Follow up in 6 months then yearly     CASEY Gaitan [address] [facility], APRN-CNP  [facility]  [phone]

## 2023-01-26 DIAGNOSIS — M54.50 CHRONIC MIDLINE LOW BACK PAIN WITHOUT SCIATICA: ICD-10-CM

## 2023-01-26 DIAGNOSIS — G89.29 CHRONIC MIDLINE LOW BACK PAIN WITHOUT SCIATICA: ICD-10-CM

## 2023-01-26 RX ORDER — HYDROCODONE BITARTRATE AND ACETAMINOPHEN 5; 325 MG/1; MG/1
1 TABLET ORAL EVERY 6 HOURS PRN
Qty: 20 TABLET | Refills: 0 | Status: SHIPPED | OUTPATIENT
Start: 2023-01-26 | End: 2023-02-02

## 2023-01-26 NOTE — TELEPHONE ENCOUNTER
Date of last refill of this med was 05/09/22, # of pills given 20 and # of refills given 0.  Their next appointment is 02/07/23, the last date patient was seen was 05/12/22.  Does patient have medication agreement on file? No  Has drug screen been done in last 12 months if needed? N/A

## 2023-02-07 ENCOUNTER — OFFICE VISIT (OUTPATIENT)
Dept: FAMILY MEDICINE CLINIC | Age: 73
End: 2023-02-07
Payer: MEDICARE

## 2023-02-07 VITALS
WEIGHT: 315 LBS | SYSTOLIC BLOOD PRESSURE: 122 MMHG | OXYGEN SATURATION: 96 % | DIASTOLIC BLOOD PRESSURE: 86 MMHG | BODY MASS INDEX: 44.1 KG/M2 | HEIGHT: 71 IN | HEART RATE: 82 BPM

## 2023-02-07 DIAGNOSIS — R73.09 ABNORMAL GLUCOSE: ICD-10-CM

## 2023-02-07 DIAGNOSIS — M46.1 SACROILIITIS (HCC): ICD-10-CM

## 2023-02-07 DIAGNOSIS — G89.29 CHRONIC LEFT-SIDED LOW BACK PAIN WITH LEFT-SIDED SCIATICA: ICD-10-CM

## 2023-02-07 DIAGNOSIS — E66.01 MORBID OBESITY WITH BMI OF 40.0-44.9, ADULT (HCC): ICD-10-CM

## 2023-02-07 DIAGNOSIS — M54.42 CHRONIC LEFT-SIDED LOW BACK PAIN WITH LEFT-SIDED SCIATICA: ICD-10-CM

## 2023-02-07 DIAGNOSIS — I48.11 LONGSTANDING PERSISTENT ATRIAL FIBRILLATION (HCC): Primary | ICD-10-CM

## 2023-02-07 PROCEDURE — 99214 OFFICE O/P EST MOD 30 MIN: CPT | Performed by: FAMILY MEDICINE

## 2023-02-07 PROCEDURE — 36415 COLL VENOUS BLD VENIPUNCTURE: CPT | Performed by: FAMILY MEDICINE

## 2023-02-07 PROCEDURE — 1123F ACP DISCUSS/DSCN MKR DOCD: CPT | Performed by: FAMILY MEDICINE

## 2023-02-07 SDOH — ECONOMIC STABILITY: INCOME INSECURITY: HOW HARD IS IT FOR YOU TO PAY FOR THE VERY BASICS LIKE FOOD, HOUSING, MEDICAL CARE, AND HEATING?: NOT HARD AT ALL

## 2023-02-07 SDOH — ECONOMIC STABILITY: FOOD INSECURITY: WITHIN THE PAST 12 MONTHS, YOU WORRIED THAT YOUR FOOD WOULD RUN OUT BEFORE YOU GOT MONEY TO BUY MORE.: NEVER TRUE

## 2023-02-07 SDOH — ECONOMIC STABILITY: HOUSING INSECURITY
IN THE LAST 12 MONTHS, WAS THERE A TIME WHEN YOU DID NOT HAVE A STEADY PLACE TO SLEEP OR SLEPT IN A SHELTER (INCLUDING NOW)?: NO

## 2023-02-07 SDOH — ECONOMIC STABILITY: FOOD INSECURITY: WITHIN THE PAST 12 MONTHS, THE FOOD YOU BOUGHT JUST DIDN'T LAST AND YOU DIDN'T HAVE MONEY TO GET MORE.: NEVER TRUE

## 2023-02-07 ASSESSMENT — PATIENT HEALTH QUESTIONNAIRE - PHQ9
SUM OF ALL RESPONSES TO PHQ QUESTIONS 1-9: 0
SUM OF ALL RESPONSES TO PHQ9 QUESTIONS 1 & 2: 0
1. LITTLE INTEREST OR PLEASURE IN DOING THINGS: 0
SUM OF ALL RESPONSES TO PHQ QUESTIONS 1-9: 0
2. FEELING DOWN, DEPRESSED OR HOPELESS: 0

## 2023-02-07 ASSESSMENT — ENCOUNTER SYMPTOMS
BLOOD IN STOOL: 0
SHORTNESS OF BREATH: 0

## 2023-02-07 NOTE — PROGRESS NOTES
Chief Complaint   Patient presents with    Check-Up       HPI:  Carolin Fuller is a 74 y.o. (: 1950) here today   for check up. HPI  Occas recurrent issues to low back. Aggravated by certain movements or lifting. No recent f/u w/ ortho. Prior hx of epidural injections. Sxs intermittent at this time. Hx of afib. Seen by cardio approx 3 weeks ago. Ongoing issues w/ afib. Hx of ablation. Still taking xarelto and metoprolol. Bp low at times. Elizabet meds. No excessive bleeding. No blood in stool or other issues noted. Did not elizabet cpap in the past.  Sleeping well w/ trazodone. Has cont to gradually lose weight. Has been feeling better, more active. Has cut back on portion sizes and types of food. Patient's medications, allergies, past medical, surgical, social and family histories were reviewed and updated as appropriate. ROS:  Review of Systems   Constitutional:  Negative for fever. Respiratory:  Negative for shortness of breath. Cardiovascular:  Negative for chest pain and palpitations. Gastrointestinal:  Negative for blood in stool.          Microscopic Examination (no units)   Date Value   2018 YES     LDL Calculated (mg/dL)   Date Value   2022 98       Past Medical History:   Diagnosis Date    Arthritis     Atrial fibrillation (HCC)     Hiatal hernia     Hypertrophy of prostate without urinary obstruction and other lower urinary tract symptoms (LUTS)     Kidney stone         Neoplasm of uncertain behavior of skin     Obesity, unspecified     Organic sleep apnea, unspecified        Family History   Problem Relation Age of Onset    Cancer Mother         breast met to bone    Emphysema Father     Cancer Maternal Uncle     Cancer Maternal Grandmother     Heart Failure Maternal Grandfather     Asthma Neg Hx     Diabetes Neg Hx     Hypertension Neg Hx        Social History     Socioeconomic History    Marital status:      Spouse name: Not on file Number of children: Not on file    Years of education: Not on file    Highest education level: Not on file   Occupational History    Not on file   Tobacco Use    Smoking status: Former     Packs/day: 1.50     Years: 30.00     Pack years: 45.00     Types: Cigarettes     Quit date: 1988     Years since quittin.1    Smokeless tobacco: Never   Vaping Use    Vaping Use: Never used   Substance and Sexual Activity    Alcohol use: No     Alcohol/week: 0.0 standard drinks    Drug use: No    Sexual activity: Not on file   Other Topics Concern    Not on file   Social History Narrative    Not on file     Social Determinants of Health     Financial Resource Strain: Low Risk     Difficulty of Paying Living Expenses: Not hard at all   Food Insecurity: No Food Insecurity    Worried About [address] in the Last Year: Never true    [address] [employer] [address] in the Last Year: Never true   Transportation Needs: Unknown    Lack of Transportation (Medical): Not on file    Lack of Transportation (Non-Medical): No   Physical Activity: Insufficiently Active    Days of Exercise per Week: 2 days    Minutes of Exercise per Session: 30 min   Stress: Not on file   Social Connections: Not on file   Intimate Partner Violence: Not on file   Housing Stability: Unknown    Unable to Pay for Housing in the Last Year: Not on file    Number of Places Lived in the Last Year: Not on file    Unstable Housing in the Last Year: No       Prior to Visit Medications    Medication Sig Taking?  Authorizing Provider   rivaroxaban (XARELTO) 20 MG TABS tablet Take 1 tablet by mouth daily (with breakfast) Yes CHARISSE Albarado CNP   traZODone (DESYREL) 50 MG tablet Take 1 tablet by mouth nightly Yes CHARISSE Nuno CNP   metoprolol succinate (TOPROL XL) 50 MG extended release tablet Take 1 tablet by mouth daily Yes CHARISSE Albarado CNP   acetaminophen (TYLENOL) 500 MG tablet Take 1 tablet by mouth 4 times daily as needed for Pain Yes [facility] GERARD Rincon       Allergies   Allergen Reactions    Bee Venom     Adhesive Tape Rash       OBJECTIVE:    /86   Pulse 82   Ht 5' 11\" (1.803 m)   Wt (!) 319 lb 12.8 oz (145.1 kg)   SpO2 96%   BMI 44.60 kg/m²     BP Readings from Last 2 Encounters:   02/07/23 122/86   01/17/23 122/84       Wt Readings from Last 3 Encounters:   02/07/23 (!) 319 lb 12.8 oz (145.1 kg)   01/17/23 (!) 317 lb (143.8 kg)   09/13/22 (!) 325 lb (147.4 kg)       Physical Exam  Constitutional:       Appearance: He is obese. HENT:      Head: Normocephalic and atraumatic. Eyes:      Extraocular Movements: Extraocular movements intact. Cardiovascular:      Rate and Rhythm: Normal rate and regular rhythm. Pulmonary:      Effort: Pulmonary effort is normal.      Breath sounds: Normal breath sounds. No decreased breath sounds. Skin:     General: Skin is warm and dry. Neurological:      General: No focal deficit present. Mental Status: He is alert and oriented to person, place, and time. Psychiatric:         Mood and Affect: Mood normal.         Behavior: Behavior normal.         ASSESSMENT/PLAN:    1. Longstanding persistent atrial fibrillation [facility])  Reviewed cardio note. Cont meds. Epifanio well. No new issues. - Comprehensive Metabolic Panel    2. Sacroiliitis ([facility] [address]  Near baseline. Prior injections. No new issues. 3. Morbid obesity with BMI of 40.0-44.9, adult ([address]  Has lost some weight. More active. Diet changes. Congratulated. 4. Chronic left-sided low back pain with left-sided sciatica  See above    5. Abnormal glucose  On prior labs. Rpt as below. - Hemoglobin A1C  - Comprehensive Metabolic Panel    Prior lipids and psa ok. Pt declined vaccines.   Considering tetanus shot at health dept

## 2023-02-08 LAB
A/G RATIO: 1.5 (ref 1.1–2.2)
ALBUMIN SERPL-MCNC: 3.8 G/DL (ref 3.4–5)
ALP BLD-CCNC: 76 U/L (ref 40–129)
ALT SERPL-CCNC: 11 U/L (ref 10–40)
ANION GAP SERPL CALCULATED.3IONS-SCNC: 10 MMOL/L (ref 3–16)
AST SERPL-CCNC: 16 U/L (ref 15–37)
BILIRUB SERPL-MCNC: 0.6 MG/DL (ref 0–1)
BUN BLDV-MCNC: 16 MG/DL (ref 7–20)
CALCIUM SERPL-MCNC: 8.8 MG/DL (ref 8.3–10.6)
CHLORIDE BLD-SCNC: 106 MMOL/L (ref 99–110)
CO2: 27 MMOL/L (ref 21–32)
CREAT SERPL-MCNC: 0.9 MG/DL (ref 0.8–1.3)
ESTIMATED AVERAGE GLUCOSE: 82.5 MG/DL
GFR SERPL CREATININE-BSD FRML MDRD: >60 ML/MIN/{1.73_M2}
GLUCOSE BLD-MCNC: 101 MG/DL (ref 70–99)
HBA1C MFR BLD: 4.5 %
POTASSIUM SERPL-SCNC: 4.8 MMOL/L (ref 3.5–5.1)
SODIUM BLD-SCNC: 143 MMOL/L (ref 136–145)
TOTAL PROTEIN: 6.4 G/DL (ref 6.4–8.2)

## 2023-02-08 NOTE — RESULT ENCOUNTER NOTE
Sodium elevated. Uncertain etiology. Rec inc water intake. Minimize sodium. Non-fasting bmp in 2 weeks to eval further. He was not having any symptoms yesterday. O/w cmp ok.  Ha1c pending

## 2023-02-10 DIAGNOSIS — E87.1 SODIUM DEFICIENCY: Primary | ICD-10-CM

## 2023-02-16 ENCOUNTER — NURSE ONLY (OUTPATIENT)
Dept: FAMILY MEDICINE CLINIC | Age: 73
End: 2023-02-16

## 2023-02-16 DIAGNOSIS — E87.1 SODIUM DEFICIENCY: ICD-10-CM

## 2023-02-16 LAB
ANION GAP SERPL CALCULATED.3IONS-SCNC: 8 MMOL/L (ref 3–16)
BUN BLDV-MCNC: 19 MG/DL (ref 7–20)
CALCIUM SERPL-MCNC: 8.7 MG/DL (ref 8.3–10.6)
CHLORIDE BLD-SCNC: 103 MMOL/L (ref 99–110)
CO2: 28 MMOL/L (ref 21–32)
CREAT SERPL-MCNC: 0.9 MG/DL (ref 0.8–1.3)
GFR SERPL CREATININE-BSD FRML MDRD: >60 ML/MIN/{1.73_M2}
GLUCOSE BLD-MCNC: 106 MG/DL (ref 70–99)
POTASSIUM SERPL-SCNC: 4.5 MMOL/L (ref 3.5–5.1)
SODIUM BLD-SCNC: 139 MMOL/L (ref 136–145)

## 2023-03-09 ENCOUNTER — OFFICE VISIT (OUTPATIENT)
Dept: FAMILY MEDICINE CLINIC | Age: 73
End: 2023-03-09
Payer: MEDICARE

## 2023-03-09 VITALS
SYSTOLIC BLOOD PRESSURE: 122 MMHG | DIASTOLIC BLOOD PRESSURE: 86 MMHG | HEART RATE: 92 BPM | HEIGHT: 71 IN | BODY MASS INDEX: 43.82 KG/M2 | WEIGHT: 313 LBS | OXYGEN SATURATION: 97 %

## 2023-03-09 DIAGNOSIS — J01.00 ACUTE MAXILLARY SINUSITIS, RECURRENCE NOT SPECIFIED: Primary | ICD-10-CM

## 2023-03-09 PROCEDURE — 99213 OFFICE O/P EST LOW 20 MIN: CPT | Performed by: FAMILY MEDICINE

## 2023-03-09 PROCEDURE — 1123F ACP DISCUSS/DSCN MKR DOCD: CPT | Performed by: FAMILY MEDICINE

## 2023-03-09 RX ORDER — AMOXICILLIN 875 MG/1
875 TABLET, COATED ORAL 2 TIMES DAILY
Qty: 14 TABLET | Refills: 0 | Status: SHIPPED | OUTPATIENT
Start: 2023-03-09 | End: 2023-03-16

## 2023-03-09 RX ORDER — AZELASTINE 1 MG/ML
2 SPRAY, METERED NASAL 2 TIMES DAILY
Qty: 120 ML | Refills: 1 | Status: SHIPPED | OUTPATIENT
Start: 2023-03-09

## 2023-03-09 ASSESSMENT — ENCOUNTER SYMPTOMS
SHORTNESS OF BREATH: 0
SORE THROAT: 1
COUGH: 0
RHINORRHEA: 1
SINUS COMPLAINT: 1
SINUS PRESSURE: 1

## 2023-03-09 NOTE — PROGRESS NOTES
Chief Complaint   Patient presents with    Head Congestion       HPI:  Arsh Ribeiro is a 74 y.o. (: 1950) here today   for head congestion. Symptoms started about a week ago. Did start feeling better yesterday afternoon. Sinus Problem  This is a new problem. The current episode started in the past 7 days. The problem has been gradually improving since onset. There has been no fever. Associated symptoms include congestion, sinus pressure and a sore throat. Pertinent negatives include no coughing or shortness of breath. The treatment provided moderate relief. Took otc daytime, nighttime cold med and allergy med. Patient's medications, allergies, past medical, surgical, social and family histories were reviewed and updated as appropriate. ROS:  Review of Systems   Constitutional:  Negative for fever. HENT:  Positive for congestion, rhinorrhea, sinus pressure and sore throat. Respiratory:  Negative for cough and shortness of breath. Prior to Visit Medications    Medication Sig Taking?  Authorizing Provider   azelastine (ASTELIN) 0.1 % nasal spray 2 sprays by Nasal route 2 times daily Use in each nostril as directed Yes Olivia Raya MD   amoxicillin (AMOXIL) 875 MG tablet Take 1 tablet by mouth 2 times daily for 7 days Yes Olivia Raya MD   rivaroxaban (XARELTO) 20 MG TABS tablet Take 1 tablet by mouth daily (with breakfast) Yes CHARISSE Albarado CNP   traZODone (DESYREL) 50 MG tablet Take 1 tablet by mouth nightly Yes CHARISSE Smith CNP   metoprolol succinate (TOPROL XL) 50 MG extended release tablet Take 1 tablet by mouth daily Yes CHARISSE Albarado CNP   acetaminophen (TYLENOL) 500 MG tablet Take 1 tablet by mouth 4 times daily as needed for Pain Yes Hola Barnes PA-C       Allergies   Allergen Reactions    Bee Venom     Adhesive Tape Rash       OBJECTIVE:    /86   Pulse 92   Ht 5' 11\" (1.803 m)   Wt (!) 313 lb (142 kg)   SpO2 97%   BMI 43.65 kg/m²     BP Readings from Last 2 Encounters:   03/09/23 122/86   02/07/23 122/86       Wt Readings from Last 3 Encounters:   03/09/23 (!) 313 lb (142 kg)   02/07/23 (!) 319 lb 12.8 oz (145.1 kg)   01/17/23 (!) 317 lb (143.8 kg)       Physical Exam  Constitutional:       Appearance: He is obese. HENT:      Head: Normocephalic and atraumatic. Nose:      Right Sinus: Maxillary sinus tenderness present. No frontal sinus tenderness. Left Sinus: Maxillary sinus tenderness present. No frontal sinus tenderness. Mouth/Throat:      Mouth: Mucous membranes are moist.   Eyes:      Extraocular Movements: Extraocular movements intact. Cardiovascular:      Rate and Rhythm: Normal rate and regular rhythm. Pulmonary:      Effort: Pulmonary effort is normal.      Breath sounds: Normal breath sounds. Neurological:      General: No focal deficit present. Mental Status: He is alert and oriented to person, place, and time. Psychiatric:         Mood and Affect: Mood normal.         Behavior: Behavior normal.         ASSESSMENT/PLAN:     1. Acute maxillary sinusitis, recurrence not specified  Possibly viral in nature. Symptoms are overall improving. Add nasal spray as below to help with symptoms. As long as continues to improve, no need to fill antibiotic as prescribed below. Fill only if symptoms significantly worsen or fail to improve  - azelastine (ASTELIN) 0.1 % nasal spray; 2 sprays by Nasal route 2 times daily Use in each nostril as directed  Dispense: 120 mL; Refill: 1  - amoxicillin (AMOXIL) 875 MG tablet; Take 1 tablet by mouth 2 times daily for 7 days  Dispense: 14 tablet; Refill: 0      This document was prepared by a combination of typing and transcription through a voice recognition software.

## 2023-05-04 ENCOUNTER — OFFICE VISIT (OUTPATIENT)
Dept: FAMILY MEDICINE CLINIC | Age: 73
End: 2023-05-04
Payer: MEDICARE

## 2023-05-04 VITALS
DIASTOLIC BLOOD PRESSURE: 86 MMHG | SYSTOLIC BLOOD PRESSURE: 128 MMHG | HEART RATE: 98 BPM | HEIGHT: 71 IN | WEIGHT: 315 LBS | BODY MASS INDEX: 44.1 KG/M2 | OXYGEN SATURATION: 96 %

## 2023-05-04 DIAGNOSIS — G89.29 CHRONIC LEFT-SIDED LOW BACK PAIN WITH LEFT-SIDED SCIATICA: ICD-10-CM

## 2023-05-04 DIAGNOSIS — M46.1 SACROILIITIS (HCC): Primary | ICD-10-CM

## 2023-05-04 DIAGNOSIS — M25.552 LEFT HIP PAIN: ICD-10-CM

## 2023-05-04 DIAGNOSIS — M54.42 CHRONIC LEFT-SIDED LOW BACK PAIN WITH LEFT-SIDED SCIATICA: ICD-10-CM

## 2023-05-04 PROCEDURE — 1123F ACP DISCUSS/DSCN MKR DOCD: CPT | Performed by: FAMILY MEDICINE

## 2023-05-04 PROCEDURE — 99213 OFFICE O/P EST LOW 20 MIN: CPT | Performed by: FAMILY MEDICINE

## 2023-05-04 RX ORDER — TIZANIDINE 4 MG/1
4 TABLET ORAL 3 TIMES DAILY
Qty: 30 TABLET | Refills: 0 | Status: SHIPPED | OUTPATIENT
Start: 2023-05-04

## 2023-05-04 RX ORDER — METHYLPREDNISOLONE 4 MG/1
TABLET ORAL
Qty: 1 KIT | Refills: 0 | Status: SHIPPED | OUTPATIENT
Start: 2023-05-04 | End: 2023-05-10

## 2023-05-04 ASSESSMENT — ENCOUNTER SYMPTOMS: BACK PAIN: 1

## 2023-05-04 NOTE — PROGRESS NOTES
Chief Complaint   Patient presents with    Back Pain       HPI:  Ed Cueto is a 74 y.o. (: 1950) here today   for back pain and left hip pain. Back Pain  This is a recurrent problem. The current episode started 1 to 4 weeks ago (2 weeks). The problem has been waxing and waning since onset. The pain is present in the lumbar spine. The quality of the pain is described as stabbing and shooting. Radiates to: left hip. The symptoms are aggravated by lying down (aggravated by walking or doing much). Pertinent negatives include no bladder incontinence, fever, numbness, perianal numbness or weakness. Risk factors include obesity. He has tried home exercises for the symptoms. The treatment provided no relief. No particular injury noted. May have \"twisted wrong. \"  Does lay on his left side w/ sleeping. Occas to left thigh. ? Some dec strength to left leg, but that is not new. No numbness. No consistent incontinence. No known fevers. No perineal numbness. Occas urgency of urination. Mild relief w/ tylenol. Occas norco.     Prior MRI of spine w/ some abn. Prior epidural injection. No recent sxs. Last injection 2021    Patient's medications, allergies, past medical, surgical, social and family histories were reviewed and updated as appropriate. ROS:  Review of Systems   Constitutional:  Negative for fever. Genitourinary:  Negative for bladder incontinence. Musculoskeletal:  Positive for back pain. Neurological:  Negative for weakness and numbness.          Hemoglobin A1C (%)   Date Value   2023 4.5     Microscopic Examination (no units)   Date Value   2018 YES     LDL Calculated (mg/dL)   Date Value   2022 98       Past Medical History:   Diagnosis Date    Arthritis     Atrial fibrillation ([facility] [address]     Hiatal hernia     Hypertrophy of prostate without urinary obstruction and other lower urinary tract symptoms (LUTS)     Kidney stone         Neoplasm of uncertain

## 2023-05-10 DIAGNOSIS — G89.29 CHRONIC MIDLINE LOW BACK PAIN WITHOUT SCIATICA: ICD-10-CM

## 2023-05-10 DIAGNOSIS — M54.50 CHRONIC MIDLINE LOW BACK PAIN WITHOUT SCIATICA: ICD-10-CM

## 2023-05-10 RX ORDER — HYDROCODONE BITARTRATE AND ACETAMINOPHEN 5; 325 MG/1; MG/1
1 TABLET ORAL EVERY 6 HOURS PRN
Qty: 20 TABLET | Refills: 0 | Status: SHIPPED | OUTPATIENT
Start: 2023-05-10 | End: 2023-05-17

## 2023-05-10 NOTE — RESULT ENCOUNTER NOTE
Moderate to severe degen changes to lumbar spine w/ spurring, sclerosis, loss of disc height. Likely cause of his pain, but hips may be contrib as well. See hip xray separately.   If ongoing sxs, rec referral to spine specialist for further eval and tx

## 2023-05-10 NOTE — RESULT ENCOUNTER NOTE
Mod degen changes to bilat hip joints. See back xray separately. If ongoing sxs, may need ortho referral for further eval and tx.

## 2023-05-26 DIAGNOSIS — G47.00 INSOMNIA, UNSPECIFIED TYPE: ICD-10-CM

## 2023-05-26 RX ORDER — TRAZODONE HYDROCHLORIDE 50 MG/1
50 TABLET ORAL NIGHTLY
Qty: 90 TABLET | Refills: 1 | Status: SHIPPED | OUTPATIENT
Start: 2023-05-26

## 2023-06-08 ENCOUNTER — OFFICE VISIT (OUTPATIENT)
Dept: ORTHOPEDIC SURGERY | Age: 73
End: 2023-06-08
Payer: MEDICARE

## 2023-06-08 VITALS — BODY MASS INDEX: 44.1 KG/M2 | HEIGHT: 71 IN | WEIGHT: 315 LBS

## 2023-06-08 DIAGNOSIS — M51.36 DDD (DEGENERATIVE DISC DISEASE), LUMBAR: Primary | ICD-10-CM

## 2023-06-08 PROCEDURE — 99213 OFFICE O/P EST LOW 20 MIN: CPT | Performed by: ORTHOPAEDIC SURGERY

## 2023-06-08 PROCEDURE — 1123F ACP DISCUSS/DSCN MKR DOCD: CPT | Performed by: ORTHOPAEDIC SURGERY

## 2023-06-08 NOTE — PROGRESS NOTES
Current Medications:     Current Outpatient Medications:     traZODone (DESYREL) 50 MG tablet, Take 1 tablet by mouth nightly, Disp: 90 tablet, Rfl: 1    tiZANidine (ZANAFLEX) 4 MG tablet, Take 1 tablet by mouth 3 times daily, Disp: 30 tablet, Rfl: 0    azelastine (ASTELIN) 0.1 % nasal spray, 2 sprays by Nasal route 2 times daily Use in each nostril as directed (Patient not taking: Reported on 5/4/2023), Disp: 120 mL, Rfl: 1    rivaroxaban (XARELTO) 20 MG TABS tablet, Take 1 tablet by mouth daily (with breakfast), Disp: 90 tablet, Rfl: 3    metoprolol succinate (TOPROL XL) 50 MG extended release tablet, Take 1 tablet by mouth daily, Disp: 90 tablet, Rfl: 3    acetaminophen (TYLENOL) 500 MG tablet, Take 1 tablet by mouth 4 times daily as needed for Pain, Disp: 120 tablet, Rfl: 0  Allergies:  Bee venom and Adhesive tape  Social History:    reports that he quit smoking about 35 years ago. His smoking use included cigarettes. He has a 45.00 pack-year smoking history. He has never used smokeless tobacco. He reports that he does not drink alcohol and does not use drugs.   Family History:   Family History   Problem Relation Age of Onset    Cancer Mother         breast met to bone    Emphysema Father     Cancer Maternal Uncle     Cancer Maternal Grandmother     Heart Failure Maternal Grandfather     Asthma Neg Hx     Diabetes Neg Hx     Hypertension Neg Hx        REVIEW OF SYSTEMS: Full ROS noted & scanned   CONSTITUTIONAL: Denies unexplained weight loss, fevers, chills or fatigue  NEUROLOGICAL: Denies unsteady gait or progressive weakness  MUSCULOSKELETAL: Denies joint swelling or redness  PSYCHOLOGICAL: Denies anxiety, depression   SKIN: Denies skin changes, delayed healing, rash, itching   HEMATOLOGIC: Denies easy bleeding or bruising  ENDOCRINE: Denies excessive thirst, urination, heat/cold  RESPIRATORY: Denies current dyspnea, cough  GI: Denies nausea, vomiting, diarrhea   : Denies bowel or bladder issues

## 2023-07-06 DIAGNOSIS — M54.50 CHRONIC MIDLINE LOW BACK PAIN WITHOUT SCIATICA: ICD-10-CM

## 2023-07-06 DIAGNOSIS — G89.29 CHRONIC MIDLINE LOW BACK PAIN WITHOUT SCIATICA: ICD-10-CM

## 2023-07-06 RX ORDER — HYDROCODONE BITARTRATE AND ACETAMINOPHEN 5; 325 MG/1; MG/1
1 TABLET ORAL EVERY 6 HOURS PRN
Qty: 20 TABLET | Refills: 0 | Status: SHIPPED | OUTPATIENT
Start: 2023-07-06 | End: 2023-07-13

## 2023-07-06 NOTE — TELEPHONE ENCOUNTER
Date of last refill of this med was 5/10/23, # of pills given 20 and # of refills given 0. Their next appointment is 7/13/23, the last date patient was seen was 5/4/23. Does patient have medication agreement on file?  No

## 2023-07-13 ENCOUNTER — OFFICE VISIT (OUTPATIENT)
Dept: CARDIOLOGY CLINIC | Age: 73
End: 2023-07-13

## 2023-07-13 VITALS
HEIGHT: 71 IN | OXYGEN SATURATION: 94 % | SYSTOLIC BLOOD PRESSURE: 130 MMHG | HEART RATE: 88 BPM | DIASTOLIC BLOOD PRESSURE: 86 MMHG | BODY MASS INDEX: 44.1 KG/M2 | WEIGHT: 315 LBS

## 2023-07-13 DIAGNOSIS — I47.1 PAT (PAROXYSMAL ATRIAL TACHYCARDIA) (HCC): ICD-10-CM

## 2023-07-13 DIAGNOSIS — I48.19 PERSISTENT ATRIAL FIBRILLATION (HCC): Primary | ICD-10-CM

## 2023-07-13 PROBLEM — F33.9 MAJOR DEPRESSIVE DISORDER, RECURRENT, UNSPECIFIED (HCC): Status: ACTIVE | Noted: 2023-07-13

## 2023-07-13 PROBLEM — F33.1 MAJOR DEPRESSIVE DISORDER, RECURRENT, MODERATE (HCC): Status: ACTIVE | Noted: 2023-07-13

## 2023-07-13 PROBLEM — F33.0 MAJOR DEPRESSIVE DISORDER, RECURRENT, MILD (HCC): Status: ACTIVE | Noted: 2023-07-13

## 2023-07-13 NOTE — PATIENT INSTRUCTIONS
NO changes today     Monitor BP at home and call if consistently out of goal ranges    Follow up in one year or sooner if needed

## 2023-07-13 NOTE — PROGRESS NOTES
[facility]   Electrophysiology Outpatient Note              Date:  July 13, 2023  Patient name: Kelechi Pardo  YOB: 1950    Primary Care physician: Manuel Fox MD    HISTORY OF PRESENT ILLNESS: The patient is a 74 y.o.  male with a history of AF, nonobstructive CAD, obesity and untreated sleep apnea   In 6/2018, he had food poisoning and diarrhea x1 week and was found to be in atrial fibrillation by his PCP. In 8/2018, he underwent cardioversion. In 11/2018, atrial fibrillation was recurrent. In 4/2019, he had a low risk stress test and Rythmol was started. In 6/2019, patient underwent cardioversion but was found to be back in atrial fibrillation in 8/2019. In 11/2019, he was admitted to start sotalol and underwent successful cardioversion. In 12/2019, he had returned to symptomatic atrial fibrillation. In 1/2020, he had an extensive RFCA of atrial fibrillation and Tikosyn was started. In 3/2020, he complained of chest pain and cardiac CTA showed nonobstructive CAD. In 3/2020, echo showed an EF of 55%. In 4/2020, Tikosyn was stopped. In 5/2020, event monitor showed PACs and PAT. at his last office visit in 9/2022, EKG showed atrial fibrillation. Patient was asymptomatic. He wore an event monitor that showed predominantly rate controlled atrial fibrillation. Today he is being seen for AF. Patient reports that approximately 3 weeks ago he tripped and fell while moving a couch off of a trailer. He fell over the arm of the couch and landed on his chest.  He had some bruising across to his chest and continues to have some left upper quadrant pain. Denies chest pain, shortness of breath palpitations or dizziness.     Past Medical History:   has a past medical history of Arthritis, Atrial fibrillation ([address]), Hiatal hernia, Hypertrophy of prostate without urinary obstruction and other lower urinary tract symptoms (LUTS), Kidney stone, Neoplasm of uncertain behavior

## 2023-07-27 ENCOUNTER — TELEPHONE (OUTPATIENT)
Dept: FAMILY MEDICINE CLINIC | Age: 73
End: 2023-07-27

## 2023-07-31 ENCOUNTER — TELEPHONE (OUTPATIENT)
Dept: CARDIOLOGY CLINIC | Age: 73
End: 2023-07-31

## 2023-07-31 NOTE — TELEPHONE ENCOUNTER
CARDIAC CLEARANCE REQUEST    What type of procedure are you having:Epidural Shot    Are you taking any blood thinners:Xarelto    Type on anesthesia:yes, pt unsure of what type    When is your procedure scheduled for:08/08/23    What physician is performing your procedure:Dr. Ervin Solis    Phone Number:[phone]    Fax number to send the letter: [phone]    PT last ov 07/13/23 npam  PT stated Dr. Ervin Solis wants pt off Xarelto for 3 days before procedure

## 2023-07-31 NOTE — TELEPHONE ENCOUNTER
Ideally would not want to hold Xarelto for more than 48 hours given stroke risk. Okay to hold for 3 days as long as patient clearly understands that his stroke risk is much higher off of anticoagulation.

## 2023-08-01 NOTE — TELEPHONE ENCOUNTER
Called patient lvm for patient to return my call regarding cardiac clearance. Clearance was faxed over to Dr. Bridgette Singh office on 8/1/23, and scanned into media manager.

## 2023-08-17 ENCOUNTER — OFFICE VISIT (OUTPATIENT)
Dept: FAMILY MEDICINE CLINIC | Age: 73
End: 2023-08-17
Payer: MEDICARE

## 2023-08-17 VITALS
SYSTOLIC BLOOD PRESSURE: 122 MMHG | HEART RATE: 88 BPM | BODY MASS INDEX: 43.6 KG/M2 | HEIGHT: 71 IN | WEIGHT: 311.4 LBS | DIASTOLIC BLOOD PRESSURE: 76 MMHG | OXYGEN SATURATION: 97 %

## 2023-08-17 DIAGNOSIS — I48.0 PAROXYSMAL ATRIAL FIBRILLATION (HCC): Primary | ICD-10-CM

## 2023-08-17 DIAGNOSIS — E66.01 MORBID OBESITY WITH BMI OF 40.0-44.9, ADULT (HCC): ICD-10-CM

## 2023-08-17 DIAGNOSIS — M54.42 CHRONIC LEFT-SIDED LOW BACK PAIN WITH LEFT-SIDED SCIATICA: ICD-10-CM

## 2023-08-17 DIAGNOSIS — G47.30 ORGANIC SLEEP APNEA: ICD-10-CM

## 2023-08-17 DIAGNOSIS — G89.29 CHRONIC LEFT-SIDED LOW BACK PAIN WITH LEFT-SIDED SCIATICA: ICD-10-CM

## 2023-08-17 PROBLEM — F33.9 MAJOR DEPRESSIVE DISORDER, RECURRENT, UNSPECIFIED (HCC): Status: RESOLVED | Noted: 2023-07-13 | Resolved: 2023-08-17

## 2023-08-17 PROBLEM — F33.1 MAJOR DEPRESSIVE DISORDER, RECURRENT, MODERATE (HCC): Status: RESOLVED | Noted: 2023-07-13 | Resolved: 2023-08-17

## 2023-08-17 PROBLEM — F33.0 MAJOR DEPRESSIVE DISORDER, RECURRENT, MILD (HCC): Status: RESOLVED | Noted: 2023-07-13 | Resolved: 2023-08-17

## 2023-08-17 LAB
BASOPHILS # BLD: 0 K/UL (ref 0–0.2)
BASOPHILS NFR BLD: 0.6 %
DEPRECATED RDW RBC AUTO: 14.9 % (ref 12.4–15.4)
EOSINOPHIL # BLD: 0.2 K/UL (ref 0–0.6)
EOSINOPHIL NFR BLD: 2.5 %
HCT VFR BLD AUTO: 42.4 % (ref 40.5–52.5)
HGB BLD-MCNC: 14.3 G/DL (ref 13.5–17.5)
LYMPHOCYTES # BLD: 1.5 K/UL (ref 1–5.1)
LYMPHOCYTES NFR BLD: 22.4 %
MCH RBC QN AUTO: 31 PG (ref 26–34)
MCHC RBC AUTO-ENTMCNC: 33.8 G/DL (ref 31–36)
MCV RBC AUTO: 91.9 FL (ref 80–100)
MONOCYTES # BLD: 0.5 K/UL (ref 0–1.3)
MONOCYTES NFR BLD: 6.9 %
NEUTROPHILS # BLD: 4.5 K/UL (ref 1.7–7.7)
NEUTROPHILS NFR BLD: 67.6 %
PLATELET # BLD AUTO: 155 K/UL (ref 135–450)
PMV BLD AUTO: 7.4 FL (ref 5–10.5)
RBC # BLD AUTO: 4.62 M/UL (ref 4.2–5.9)
WBC # BLD AUTO: 6.6 K/UL (ref 4–11)

## 2023-08-17 PROCEDURE — 36415 COLL VENOUS BLD VENIPUNCTURE: CPT | Performed by: FAMILY MEDICINE

## 2023-08-17 PROCEDURE — 99213 OFFICE O/P EST LOW 20 MIN: CPT | Performed by: FAMILY MEDICINE

## 2023-08-17 PROCEDURE — 1123F ACP DISCUSS/DSCN MKR DOCD: CPT | Performed by: FAMILY MEDICINE

## 2023-08-17 ASSESSMENT — ENCOUNTER SYMPTOMS: BACK PAIN: 1

## 2023-08-17 NOTE — PROGRESS NOTES
education level: Not on file   Occupational History    Not on file   Tobacco Use    Smoking status: Former     Packs/day: 1.50     Years: 30.00     Pack years: 45.00     Types: Cigarettes     Quit date: 1988     Years since quittin.6    Smokeless tobacco: Never   Vaping Use    Vaping Use: Never used   Substance and Sexual Activity    Alcohol use: No     Alcohol/week: 0.0 standard drinks    Drug use: No    Sexual activity: Not on file   Other Topics Concern    Not on file   Social History Narrative    Not on file     Social Determinants of Health     Financial Resource Strain: Low Risk     Difficulty of Paying Living Expenses: Not hard at all   Food Insecurity: No Food Insecurity    Worried About [place] in the Last Year: Never true    [address] in the Last Year: Never true   Transportation Needs: Unknown    Lack of Transportation (Medical): Not on file    Lack of Transportation (Non-Medical): No   Physical Activity: Not on file   Stress: Not on file   Social Connections: Not on file   Intimate Partner Violence: Not on file   Housing Stability: Unknown    Unable to Pay for Housing in the Last Year: Not on file    Number of Places Lived in the Last Year: Not on file    Unstable Housing in the Last Year: No       Prior to Visit Medications    Medication Sig Taking?  Authorizing Provider   traZODone (DESYREL) 50 MG tablet Take 1 tablet by mouth nightly Yes Alexandr Asif MD   rivaroxaban (XARELTO) 20 MG TABS tablet Take 1 tablet by mouth daily (with breakfast) Yes CHARISSE Albarado CNP   metoprolol succinate (TOPROL XL) 50 MG extended release tablet Take 1 tablet by mouth daily Yes CHARISSE Albarado CNP   acetaminophen (TYLENOL) 500 MG tablet Take 1 tablet by mouth 4 times daily as needed for Pain Yes Bobby Alcala PA-C       Allergies   Allergen Reactions    Bee Venom     Adhesive Tape Rash       OBJECTIVE:    /76   Pulse 88   Ht 5' 11\" (1.803 m)   Wt (!) 311 lb 6.4 oz

## 2023-08-22 ENCOUNTER — SCHEDULED TELEPHONE ENCOUNTER (OUTPATIENT)
Dept: FAMILY MEDICINE CLINIC | Age: 73
End: 2023-08-22
Payer: MEDICARE

## 2023-08-22 DIAGNOSIS — U07.1 COVID: Primary | ICD-10-CM

## 2023-08-22 PROCEDURE — 98968 PH1 ASSMT&MGMT NQHP 21-30: CPT

## 2023-08-22 RX ORDER — AZITHROMYCIN 250 MG/1
250 TABLET, FILM COATED ORAL SEE ADMIN INSTRUCTIONS
Qty: 6 TABLET | Refills: 0 | Status: SHIPPED | OUTPATIENT
Start: 2023-08-22 | End: 2023-08-27

## 2023-08-22 NOTE — PROGRESS NOTES
Ashley Padilla is a 74 y.o. male evaluated via telephone on 8/22/2023. Consent:  He and/or health care decision maker is aware that that he may receive a bill for this telephone service, depending on his insurance coverage, and has provided verbal consent to proceed: Yes      Documentation:  I communicated with the patient and/or health care decision maker about current medical symptoms and concerns. Details of this discussion including any medical advice provided:     Since Sunday has had sore throat, fever, diaphoresis, body aches, cough with dark colored sputum. Feels if not SOB or wheezing. Test for covid this AM and was positive. Feels terrible at this time.     Active Ambulatory Problems     Diagnosis Date Noted    Kidney stone     Obesity     Organic sleep apnea     Back pain 06/25/2015    Morbid obesity with BMI of 40.0-44.9, adult ([address] 45/29/1584    Renal colic on right side 95/54/3052    Paroxysmal atrial fibrillation (HCC)     Cellulitis and abscess of buttock     Chest pain 03/05/2020    Sacroiliitis ([address]) 02/07/2023     Resolved Ambulatory Problems     Diagnosis Date Noted    Major depressive disorder, recurrent, mild 07/13/2023    Major depressive disorder, recurrent, moderate 07/13/2023    Major depressive disorder, recurrent, unspecified 07/13/2023     Past Medical History:   Diagnosis Date    Arthritis     Atrial fibrillation ([address]     Hiatal hernia     Hypertrophy of prostate without urinary obstruction and other lower urinary tract symptoms (LUTS)     Neoplasm of uncertain behavior of skin     Obesity, unspecified     Organic sleep apnea, unspecified      Current Outpatient Medications on File Prior to Visit   Medication Sig Dispense Refill    rivaroxaban (XARELTO) 20 MG TABS tablet Take 1 tablet by mouth daily (with breakfast) 90 tablet 3    metoprolol succinate (TOPROL XL) 50 MG extended release tablet Take 1 tablet by mouth daily 90 tablet 3    acetaminophen (TYLENOL) 500 MG tablet Take

## 2023-10-12 DIAGNOSIS — I48.0 PAROXYSMAL ATRIAL FIBRILLATION (HCC): ICD-10-CM

## 2023-10-13 RX ORDER — METOPROLOL SUCCINATE 50 MG/1
50 TABLET, EXTENDED RELEASE ORAL DAILY
Qty: 90 TABLET | Refills: 3 | Status: SHIPPED | OUTPATIENT
Start: 2023-10-13

## 2023-10-19 ENCOUNTER — TELEPHONE (OUTPATIENT)
Dept: CARDIOLOGY CLINIC | Age: 73
End: 2023-10-19

## 2023-10-19 NOTE — TELEPHONE ENCOUNTER
CARDIAC CLEARANCE REQUEST    What type of procedure are you having:  Having 2 teeth extracted   Are you taking any blood thinners:  Xarelto  Type on anesthesia:  Local   When is your procedure scheduled for:  Not scheduled yet  What physician is performing your procedure:  Dr. Gaurang Brown  Phone Number:  [phone]  Fax number to send the letter:   [phone]

## 2023-10-31 ENCOUNTER — TELEPHONE (OUTPATIENT)
Dept: CARDIOLOGY CLINIC | Age: 73
End: 2023-10-31

## 2023-10-31 NOTE — TELEPHONE ENCOUNTER
CARDIAC CLEARANCE REQUEST    What type of procedure are you having:  Tooth extractions  Are you taking any blood thinners:  Xarelto  Type on anesthesia:    When is your procedure scheduled for:  Not yet scheduled   What physician is performing your procedure:  Dr. Kvng Chang  Phone Number:  [phone]  Fax number to send the letter:  [phone]    Last ov 07/13/2023 bryan

## 2023-11-02 NOTE — TELEPHONE ENCOUNTER
Spoke with Dental office they state pt will be given local anesthesia.  Pt is having 2 teeth extracted

## 2023-11-21 DIAGNOSIS — G89.29 CHRONIC MIDLINE LOW BACK PAIN WITHOUT SCIATICA: ICD-10-CM

## 2023-11-21 DIAGNOSIS — M54.50 CHRONIC MIDLINE LOW BACK PAIN WITHOUT SCIATICA: ICD-10-CM

## 2023-11-21 RX ORDER — HYDROCODONE BITARTRATE AND ACETAMINOPHEN 5; 325 MG/1; MG/1
1 TABLET ORAL EVERY 6 HOURS PRN
Qty: 20 TABLET | Refills: 0 | Status: SHIPPED | OUTPATIENT
Start: 2023-11-21 | End: 2023-11-28

## 2023-11-21 NOTE — TELEPHONE ENCOUNTER
Date of last refill of this med was 07/06/23, # of pills given 20 and # of refills given 0. Their next appointment is 0, the last date patient was seen was 08/17/23. Does patient have medication agreement on file? Yes  Has drug screen been done in last 12 months if needed?  N/A

## 2023-11-25 DIAGNOSIS — G47.00 INSOMNIA, UNSPECIFIED TYPE: ICD-10-CM

## 2023-11-27 RX ORDER — TRAZODONE HYDROCHLORIDE 50 MG/1
50 TABLET ORAL NIGHTLY
Qty: 90 TABLET | Refills: 1 | Status: SHIPPED | OUTPATIENT
Start: 2023-11-27

## 2023-11-27 NOTE — TELEPHONE ENCOUNTER
Patient:   KARLA MARIE            MRN: [id number]            FIN: [id number]              Age:   59 years     Sex:  FEMALE     :  57   Associated Diagnoses:   None   Author:   JO SHIELDS     Postoperative Information     Postoperative Information   Postoperative Information:          Preoperative Diagnosis: cad.         Postoperative Diagnosis: Same As Pre-Op Dx.         Performed by: JO SHIELDS.         Assistant: none, Dolly Frazier RT.         Surgical Tasks Performed by Assistant: None.         Procedures Performed: cath.         Findings: cad.         Specimens Removed: None.         Estimated Blood Loss: 0  ml.         Blood Administered: No.         Complications: None.         Grafts/Implants: None.    Anesthetic utilized:  Conscious sedation.   Last OV 8/22/23

## 2023-11-28 ENCOUNTER — TELEPHONE (OUTPATIENT)
Dept: CARDIOLOGY CLINIC | Age: 73
End: 2023-11-28

## 2023-11-28 NOTE — TELEPHONE ENCOUNTER
Pt states he is out of Xarelto and [facility] is too [facility] Pt states he does not qualify for the financial assistance. Please advise.

## 2023-11-29 NOTE — TELEPHONE ENCOUNTER
LVM for patient. Samples have been set aside for patient for this one occurrence.     Please inform patient of sample policy and that if long term cost issues would need to consider switching to Coumadin (per NPAM message). Thanks.

## 2023-11-30 NOTE — TELEPHONE ENCOUNTER
Pt returned call. Message given. Pt does not want to take Coumadin due to frequent visits for lab draw. Pt sts he thinks he should be out of the donut hole first of the year.

## 2023-12-13 ENCOUNTER — TELEPHONE (OUTPATIENT)
Dept: FAMILY MEDICINE CLINIC | Age: 73
End: 2023-12-13

## 2024-01-04 RX ORDER — RIVAROXABAN 20 MG/1
20 TABLET, FILM COATED ORAL
Qty: 90 TABLET | Refills: 2 | Status: SHIPPED | OUTPATIENT
Start: 2024-01-04

## 2024-03-22 DIAGNOSIS — G89.29 CHRONIC MIDLINE LOW BACK PAIN WITHOUT SCIATICA: ICD-10-CM

## 2024-03-22 DIAGNOSIS — M54.50 CHRONIC MIDLINE LOW BACK PAIN WITHOUT SCIATICA: ICD-10-CM

## 2024-03-22 RX ORDER — HYDROCODONE BITARTRATE AND ACETAMINOPHEN 5; 325 MG/1; MG/1
1 TABLET ORAL EVERY 6 HOURS PRN
Qty: 20 TABLET | Refills: 0 | Status: SHIPPED | OUTPATIENT
Start: 2024-03-22 | End: 2024-03-29

## 2024-05-01 ENCOUNTER — HOSPITAL ENCOUNTER (OUTPATIENT)
Dept: MRI IMAGING | Age: 74
Discharge: HOME | End: 2024-05-01
Payer: MEDICARE

## 2024-05-01 DIAGNOSIS — M51.36 DEGENERATION OF LUMBAR INTERVERTEBRAL DISC: ICD-10-CM

## 2024-05-01 DIAGNOSIS — M48.10 DISH (DISSEMINATED IDIOPATHIC SKELETAL HYPEROSTOSIS): ICD-10-CM

## 2024-05-01 PROCEDURE — 72148 MRI LUMBAR SPINE W/O DYE: CPT

## 2024-05-08 DIAGNOSIS — G89.29 CHRONIC MIDLINE LOW BACK PAIN WITHOUT SCIATICA: ICD-10-CM

## 2024-05-08 DIAGNOSIS — M54.50 CHRONIC MIDLINE LOW BACK PAIN WITHOUT SCIATICA: ICD-10-CM

## 2024-05-08 RX ORDER — HYDROCODONE BITARTRATE AND ACETAMINOPHEN 5; 325 MG/1; MG/1
1 TABLET ORAL EVERY 6 HOURS PRN
Qty: 20 TABLET | Refills: 0 | Status: SHIPPED | OUTPATIENT
Start: 2024-05-08 | End: 2024-05-15

## 2024-05-08 NOTE — TELEPHONE ENCOUNTER
Patient called Dr. Waters's office to check about getting a refill on his pain medication.  They told patient that he would need to come in for an appointment prior to them prescribing anything.  Patient says he is completley out and is wondering if Dr. Carpio could refill it for him.  Patient did not make an appt with Dr. Waters.

## 2024-05-15 ENCOUNTER — TELEPHONE (OUTPATIENT)
Dept: CARDIOLOGY CLINIC | Age: 74
End: 2024-05-15

## 2024-05-15 NOTE — TELEPHONE ENCOUNTER
CARDIAC CLEARANCE REQUEST    What type of procedure are you having:  Epidural shot  Are you taking any blood thinners:  Xarelto- 3 days  Type on anesthesia:    When is your procedure scheduled for:  5/21/2024  What physician is performing your procedure:  Dr. Levin   Phone Number:  [phone]  Fax number to send the letter:  [phone]    Last ov 07/13/2023 npam   Next ov 07/09/2024 krysten    Pt would like a call back once reviewed

## 2024-05-15 NOTE — TELEPHONE ENCOUNTER
He is at an increased risk of perioperative complications due to multiple comorbidities. However, there are no absolute contraindications to proceeding.     Ideally would not want to hold Xarelto for more than 48 hours given stroke risk.  Okay to hold for 3 days as long as patient clearly understands that his stroke risk is much higher off of anticoagulation.

## 2024-06-05 DIAGNOSIS — G47.00 INSOMNIA, UNSPECIFIED TYPE: ICD-10-CM

## 2024-06-05 RX ORDER — TRAZODONE HYDROCHLORIDE 50 MG/1
50 TABLET ORAL NIGHTLY
Qty: 90 TABLET | Refills: 1 | Status: SHIPPED | OUTPATIENT
Start: 2024-06-05

## 2024-07-08 NOTE — PROGRESS NOTES
ventricular diastolic filling pressure.   Right ventricular systolic function is mildly reduced to low normal.   The right ventricle is mildly enlarged.   The right atrium is mildly dilated.   Mild mitral regurgitation.   Mild aortic regurgitation.   Mild tricuspid regurgitation.   Normal systolic pulmonary artery pressure (SPAP) estimated at 36 mmHg (RA   pressure 8 mmHg).   Mild pulmonic regurgitation.    Objective:  Physical Exam   Constitutional: He is oriented to person, place, and time. He appears well-developed and well-nourished.   HENT:   Head: Normocephalic and atraumatic.   Eyes: Pupils are equal, round, and reactive to light.   Neck: Normal range of motion.   Cardiovascular: Normal rate, irregular rhythm and normal heart sounds.    Pulmonary/Chest: Effort normal and breath sounds normal.   Abdominal: Soft. No tenderness.   Musculoskeletal: Normal range of motion. He exhibits no edema.   Neurological: He is alert and oriented to person, place, and time.   Skin: Skin is warm and dry.   Psychiatric: He has a normal mood and affect.     Assessment:  AF-persistent. No AF since the PVI 1/22/20.  He is not currently taking antiarrhythmic medication. Continue metoprolol and xarelto.   PACs [facility] RBBB- per ECG.   Obesity    Plan:  Continue cardiac medications as prescribed.   -refills sent today  Encourage continued activity as tolerated.   Follow up with EP NP in 6 months.       Scribe's attestation:  This note was scribed in the presence of Dr. Conor Burden MD by Mary Rosenbaum RN    I, Dr. Conor Burden, personally performed the services described in this documentation as scribed by Mary Rosenbaum RN in my presence, and it is both accurate and complete.       Conor Burden MD

## 2024-07-09 ENCOUNTER — OFFICE VISIT (OUTPATIENT)
Dept: CARDIOLOGY CLINIC | Age: 74
End: 2024-07-09

## 2024-07-09 VITALS
DIASTOLIC BLOOD PRESSURE: 74 MMHG | HEIGHT: 71 IN | SYSTOLIC BLOOD PRESSURE: 118 MMHG | WEIGHT: 315 LBS | OXYGEN SATURATION: 96 % | BODY MASS INDEX: 44.1 KG/M2 | HEART RATE: 76 BPM

## 2024-07-09 DIAGNOSIS — I48.0 PAROXYSMAL ATRIAL FIBRILLATION (HCC): Primary | ICD-10-CM

## 2024-07-09 RX ORDER — METOPROLOL SUCCINATE 50 MG/1
50 TABLET, EXTENDED RELEASE ORAL DAILY
Qty: 90 TABLET | Refills: 3 | Status: SHIPPED | OUTPATIENT
Start: 2024-07-09

## 2024-07-09 NOTE — PATIENT INSTRUCTIONS
Plan:  Continue cardiac medications as prescribed.   -refills sent today  Encourage continued activity as tolerated.   Follow up with EP NP in 6 months.

## 2024-12-12 DIAGNOSIS — M54.50 CHRONIC MIDLINE LOW BACK PAIN WITHOUT SCIATICA: ICD-10-CM

## 2024-12-12 DIAGNOSIS — G89.29 CHRONIC MIDLINE LOW BACK PAIN WITHOUT SCIATICA: ICD-10-CM

## 2024-12-12 RX ORDER — HYDROCODONE BITARTRATE AND ACETAMINOPHEN 5; 325 MG/1; MG/1
1 TABLET ORAL EVERY 6 HOURS PRN
Qty: 20 TABLET | Refills: 0 | Status: SHIPPED | OUTPATIENT
Start: 2024-12-12 | End: 2024-12-19

## 2024-12-19 ENCOUNTER — OFFICE VISIT (OUTPATIENT)
Dept: FAMILY MEDICINE CLINIC | Age: 74
End: 2024-12-19

## 2024-12-19 VITALS
HEART RATE: 82 BPM | BODY MASS INDEX: 44.1 KG/M2 | DIASTOLIC BLOOD PRESSURE: 82 MMHG | HEIGHT: 71 IN | OXYGEN SATURATION: 97 % | WEIGHT: 315 LBS | SYSTOLIC BLOOD PRESSURE: 130 MMHG

## 2024-12-19 DIAGNOSIS — M25.552 LEFT HIP PAIN: ICD-10-CM

## 2024-12-19 DIAGNOSIS — I48.0 PAROXYSMAL ATRIAL FIBRILLATION (HCC): Primary | ICD-10-CM

## 2024-12-19 DIAGNOSIS — M54.50 CHRONIC MIDLINE LOW BACK PAIN WITHOUT SCIATICA: ICD-10-CM

## 2024-12-19 DIAGNOSIS — E66.813 CLASS 3 SEVERE OBESITY WITH SERIOUS COMORBIDITY AND BODY MASS INDEX (BMI) OF 45.0 TO 49.9 IN ADULT, UNSPECIFIED OBESITY TYPE: ICD-10-CM

## 2024-12-19 DIAGNOSIS — G47.33 OSA (OBSTRUCTIVE SLEEP APNEA): ICD-10-CM

## 2024-12-19 DIAGNOSIS — E66.01 CLASS 3 SEVERE OBESITY WITH SERIOUS COMORBIDITY AND BODY MASS INDEX (BMI) OF 45.0 TO 49.9 IN ADULT, UNSPECIFIED OBESITY TYPE: ICD-10-CM

## 2024-12-19 DIAGNOSIS — G89.29 CHRONIC MIDLINE LOW BACK PAIN WITHOUT SCIATICA: ICD-10-CM

## 2024-12-19 PROBLEM — M46.1 SACROILIITIS (HCC): Status: RESOLVED | Noted: 2023-02-07 | Resolved: 2024-12-19

## 2024-12-19 RX ORDER — METHYLPREDNISOLONE 4 MG/1
TABLET ORAL
Qty: 1 KIT | Refills: 0 | Status: SHIPPED | OUTPATIENT
Start: 2024-12-19 | End: 2024-12-25

## 2024-12-19 SDOH — ECONOMIC STABILITY: FOOD INSECURITY: WITHIN THE PAST 12 MONTHS, THE FOOD YOU BOUGHT JUST DIDN'T LAST AND YOU DIDN'T HAVE MONEY TO GET MORE.: NEVER TRUE

## 2024-12-19 SDOH — ECONOMIC STABILITY: FOOD INSECURITY: WITHIN THE PAST 12 MONTHS, YOU WORRIED THAT YOUR FOOD WOULD RUN OUT BEFORE YOU GOT MONEY TO BUY MORE.: NEVER TRUE

## 2024-12-19 SDOH — ECONOMIC STABILITY: INCOME INSECURITY: HOW HARD IS IT FOR YOU TO PAY FOR THE VERY BASICS LIKE FOOD, HOUSING, MEDICAL CARE, AND HEATING?: NOT HARD AT ALL

## 2024-12-19 ASSESSMENT — PATIENT HEALTH QUESTIONNAIRE - PHQ9
2. FEELING DOWN, DEPRESSED OR HOPELESS: NOT AT ALL
SUM OF ALL RESPONSES TO PHQ QUESTIONS 1-9: 0
SUM OF ALL RESPONSES TO PHQ QUESTIONS 1-9: 0
1. LITTLE INTEREST OR PLEASURE IN DOING THINGS: NOT AT ALL
SUM OF ALL RESPONSES TO PHQ9 QUESTIONS 1 & 2: 0
SUM OF ALL RESPONSES TO PHQ QUESTIONS 1-9: 0
SUM OF ALL RESPONSES TO PHQ QUESTIONS 1-9: 0

## 2024-12-19 ASSESSMENT — ENCOUNTER SYMPTOMS
BACK PAIN: 1
CONSTIPATION: 0
BLOOD IN STOOL: 0
SHORTNESS OF BREATH: 0
DIARRHEA: 0

## 2024-12-19 NOTE — PROGRESS NOTES
Chief Complaint   Patient presents with    Check-Up       HPI:  Sonu King is a 74 y.o. (: 1950) here today   for check up.  HPI  Hx of afib.  No sxs of palpitations.  Still on xarelto.  No new issues.  Has been on metoprolol as well.  Prior hx of ablation and did well for a period of time, but back in afib last cardio appt.      Ongoing issues w/ back pain.  Has been having sig issues this week.  Recently refilled pain med.  No known injury, but does lift w/ work.  Had MRI and rpt epidurals earlier this yr. Did not provide much extended relief.      Had gained sig weight previously.  Has tried to cut down on junk food and portion sizes.      Patient's medications, allergies, past medical, surgical, social and family histories were reviewed and updated as appropriate.    ROS:  Review of Systems   Constitutional:  Negative for fever.   HENT:  Positive for congestion.    Respiratory:  Negative for shortness of breath.    Cardiovascular:  Negative for palpitations.   Gastrointestinal:  Negative for blood in stool, constipation and diarrhea.   Musculoskeletal:  Positive for back pain.           Hemoglobin A1C (%)   Date Value   2023 4.5       Past Medical History:   Diagnosis Date    Arthritis     Atrial fibrillation (HCC)     Hiatal hernia     Hypertrophy of prostate without urinary obstruction and other lower urinary tract symptoms (LUTS)     Kidney stone         Neoplasm of uncertain behavior of skin     Obesity, unspecified     Organic sleep apnea, unspecified        Family History   Problem Relation Age of Onset    Cancer Mother         breast met to bone    Emphysema Father     Cancer Maternal Uncle     Cancer Maternal Grandmother     Heart Failure Maternal Grandfather     Asthma Neg Hx     Diabetes Neg Hx     Hypertension Neg Hx        Social History     Socioeconomic History    Marital status:      Spouse name: Not on file    Number of children: Not on file    Years of education: Not

## 2024-12-20 DIAGNOSIS — D64.9 ANEMIA, UNSPECIFIED TYPE: Primary | ICD-10-CM

## 2024-12-20 LAB
ALBUMIN SERPL-MCNC: 4 G/DL (ref 3.4–5)
ALBUMIN/GLOB SERPL: 1.8 {RATIO} (ref 1.1–2.2)
ALP SERPL-CCNC: 67 U/L (ref 40–129)
ALT SERPL-CCNC: 22 U/L (ref 10–40)
ANION GAP SERPL CALCULATED.3IONS-SCNC: 9 MMOL/L (ref 3–16)
AST SERPL-CCNC: 26 U/L (ref 15–37)
BASOPHILS # BLD: 0 K/UL (ref 0–0.2)
BASOPHILS NFR BLD: 0.4 %
BILIRUB SERPL-MCNC: 0.6 MG/DL (ref 0–1)
BUN SERPL-MCNC: 19 MG/DL (ref 7–20)
CALCIUM SERPL-MCNC: 8.9 MG/DL (ref 8.3–10.6)
CHLORIDE SERPL-SCNC: 107 MMOL/L (ref 99–110)
CO2 SERPL-SCNC: 28 MMOL/L (ref 21–32)
CREAT SERPL-MCNC: 0.8 MG/DL (ref 0.8–1.3)
DEPRECATED RDW RBC AUTO: 14.7 % (ref 12.4–15.4)
EOSINOPHIL # BLD: 0.1 K/UL (ref 0–0.6)
EOSINOPHIL NFR BLD: 2.2 %
GFR SERPLBLD CREATININE-BSD FMLA CKD-EPI: >90 ML/MIN/{1.73_M2}
GLUCOSE SERPL-MCNC: 102 MG/DL (ref 70–99)
HCT VFR BLD AUTO: 39.2 % (ref 40.5–52.5)
HGB BLD-MCNC: 13 G/DL (ref 13.5–17.5)
LYMPHOCYTES # BLD: 0.9 K/UL (ref 1–5.1)
LYMPHOCYTES NFR BLD: 19.7 %
MCH RBC QN AUTO: 31 PG (ref 26–34)
MCHC RBC AUTO-ENTMCNC: 33.2 G/DL (ref 31–36)
MCV RBC AUTO: 93.5 FL (ref 80–100)
MONOCYTES # BLD: 0.2 K/UL (ref 0–1.3)
MONOCYTES NFR BLD: 5.3 %
NEUTROPHILS # BLD: 3.4 K/UL (ref 1.7–7.7)
NEUTROPHILS NFR BLD: 72.4 %
PLATELET # BLD AUTO: 138 K/UL (ref 135–450)
PMV BLD AUTO: 7.1 FL (ref 5–10.5)
POTASSIUM SERPL-SCNC: 4.5 MMOL/L (ref 3.5–5.1)
PROT SERPL-MCNC: 6.2 G/DL (ref 6.4–8.2)
RBC # BLD AUTO: 4.19 M/UL (ref 4.2–5.9)
SODIUM SERPL-SCNC: 144 MMOL/L (ref 136–145)
WBC # BLD AUTO: 4.7 K/UL (ref 4–11)

## 2024-12-20 NOTE — RESULT ENCOUNTER NOTE
Mild anemia with hemoglobin of 13.  He has been in a similar range in the past.  CMP is essentially normal.  Recommend check stool for occult blood.

## 2024-12-30 DIAGNOSIS — G89.29 CHRONIC MIDLINE LOW BACK PAIN WITHOUT SCIATICA: ICD-10-CM

## 2024-12-30 DIAGNOSIS — M54.50 CHRONIC MIDLINE LOW BACK PAIN WITHOUT SCIATICA: ICD-10-CM

## 2024-12-30 RX ORDER — HYDROCODONE BITARTRATE AND ACETAMINOPHEN 5; 325 MG/1; MG/1
1 TABLET ORAL EVERY 6 HOURS PRN
Qty: 20 TABLET | Refills: 0 | Status: SHIPPED | OUTPATIENT
Start: 2024-12-30 | End: 2025-01-06

## 2024-12-31 ENCOUNTER — TELEPHONE (OUTPATIENT)
Dept: ORTHOPEDIC SURGERY | Age: 74
End: 2024-12-31

## 2024-12-31 NOTE — TELEPHONE ENCOUNTER
Spoke to patient and let him know that he does have the earliest appointment available as of right now however if that changes I will give him a call.

## 2024-12-31 NOTE — TELEPHONE ENCOUNTER
Appointment Request     Patient requesting earlier appointment: Yes  Appointment offered to patient: 01/29/2025  Patient Contact Number: [phone]    PATIENT IS HAVING TROUBLE WALKING, LAYING DOWN,STANDING UP AND SITTING. WOULD LIKE TO BE WORKED IN SOONER IN [place]

## 2025-01-14 ENCOUNTER — OFFICE VISIT (OUTPATIENT)
Dept: FAMILY MEDICINE CLINIC | Age: 75
End: 2025-01-14
Payer: MEDICARE

## 2025-01-14 VITALS
OXYGEN SATURATION: 95 % | HEART RATE: 78 BPM | BODY MASS INDEX: 44.1 KG/M2 | HEIGHT: 71 IN | WEIGHT: 315 LBS | DIASTOLIC BLOOD PRESSURE: 72 MMHG | SYSTOLIC BLOOD PRESSURE: 130 MMHG

## 2025-01-14 DIAGNOSIS — R60.0 BILATERAL LEG EDEMA: Primary | ICD-10-CM

## 2025-01-14 DIAGNOSIS — D64.9 ANEMIA, UNSPECIFIED TYPE: ICD-10-CM

## 2025-01-14 PROCEDURE — 1159F MED LIST DOCD IN RCRD: CPT | Performed by: FAMILY MEDICINE

## 2025-01-14 PROCEDURE — 99213 OFFICE O/P EST LOW 20 MIN: CPT | Performed by: FAMILY MEDICINE

## 2025-01-14 PROCEDURE — 36415 COLL VENOUS BLD VENIPUNCTURE: CPT | Performed by: FAMILY MEDICINE

## 2025-01-14 PROCEDURE — 1123F ACP DISCUSS/DSCN MKR DOCD: CPT | Performed by: FAMILY MEDICINE

## 2025-01-14 RX ORDER — TORSEMIDE 10 MG/1
10 TABLET ORAL DAILY
Qty: 30 TABLET | Refills: 3 | Status: SHIPPED | OUTPATIENT
Start: 2025-01-14

## 2025-01-14 RX ORDER — HYDROCODONE BITARTRATE AND ACETAMINOPHEN 5; 325 MG/1; MG/1
1 TABLET ORAL EVERY 6 HOURS PRN
COMMUNITY

## 2025-01-14 SDOH — ECONOMIC STABILITY: FOOD INSECURITY: WITHIN THE PAST 12 MONTHS, THE FOOD YOU BOUGHT JUST DIDN'T LAST AND YOU DIDN'T HAVE MONEY TO GET MORE.: NEVER TRUE

## 2025-01-14 SDOH — ECONOMIC STABILITY: FOOD INSECURITY: WITHIN THE PAST 12 MONTHS, YOU WORRIED THAT YOUR FOOD WOULD RUN OUT BEFORE YOU GOT MONEY TO BUY MORE.: NEVER TRUE

## 2025-01-14 ASSESSMENT — PATIENT HEALTH QUESTIONNAIRE - PHQ9
SUM OF ALL RESPONSES TO PHQ QUESTIONS 1-9: 0
1. LITTLE INTEREST OR PLEASURE IN DOING THINGS: NOT AT ALL
SUM OF ALL RESPONSES TO PHQ QUESTIONS 1-9: 0
SUM OF ALL RESPONSES TO PHQ9 QUESTIONS 1 & 2: 0
SUM OF ALL RESPONSES TO PHQ QUESTIONS 1-9: 0
SUM OF ALL RESPONSES TO PHQ QUESTIONS 1-9: 0
2. FEELING DOWN, DEPRESSED OR HOPELESS: NOT AT ALL

## 2025-01-14 ASSESSMENT — ENCOUNTER SYMPTOMS
SINUS PAIN: 1
SHORTNESS OF BREATH: 1
SORE THROAT: 1

## 2025-01-15 LAB
ALBUMIN SERPL-MCNC: 3.8 G/DL (ref 3.4–5)
ALBUMIN/GLOB SERPL: 1.7 {RATIO} (ref 1.1–2.2)
ALP SERPL-CCNC: 72 U/L (ref 40–129)
ALT SERPL-CCNC: 28 U/L (ref 10–40)
ANION GAP SERPL CALCULATED.3IONS-SCNC: 10 MMOL/L (ref 3–16)
AST SERPL-CCNC: 27 U/L (ref 15–37)
BASOPHILS # BLD: 0 K/UL (ref 0–0.2)
BASOPHILS NFR BLD: 0.2 %
BILIRUB SERPL-MCNC: 0.5 MG/DL (ref 0–1)
BUN SERPL-MCNC: 17 MG/DL (ref 7–20)
CALCIUM SERPL-MCNC: 8.7 MG/DL (ref 8.3–10.6)
CHLORIDE SERPL-SCNC: 109 MMOL/L (ref 99–110)
CO2 SERPL-SCNC: 28 MMOL/L (ref 21–32)
CREAT SERPL-MCNC: 0.8 MG/DL (ref 0.8–1.3)
DEPRECATED RDW RBC AUTO: 15 % (ref 12.4–15.4)
EOSINOPHIL # BLD: 0.2 K/UL (ref 0–0.6)
EOSINOPHIL NFR BLD: 3.3 %
GFR SERPLBLD CREATININE-BSD FMLA CKD-EPI: >90 ML/MIN/{1.73_M2}
GLUCOSE SERPL-MCNC: 89 MG/DL (ref 70–99)
HCT VFR BLD AUTO: 37.5 % (ref 40.5–52.5)
HGB BLD-MCNC: 12.6 G/DL (ref 13.5–17.5)
LYMPHOCYTES # BLD: 1.3 K/UL (ref 1–5.1)
LYMPHOCYTES NFR BLD: 24.7 %
MCH RBC QN AUTO: 31.3 PG (ref 26–34)
MCHC RBC AUTO-ENTMCNC: 33.7 G/DL (ref 31–36)
MCV RBC AUTO: 93 FL (ref 80–100)
MONOCYTES # BLD: 0.3 K/UL (ref 0–1.3)
MONOCYTES NFR BLD: 6.7 %
NEUTROPHILS # BLD: 3.4 K/UL (ref 1.7–7.7)
NEUTROPHILS NFR BLD: 65.1 %
PLATELET # BLD AUTO: 142 K/UL (ref 135–450)
PMV BLD AUTO: 7.4 FL (ref 5–10.5)
POTASSIUM SERPL-SCNC: 4.4 MMOL/L (ref 3.5–5.1)
PROT SERPL-MCNC: 6.1 G/DL (ref 6.4–8.2)
RBC # BLD AUTO: 4.03 M/UL (ref 4.2–5.9)
SODIUM SERPL-SCNC: 147 MMOL/L (ref 136–145)
T4 FREE SERPL-MCNC: 1 NG/DL (ref 0.9–1.8)
TSH SERPL DL<=0.005 MIU/L-ACNC: 1.62 UIU/ML (ref 0.27–4.2)
WBC # BLD AUTO: 5.2 K/UL (ref 4–11)

## 2025-01-15 NOTE — RESULT ENCOUNTER NOTE
Mild anemia with hemoglobin of 12.6.  Patient has been on Xarelto.  Recommend check stool for occult blood.  CMP and thyroid labs are okay.

## 2025-01-27 ENCOUNTER — LAB (OUTPATIENT)
Dept: FAMILY MEDICINE CLINIC | Age: 75
End: 2025-01-27
Payer: MEDICARE

## 2025-01-27 DIAGNOSIS — D64.9 ANEMIA, UNSPECIFIED TYPE: ICD-10-CM

## 2025-01-27 LAB
HEMOCCULT STL QL: NEGATIVE

## 2025-01-27 PROCEDURE — 82270 OCCULT BLOOD FECES: CPT | Performed by: FAMILY MEDICINE

## 2025-01-28 DIAGNOSIS — L02.31 CELLULITIS AND ABSCESS OF BUTTOCK: ICD-10-CM

## 2025-01-28 DIAGNOSIS — L03.317 CELLULITIS AND ABSCESS OF BUTTOCK: ICD-10-CM

## 2025-01-28 RX ORDER — MUPIROCIN 20 MG/G
OINTMENT TOPICAL
Qty: 15 G | Refills: 0 | Status: SHIPPED | OUTPATIENT
Start: 2025-01-28 | End: 2025-02-04

## 2025-01-28 NOTE — TELEPHONE ENCOUNTER
Pt states he uses this rarely for his shingles outbreaks and is almost out and really needs a refill.      Refill Request     CONFIRM preferrred pharmacy with the patient.    If Mail Order Rx - Pend for 90 day refill.      Last Seen: Last Seen Department: 1/14/2025  Last Seen by PCP: 1/14/2025    Last Written: 12/14/2021    If no future appointment scheduled, route STAFF MESSAGE with patient name to the  Pool for scheduling.      Next Appointment:   Future Appointments   Date Time Provider Department Center   1/29/2025 11:00 AM Iam Mcmanus MD [facility]   3/4/2025  2:30 PM Kandy Figueroa, APRN - CNP [facility]       Message sent to  to schedule appt with patient?  NO      Requested Prescriptions     Pending Prescriptions Disp Refills    mupirocin (BACTROBAN) 2 % ointment 15 g 0     Sig: Apply 3 times daily.

## 2025-01-29 ENCOUNTER — OFFICE VISIT (OUTPATIENT)
Dept: ORTHOPEDIC SURGERY | Age: 75
End: 2025-01-29
Payer: MEDICARE

## 2025-01-29 VITALS — WEIGHT: 315 LBS | BODY MASS INDEX: 45.1 KG/M2 | HEIGHT: 70 IN

## 2025-01-29 DIAGNOSIS — M47.816 LUMBAR SPONDYLOSIS: Primary | ICD-10-CM

## 2025-01-29 PROCEDURE — 99213 OFFICE O/P EST LOW 20 MIN: CPT | Performed by: ORTHOPAEDIC SURGERY

## 2025-01-29 PROCEDURE — 1123F ACP DISCUSS/DSCN MKR DOCD: CPT | Performed by: ORTHOPAEDIC SURGERY

## 2025-01-29 PROCEDURE — 1159F MED LIST DOCD IN RCRD: CPT | Performed by: ORTHOPAEDIC SURGERY

## 2025-01-29 NOTE — PROGRESS NOTES
New Patient: LUMBAR SPINE    Referring Provider:  No ref. provider found    CHIEF COMPLAINT:    Chief Complaint   Patient presents with    Follow-up     Lumbar        HISTORY OF PRESENT ILLNESS:    Mr. Sonu King i returns today with persistent low back pain.  Symptoms began insidiously years ago.  He rates his pain 10/10.  His pain is present with both sitting and walking.  He notes numbness tingling weakness of his left leg.  He denies saddle anesthesia and bowel or bladder abnormality.  His pain increased somewhat after a fall about a week ago but quickly returned to its baseline.    Current/Past Treatment:   Physical Therapy: no  Chiropractic:  no   Injection:  yes   Medications: zanaFlex and Norco    Past Medical History:   Past Medical History:   Diagnosis Date    Arthritis     Atrial fibrillation (HCC)     Hiatal hernia     Hypertrophy of prostate without urinary obstruction and other lower urinary tract symptoms (LUTS)     Kidney stone     2001    Neoplasm of uncertain behavior of skin     Obesity, unspecified     Organic sleep apnea, unspecified       Past Surgical History:     Past Surgical History:   Procedure Laterality Date    ABLATION OF DYSRHYTHMIC FOCUS      COLONOSCOPY  04/01/2010    HERNIA REPAIR      umbilical 1993    LITHOTRIPSY Right 09/2016    OTHER SURGICAL HISTORY Right 09/25/2016    CYSTOSCOPY, RIGHT URETEROSCOPY, HOLMIUM LASER LITHOTRIPSY, STONE MANIPULATION AND EXTRACTION    OTHER SURGICAL HISTORY  10/12/2016    cystoscopy right stent removal    PAIN MANAGEMENT PROCEDURE Bilateral 06/16/2020    BILATERAL LUMBAR THREE FOUR EPIDURAL STEROID INJECTION SITE CONFIRMED BY FLUOROSCOPY performed by ANTOINETTE Waters MD at [facility] OR    PAIN MANAGEMENT PROCEDURE Right 07/14/2020    RIGHT LUMBAR THREE FOUR EPIDURAL STEROID INJECTION SITE CONFIRMED BY FLUOROSCOPY performed by ANTOINETTE Waters MD at [facility] OR    SKIN CANCER EXCISION      TONSILLECTOMY       Current

## 2025-02-02 NOTE — PATIENT INSTRUCTIONS
No changes today     Monitor HR at home and call if consistently out of goal ranges. If consistently higher than 100 please call.      Follow up in 6 months then yearly if stable no

## 2025-02-14 PROBLEM — M47.816 SPONDYLOSIS OF LUMBAR REGION WITHOUT MYELOPATHY OR RADICULOPATHY: Status: ACTIVE | Noted: 2025-02-14

## 2025-02-25 DIAGNOSIS — M47.816 SPONDYLOSIS OF LUMBAR REGION WITHOUT MYELOPATHY OR RADICULOPATHY: Primary | ICD-10-CM

## 2025-02-25 RX ORDER — HYDROCODONE BITARTRATE AND ACETAMINOPHEN 5; 325 MG/1; MG/1
1 TABLET ORAL EVERY 6 HOURS PRN
Qty: 30 TABLET | Refills: 0 | Status: SHIPPED | OUTPATIENT
Start: 2025-02-25 | End: 2025-03-27

## 2025-03-04 ENCOUNTER — OFFICE VISIT (OUTPATIENT)
Dept: CARDIOLOGY CLINIC | Age: 75
End: 2025-03-04
Payer: MEDICARE

## 2025-03-04 VITALS
BODY MASS INDEX: 45.1 KG/M2 | SYSTOLIC BLOOD PRESSURE: 124 MMHG | OXYGEN SATURATION: 97 % | HEART RATE: 83 BPM | HEIGHT: 70 IN | WEIGHT: 315 LBS | DIASTOLIC BLOOD PRESSURE: 64 MMHG

## 2025-03-04 DIAGNOSIS — R06.02 SHORTNESS OF BREATH: ICD-10-CM

## 2025-03-04 DIAGNOSIS — I47.19 PAT (PAROXYSMAL ATRIAL TACHYCARDIA): ICD-10-CM

## 2025-03-04 DIAGNOSIS — I48.19 PERSISTENT ATRIAL FIBRILLATION (HCC): Primary | ICD-10-CM

## 2025-03-04 PROCEDURE — 1160F RVW MEDS BY RX/DR IN RCRD: CPT | Performed by: NURSE PRACTITIONER

## 2025-03-04 PROCEDURE — 99214 OFFICE O/P EST MOD 30 MIN: CPT | Performed by: NURSE PRACTITIONER

## 2025-03-04 PROCEDURE — 1159F MED LIST DOCD IN RCRD: CPT | Performed by: NURSE PRACTITIONER

## 2025-03-04 PROCEDURE — G2211 COMPLEX E/M VISIT ADD ON: HCPCS | Performed by: NURSE PRACTITIONER

## 2025-03-04 PROCEDURE — 93000 ELECTROCARDIOGRAM COMPLETE: CPT | Performed by: NURSE PRACTITIONER

## 2025-03-04 PROCEDURE — 1123F ACP DISCUSS/DSCN MKR DOCD: CPT | Performed by: NURSE PRACTITIONER

## 2025-03-04 NOTE — PROGRESS NOTES
The estimated left ventricular function is 64%.            All labs and testing reviewed.  CARDIOLOGY LABS:   CBC: No results for input(s): \"WBC\", \"HGB\", \"HCT\", \"PLT\" in the last 72 hours.  BMP: No results for input(s): \"NA\", \"K\", \"CO2\", \"BUN\", \"CREATININE\", \"LABGLOM\", \"GLUCOSE\" in the last 72 hours.  PT/INR: No results for input(s): \"PROTIME\", \"INR\" in the last 72 hours.  APTT:No results for input(s): \"APTT\" in the last 72 hours.  FASTING LIPID PANEL:  Lab Results   Component Value Date/Time    HDL 36 05/17/2022 08:04 AM    TRIG 77 05/17/2022 08:04 AM     LIVER PROFILE:No results for input(s): \"AST\", \"ALT\" in the last 72 hours.    Invalid input(s): \"ALB\"    IMPRESSION:    Patient Active Problem List   Diagnosis    Kidney stone    Obesity    Organic sleep apnea    Back pain    Morbid obesity with BMI of 40.0-44.9, adult    Renal colic on right side    Paroxysmal atrial fibrillation (HCC)    Cellulitis and abscess of buttock    Chest pain    Sacroiliitis, not elsewhere classified    Spondylosis 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 or radiculopathy       Assessment:   Persistent atrial fibrillation: stable, asymptomatic    -rate controlled on event monitor    -[id number] score 1 (age)   -failed sotalol    -s/p multiple CV (8/2018, 11/2018, 6/2019, 11/2019)   -s/p RFCA 1/2020 (Tikosyn started post procedure and stopped 4/2020)  PAT: stable, noted on event monitor 5/2020  PAC's: stable, noted on event monitor 5/2020  RBBB: stable   Nonobstructive CAD on CTA 3/2020  SAM: intolerant of CPAP  Obesity   Lower extremity swelling      Plan:   1. Continue Xarelto and Toprol   2. Annual labs up to date due 1/2026  3. Monitor BP at home and call if consistently out of goal ranges  4. Update echo given lower extremity swelling  5. Follow up in 6 months or sooner if needed    CASEY Figueroa, APRN-CNP  [facility]  [phone]

## 2025-03-04 NOTE — PATIENT INSTRUCTIONS
NO changes today     Update echocardiogram. Please call [phone] to schedule.     Monitor BP at home and call if consistently out of goal ranges    Follow up in 6 months

## 2025-03-26 ENCOUNTER — HOSPITAL ENCOUNTER (OUTPATIENT)
Dept: MRI IMAGING | Age: 75
Discharge: HOME | End: 2025-03-26
Attending: ANESTHESIOLOGY
Payer: MEDICARE

## 2025-03-26 DIAGNOSIS — M54.16 LUMBAR RADICULOPATHY: ICD-10-CM

## 2025-03-26 PROCEDURE — 72148 MRI LUMBAR SPINE W/O DYE: CPT

## 2025-04-07 DIAGNOSIS — M47.816 SPONDYLOSIS OF LUMBAR REGION WITHOUT MYELOPATHY OR RADICULOPATHY: ICD-10-CM

## 2025-04-07 RX ORDER — HYDROCODONE BITARTRATE AND ACETAMINOPHEN 5; 325 MG/1; MG/1
1 TABLET ORAL EVERY 6 HOURS PRN
Qty: 30 TABLET | Refills: 0 | Status: SHIPPED | OUTPATIENT
Start: 2025-04-07 | End: 2025-05-07

## 2025-04-07 NOTE — TELEPHONE ENCOUNTER
Date of last refill of this med was 2/25/25, # of pills given 30 and # of refills given 0.  Their next appointment is not scheduled, the last date patient was seen was 1/4/25.  Does patient have medication agreement on file? No

## 2025-04-08 PROBLEM — M54.16 LUMBAR RADICULOPATHY: Status: ACTIVE | Noted: 2025-04-08

## 2025-04-17 ENCOUNTER — HOSPITAL ENCOUNTER (OUTPATIENT)
Age: 75
Discharge: HOME | End: 2025-04-19
Payer: MEDICARE

## 2025-04-17 VITALS
SYSTOLIC BLOOD PRESSURE: 123 MMHG | DIASTOLIC BLOOD PRESSURE: 86 MMHG | BODY MASS INDEX: 44.1 KG/M2 | WEIGHT: 315 LBS | HEIGHT: 71 IN

## 2025-04-17 DIAGNOSIS — I47.19 PAT (PAROXYSMAL ATRIAL TACHYCARDIA): ICD-10-CM

## 2025-04-17 DIAGNOSIS — R06.02 SHORTNESS OF BREATH: ICD-10-CM

## 2025-04-17 DIAGNOSIS — I48.19 PERSISTENT ATRIAL FIBRILLATION (HCC): ICD-10-CM

## 2025-04-17 LAB
ECHO AO ASC DIAM: 3.7 CM
ECHO AO ASCENDING AORTA INDEX: 1.43 CM/M2
ECHO AO ROOT DIAM: 3.6 CM
ECHO AO ROOT INDEX: 1.39 CM/M2
ECHO AV CUSP MM: 2.4 CM
ECHO AV PEAK GRADIENT: 5 MMHG
ECHO AV PEAK VELOCITY: 1.1 M/S
ECHO BSA: 2.71 M2
ECHO EST RA PRESSURE: 8 MMHG
ECHO LA AREA 2C: 24 CM2
ECHO LA AREA 4C: 31 CM2
ECHO LA DIAMETER INDEX: 2.32 CM/M2
ECHO LA DIAMETER: 6 CM
ECHO LA MAJOR AXIS: 7 CM
ECHO LA MINOR AXIS: 6.1 CM
ECHO LA TO AORTIC ROOT RATIO: 1.67
ECHO LA VOL BP: 94 ML (ref 18–58)
ECHO LA VOL MOD A2C: 76 ML (ref 18–58)
ECHO LA VOL MOD A4C: 102 ML (ref 18–58)
ECHO LA VOL/BSA BIPLANE: 36 ML/M2 (ref 16–34)
ECHO LA VOLUME INDEX MOD A2C: 29 ML/M2 (ref 16–34)
ECHO LA VOLUME INDEX MOD A4C: 39 ML/M2 (ref 16–34)
ECHO LV E' LATERAL VELOCITY: 12 CM/S
ECHO LV E' SEPTAL VELOCITY: 8.92 CM/S
ECHO LV EF PHYSICIAN: 58 %
ECHO LV FRACTIONAL SHORTENING: 22 % (ref 28–44)
ECHO LV INTERNAL DIMENSION DIASTOLE INDEX: 1.74 CM/M2
ECHO LV INTERNAL DIMENSION DIASTOLIC: 4.5 CM (ref 4.2–5.9)
ECHO LV INTERNAL DIMENSION SYSTOLIC INDEX: 1.35 CM/M2
ECHO LV INTERNAL DIMENSION SYSTOLIC: 3.5 CM
ECHO LV ISOVOLUMETRIC RELAXATION TIME (IVRT): 61 MS
ECHO LV IVSD: 1.4 CM (ref 0.6–1)
ECHO LV MASS 2D: 248.4 G (ref 88–224)
ECHO LV MASS INDEX 2D: 95.9 G/M2 (ref 49–115)
ECHO LV POSTERIOR WALL DIASTOLIC: 1.4 CM (ref 0.6–1)
ECHO LV RELATIVE WALL THICKNESS RATIO: 0.62
ECHO MV E VELOCITY: 0.99 M/S
ECHO MV E/E' LATERAL: 8.25
ECHO MV E/E' RATIO (AVERAGED): 9.67
ECHO MV E/E' SEPTAL: 11.1
ECHO PV MAX VELOCITY: 1.1 M/S
ECHO PV PEAK GRADIENT: 5 MMHG
ECHO RIGHT VENTRICULAR SYSTOLIC PRESSURE (RVSP): 41 MMHG
ECHO TV PEAK GRADIENT: 1 MMHG
ECHO TV REGURGITANT MAX VELOCITY: 2.87 M/S
ECHO TV REGURGITANT PEAK GRADIENT: 33 MMHG

## 2025-04-17 PROCEDURE — 93306 TTE W/DOPPLER COMPLETE: CPT

## 2025-04-18 ENCOUNTER — RESULTS FOLLOW-UP (OUTPATIENT)
Dept: CARDIOLOGY CLINIC | Age: 75
End: 2025-04-18

## 2025-04-18 NOTE — RESULT ENCOUNTER NOTE
Please let patient know his echo showed a normal EF or squeezing function. His LV wall is thick and not allowing his heart to relax and fill appropriately. Echo showed increased left atrial pressure indicating some volume overload. Please ensure that he is taking his water pill. Encourage low sodium diet.

## 2025-04-18 NOTE — RESULT ENCOUNTER NOTE
Called and spoke with patient and relayed NPAM message. Patient had not been taking water pill every day, misses some doses if he is not going to be around home due to having to frequently urinate. Encouraged patient to take daily as prescribed and encouraged low sodium diet. Patient gave V/U.

## 2025-04-21 ENCOUNTER — TELEPHONE (OUTPATIENT)
Dept: CARDIOLOGY CLINIC | Age: 75
End: 2025-04-21

## 2025-04-21 NOTE — TELEPHONE ENCOUNTER
Pt calling to see if we received request for clearance that was to be sent on 4/18/25.  CARDIAC CLEARANCE REQUEST    What type of procedure are you having: epidural    Are you taking any blood thinners:   rivaroxaban (XARELTO) 20 MG TABS tablet      Type on anesthesia:     When is your procedure scheduled for: not scheduled till cleared    What physician is performing your procedure:  Dr Deal  Phone Number:  [phone]  Fax number to send the letter:

## 2025-04-21 NOTE — TELEPHONE ENCOUNTER
He is at an increased risk of perioperative complications due to multiple comorbidities. However, there are no absolute contraindications to proceeding. Would not hold Xarelto for more than 48 hours.

## 2025-04-24 ENCOUNTER — HOSPITAL ENCOUNTER (INPATIENT)
Age: 75
LOS: 6 days | Discharge: NURSING, REHAB OR LONG-TERM CARE | DRG: 308 | End: 2025-04-30
Attending: STUDENT IN AN ORGANIZED HEALTH CARE EDUCATION/TRAINING PROGRAM | Admitting: HOSPITALIST
Payer: MEDICARE

## 2025-04-24 DIAGNOSIS — I42.9 CARDIOMYOPATHY, UNSPECIFIED TYPE (HCC): Primary | ICD-10-CM

## 2025-04-24 DIAGNOSIS — M47.816 SPONDYLOSIS OF LUMBAR REGION WITHOUT MYELOPATHY OR RADICULOPATHY: ICD-10-CM

## 2025-04-24 PROBLEM — I48.91 ATRIAL FIBRILLATION WITH RVR (HCC): Status: ACTIVE | Noted: 2025-04-24

## 2025-04-24 PROBLEM — I48.91 ATRIAL FIBRILLATION WITH RAPID VENTRICULAR RESPONSE (HCC): Status: ACTIVE | Noted: 2025-04-24

## 2025-04-24 LAB
BILIRUB UR QL STRIP: NEGATIVE
BNP SERPL-MCNC: 1142 PG/ML (ref 0–449)
CHARACTER UR: ABNORMAL
CLARITY UR: CLEAR
COLOR UR: YELLOW
EPI CELLS #/AREA URNS HPF: ABNORMAL /HPF
GLUCOSE UR STRIP-MCNC: NEGATIVE MG/DL
HGB UR QL STRIP.AUTO: ABNORMAL
KETONES UR STRIP-MCNC: NEGATIVE MG/DL
LEUKOCYTE ESTERASE UR QL STRIP: NEGATIVE
NITRITE UR QL STRIP: NEGATIVE
PH UR STRIP: 5.5 [PH] (ref 5–8)
PROT UR STRIP-MCNC: NEGATIVE MG/DL
RBC #/AREA URNS HPF: ABNORMAL /HPF
SP GR UR STRIP: 1.02 (ref 1–1.03)
T4 FREE SERPL-MCNC: 1.3 NG/DL (ref 0.9–1.8)
TSH SERPL DL<=0.05 MIU/L-ACNC: 0.49 UIU/ML (ref 0.27–4.2)
UROBILINOGEN UR STRIP-ACNC: 0.2 EU/DL (ref 0–1)
WBC #/AREA URNS HPF: ABNORMAL /HPF

## 2025-04-24 PROCEDURE — 6370000000 HC RX 637 (ALT 250 FOR IP): Performed by: HOSPITALIST

## 2025-04-24 PROCEDURE — 36415 COLL VENOUS BLD VENIPUNCTURE: CPT

## 2025-04-24 PROCEDURE — 1200000000 HC SEMI PRIVATE

## 2025-04-24 PROCEDURE — 99223 1ST HOSP IP/OBS HIGH 75: CPT | Performed by: INTERNAL MEDICINE

## 2025-04-24 PROCEDURE — 83880 ASSAY OF NATRIURETIC PEPTIDE: CPT

## 2025-04-24 PROCEDURE — 6360000002 HC RX W HCPCS: Performed by: HOSPITALIST

## 2025-04-24 PROCEDURE — 84443 ASSAY THYROID STIM HORMONE: CPT

## 2025-04-24 PROCEDURE — 84439 ASSAY OF FREE THYROXINE: CPT

## 2025-04-24 PROCEDURE — 81001 URINALYSIS AUTO W/SCOPE: CPT

## 2025-04-24 RX ORDER — ACETAMINOPHEN 500 MG
500 TABLET ORAL 4 TIMES DAILY PRN
Status: DISCONTINUED | OUTPATIENT
Start: 2025-04-24 | End: 2025-04-24 | Stop reason: SDUPTHER

## 2025-04-24 RX ORDER — DILTIAZEM HYDROCHLORIDE 30 MG/1
30 TABLET, FILM COATED ORAL EVERY 6 HOURS SCHEDULED
Status: DISCONTINUED | OUTPATIENT
Start: 2025-04-24 | End: 2025-04-24

## 2025-04-24 RX ORDER — HYDROCODONE BITARTRATE AND ACETAMINOPHEN 5; 325 MG/1; MG/1
1 TABLET ORAL EVERY 6 HOURS PRN
Refills: 0 | Status: DISCONTINUED | OUTPATIENT
Start: 2025-04-24 | End: 2025-04-30 | Stop reason: HOSPADM

## 2025-04-24 RX ORDER — SODIUM CHLORIDE 0.9 % (FLUSH) 0.9 %
5-40 SYRINGE (ML) INJECTION EVERY 12 HOURS SCHEDULED
Status: DISCONTINUED | OUTPATIENT
Start: 2025-04-24 | End: 2025-04-30 | Stop reason: HOSPADM

## 2025-04-24 RX ORDER — M-VIT,TX,IRON,MINS/CALC/FOLIC 27MG-0.4MG
1 TABLET ORAL DAILY
Status: DISCONTINUED | OUTPATIENT
Start: 2025-04-24 | End: 2025-04-30 | Stop reason: HOSPADM

## 2025-04-24 RX ORDER — SODIUM CHLORIDE 9 MG/ML
INJECTION, SOLUTION INTRAVENOUS PRN
Status: DISCONTINUED | OUTPATIENT
Start: 2025-04-24 | End: 2025-04-30 | Stop reason: HOSPADM

## 2025-04-24 RX ORDER — POTASSIUM CHLORIDE 7.45 MG/ML
10 INJECTION INTRAVENOUS PRN
Status: DISCONTINUED | OUTPATIENT
Start: 2025-04-24 | End: 2025-04-30 | Stop reason: HOSPADM

## 2025-04-24 RX ORDER — POLYETHYLENE GLYCOL 3350 17 G/17G
17 POWDER, FOR SOLUTION ORAL DAILY PRN
Status: DISCONTINUED | OUTPATIENT
Start: 2025-04-24 | End: 2025-04-27

## 2025-04-24 RX ORDER — MAGNESIUM SULFATE IN WATER 40 MG/ML
2000 INJECTION, SOLUTION INTRAVENOUS PRN
Status: DISCONTINUED | OUTPATIENT
Start: 2025-04-24 | End: 2025-04-30 | Stop reason: HOSPADM

## 2025-04-24 RX ORDER — MULTIVITAMIN WITH IRON
1 TABLET ORAL DAILY
COMMUNITY

## 2025-04-24 RX ORDER — SODIUM CHLORIDE 0.9 % (FLUSH) 0.9 %
5-40 SYRINGE (ML) INJECTION PRN
Status: DISCONTINUED | OUTPATIENT
Start: 2025-04-24 | End: 2025-04-30 | Stop reason: HOSPADM

## 2025-04-24 RX ORDER — POTASSIUM CHLORIDE 1500 MG/1
40 TABLET, EXTENDED RELEASE ORAL PRN
Status: DISCONTINUED | OUTPATIENT
Start: 2025-04-24 | End: 2025-04-30 | Stop reason: HOSPADM

## 2025-04-24 RX ORDER — ONDANSETRON 2 MG/ML
4 INJECTION INTRAMUSCULAR; INTRAVENOUS EVERY 6 HOURS PRN
Status: DISCONTINUED | OUTPATIENT
Start: 2025-04-24 | End: 2025-04-30 | Stop reason: HOSPADM

## 2025-04-24 RX ORDER — ONDANSETRON 4 MG/1
4 TABLET, ORALLY DISINTEGRATING ORAL EVERY 8 HOURS PRN
Status: DISCONTINUED | OUTPATIENT
Start: 2025-04-24 | End: 2025-04-30 | Stop reason: HOSPADM

## 2025-04-24 RX ORDER — FUROSEMIDE 10 MG/ML
40 INJECTION INTRAMUSCULAR; INTRAVENOUS 2 TIMES DAILY
Status: DISCONTINUED | OUTPATIENT
Start: 2025-04-24 | End: 2025-04-24

## 2025-04-24 RX ORDER — ACETAMINOPHEN 650 MG/1
650 SUPPOSITORY RECTAL EVERY 6 HOURS PRN
Status: DISCONTINUED | OUTPATIENT
Start: 2025-04-24 | End: 2025-04-30 | Stop reason: HOSPADM

## 2025-04-24 RX ORDER — MULTIVITAMIN WITH IRON
1 TABLET ORAL DAILY
Status: DISCONTINUED | OUTPATIENT
Start: 2025-04-24 | End: 2025-04-24 | Stop reason: RX

## 2025-04-24 RX ORDER — FUROSEMIDE 10 MG/ML
40 INJECTION INTRAMUSCULAR; INTRAVENOUS DAILY
Status: DISCONTINUED | OUTPATIENT
Start: 2025-04-25 | End: 2025-04-25

## 2025-04-24 RX ORDER — METOPROLOL SUCCINATE 50 MG/1
50 TABLET, EXTENDED RELEASE ORAL DAILY
Status: DISCONTINUED | OUTPATIENT
Start: 2025-04-24 | End: 2025-04-29

## 2025-04-24 RX ORDER — ACETAMINOPHEN 325 MG/1
650 TABLET ORAL EVERY 6 HOURS PRN
Status: DISCONTINUED | OUTPATIENT
Start: 2025-04-24 | End: 2025-04-30 | Stop reason: HOSPADM

## 2025-04-24 RX ORDER — SODIUM CHLORIDE 9 MG/ML
INJECTION, SOLUTION INTRAVENOUS CONTINUOUS
Status: DISCONTINUED | OUTPATIENT
Start: 2025-04-24 | End: 2025-04-24

## 2025-04-24 RX ADMIN — RIVAROXABAN 20 MG: 20 TABLET, FILM COATED ORAL at 17:56

## 2025-04-24 RX ADMIN — ACETAMINOPHEN 650 MG: 325 TABLET ORAL at 21:42

## 2025-04-24 RX ADMIN — DILTIAZEM HYDROCHLORIDE 30 MG: 30 TABLET, FILM COATED ORAL at 13:08

## 2025-04-24 RX ADMIN — ACETAMINOPHEN 650 MG: 325 TABLET ORAL at 13:08

## 2025-04-24 RX ADMIN — METOPROLOL SUCCINATE 50 MG: 50 TABLET, EXTENDED RELEASE ORAL at 13:07

## 2025-04-24 RX ADMIN — Medication 1 TABLET: at 13:08

## 2025-04-24 RX ADMIN — FUROSEMIDE 40 MG: 10 INJECTION, SOLUTION INTRAMUSCULAR; INTRAVENOUS at 13:07

## 2025-04-24 ASSESSMENT — PAIN - FUNCTIONAL ASSESSMENT
PAIN_FUNCTIONAL_ASSESSMENT: ACTIVITIES ARE NOT PREVENTED

## 2025-04-24 ASSESSMENT — PAIN DESCRIPTION - ORIENTATION
ORIENTATION: MID
ORIENTATION: MID

## 2025-04-24 ASSESSMENT — PAIN SCALES - GENERAL
PAINLEVEL_OUTOF10: 3
PAINLEVEL_OUTOF10: 2
PAINLEVEL_OUTOF10: 1
PAINLEVEL_OUTOF10: 1
PAINLEVEL_OUTOF10: 2
PAINLEVEL_OUTOF10: 4
PAINLEVEL_OUTOF10: 2

## 2025-04-24 ASSESSMENT — PAIN DESCRIPTION - DESCRIPTORS
DESCRIPTORS: DISCOMFORT
DESCRIPTORS: ACHING
DESCRIPTORS: ACHING

## 2025-04-24 ASSESSMENT — PAIN DESCRIPTION - LOCATION
LOCATION: BACK
LOCATION: HEAD
LOCATION: HEAD

## 2025-04-24 ASSESSMENT — PAIN DESCRIPTION - FREQUENCY: FREQUENCY: CONTINUOUS

## 2025-04-24 ASSESSMENT — PAIN SCALES - WONG BAKER: WONGBAKER_NUMERICALRESPONSE: NO HURT

## 2025-04-24 ASSESSMENT — PAIN DESCRIPTION - ONSET: ONSET: GRADUAL

## 2025-04-24 ASSESSMENT — PAIN DESCRIPTION - PAIN TYPE: TYPE: ACUTE PAIN

## 2025-04-24 NOTE — H&P
Hx     Hypertension Neg Hx            REVIEW OF SYSTEMS:     patients reported symptoms are in BOLD all other symptoms are negative.    CONSTITUTIONAL:      fatigue, fever, chills or night sweats, recent weight gain, recent wt loss, insomnia,  General weakness, poor appetite, muscle aches and pains    HEAD: headache, dizziness    EYES:      blurriness,  double vision, dryness,  discharge, irritation,diplopia    EARS:      hearing loss, vertigo, ear discharge,  Earache. Ringing in the ears.    NOSE:      Rhinorrhea, sneezing, epistaxis. Discharge, sinusitis,     MOUTH/THROAT:         sore throat, mouth ulcers, Hoarseness    RESPIRATORY:        Shortness of breath, wheezing,  cough, sputum, hemoptysis, obstructive sleep apnea,    CARDIOVASCULAR :      chest pain, palpitations, dyspnea on exercise, Lower extrimity edema (swelling),     GASTROINTESTINAL:       Dysphagia, Poor appetite,  Nausea, Vomiting, diarrhea, heartburn, abdominal pain. Blood in the stools, hematemesis. Pain with swallowing, constipation    GENITOURINARY:       Urinary frequency, hesitancy,  urgency, Dysuria, hematuria,  Urinary Incontinence. Urinary Retention.  GYNECOLOGICAL: vaginal bleeding , vaginal discharge, menopause    MUSCULOSKELETAL:       joint swelling or stiffness, joint pain, muscle pain, balance problems, low back pain.    NEUROLOGICAL:      Gait problems. Tremor. Dizziness. Pain and paresthesias, weakness in extremities. Seizures, memory loss    PSYCHLOGICAL:        Anxiety, depression    SKIN :      Rashes ulcers, skin color changes, easy bruisability, lymphadenopathy      Physical Exam:      Vitals: /69   Pulse 86   Temp 97.3 °F (36.3 °C) (Oral)   Resp 17   Ht 1.803 m (5' 11\")   Wt (!) 139.8 kg (308 lb 3.2 oz)   SpO2 97%   BMI 42.99 kg/m²     Gen:          Alert and oriented x 3  Eyes: PERRL. No sclera icterus. No conjunctival injection.   ENT: No discharge. Pharynx clear. External appearance of ears and nose

## 2025-04-24 NOTE — CONSULTS
[facility]   CONSULTATION  [phone]      Chief complaint: weakness        History of Present Illness:  Sonu King III is a 74 y.o. patient who presented to the hospital with complaints of body aches, hip pain, back pain. I have been asked to provide consultation regarding further management and testing. The patient reports that his wife was recently sick. He states that he has been very weak in his arms and legs. He denies any fever or chills. No chest pain or pressure. He came to the er and was found to be in atrial fibrillation with rapid ventricular response. He does not feel when he is in atrial fibrillation. Cardiology was consulted for further evaluation. He reports that he is feeling better today. No chest pain or pressure. Hips and muscles still hurt.       Past Medical History:   has a past medical history of Arthritis, Atrial fibrillation (HCC), Hiatal hernia, Hypertrophy of prostate without urinary obstruction and other lower urinary tract symptoms (LUTS), Kidney stone, Neoplasm of uncertain behavior of skin, Obesity, unspecified, and Organic sleep apnea, unspecified.    Surgical History:   has a past surgical history that includes hernia repair; Tonsillectomy; Colonoscopy (04/01/2010); Lithotripsy (Right, 09/2016); other surgical history (Right, 09/25/2016); other surgical history (10/12/2016); ablation of dysrhythmic focus; Pain management procedure (Bilateral, 06/16/2020); Pain management procedure (Right, 07/14/2020); and Skin cancer excision.     Social History:   reports that he quit smoking about 37 years ago. His smoking use included cigarettes. He started smoking about 67 years ago. He has a 45 pack-year smoking history. He has never used smokeless tobacco. He reports that he does not drink alcohol and does not use drugs.     Family History:  No family history of premature coronary artery disease, aortic disease, or valve disease.    Home Medications:  Were reviewed and are

## 2025-04-24 NOTE — PLAN OF CARE
[facility] Hospitalist Brief Transfer Accept Note -  [facility]         From [facility] PROGRESSIVE CARE   Case reviewed with ER physician Yuriy Kan MD     74 y.o. male with a past medical history of persistent atrial fibrillation on Xarelto, nonobstructive CAD, nephrolithiasis, hypertension, untreated SAM, BPH & class III obesity currently at [facility] emergency department on account of atrial fibrillation with RVR.  Currently on Cardizem.  Patient to be transferred to [facility] for further evaluation and treatment          Patient not seen by me, upon arrival please page hospitalist on-call.  We will assume care patient after arrival at [facility]        PCP: Bill Carpio MD Olaoye Jegede, MD   4/24/2025  2:36 AM

## 2025-04-24 NOTE — CARE COORDINATION
Case Management Assessment  Initial Evaluation    Date/Time of Evaluation: 4/24/2025 3:33 PM  Assessment Completed by: Aura Pryor RN    If patient is discharged prior to next notation, then this note serves as note for discharge by case management.    Patient Name: Sonu King III                   YOB: 1950  Diagnosis: Atrial fibrillation with RVR (HCC) [I48.91]  Atrial fibrillation with rapid ventricular response (HCC) [I48.91]                   Date / Time: 4/24/2025 10:07 AM    Patient Admission Status: Inpatient   Readmission Risk (Low < 19, Mod (19-27), High > 27): Readmission Risk Score: 8.7    Current PCP: Bill Carpio MD  PCP verified by CM? (P) Yes    Chart Reviewed: Yes      History Provided by: (P) Patient  Patient Orientation: (P) Alert and Oriented    Patient Cognition: (P) Alert    Hospitalization in the last 30 days (Readmission):  No    If yes, Readmission Assessment in CM Navigator will be completed.    Advance Directives:      Code Status: Full Code   Patient's Primary Decision Maker is: (P) Legal Next of Kin    Primary Decision Maker: Hillary King - Spouse - [phone]    Discharge Planning:    Patient lives with: (P) Spouse/Significant Other Type of Home: (P) House  Primary Care Giver: (P) Self  Patient Support Systems include: (P) Spouse/Significant Other, Family Members, Children   Current Financial resources: (P) Medicare  Current community resources: (P) None  Current services prior to admission: (P) None            Current DME:              Type of Home Care services:  (P) None    ADLS  Prior functional level: (P) Independent in ADLs/IADLs  Current functional level: (P) Independent in ADLs/IADLs    PT AM-PAC:   /24  OT AM-PAC:   /24    Family can provide assistance at DC: (P) Yes  Would you like Case Management to discuss the discharge plan with any other family members/significant others, and if so, who? (P) No  Plans to Return to Present Housing: (P)

## 2025-04-24 NOTE — FLOWSHEET NOTE
04/24/25 1946   Assessment   Charting Type Shift assessment   Psychosocial   Psychosocial (WDL) WDL   Chamois Coma Scale   Eye Opening 4   Best Verbal Response 5   Best Motor Response 6   Chamois Coma Scale Score 15   NIH Stroke Scale   Interval Baseline   Level of Consciousness (1a) 0   LOC Questions (1b) 0   LOC Commands (1c) 0   Best Gaze (2) 0   Visual (3) 0   Facial Palsy (4) 0   Motor Arm, Left (5a) 0   Motor Arm, Right (5b) 0   Motor Leg, Left (6a) 0   Motor Leg, Right (6b) 0   Limb Ataxia (7) 0   Sensory (8) 0   Best Language (9) 0   Dysarthria (10) 0   Extinction and Inattention (11) 0   Total 0   HEENT (Head, Ears, Eyes, Nose, & Throat)   HEENT (WDL) X   Right Eye Impaired vision;Glasses   Left Eye Impaired vision;Glasses   Right Ear Hearing aid   Left Ear Hearing aid   Respiratory   Respiratory (WDL) WDL   Cardiac   Cardiac Regularity Regularly Irregular   Heart Sounds S1, S2   Cardiac Rhythm Atrial fib   Cardiac (WDL) X   Cardiac Monitor   Telemetry Box Number [id number]   Alarm Audible Yes   Pacemaker   Pacemaker No   Gastrointestinal   Abdominal (WDL) WDL   Genitourinary   Genitourinary (WDL) WDL   Peripheral Vascular   Peripheral Vascular (WDL) WDL   Skin Integumentary    Skin Integumentary (WDL) WDL   Musculoskeletal   RL Extremity Weakness   LL Extremity Weakness   Musculoskeletal (WDL) X

## 2025-04-24 NOTE — CARE COORDINATION
Advance Care Planning     General Advance Care Planning (ACP) Conversation    Date of Conversation: 4/24/2025  Conducted with: Patient with Decision Making Capacity  Other persons present: Spouse Nicole Elizabeth    Healthcare Decision Maker:   Primary Decision Maker: ChristineHillary - Spouse - [phone]       Content/Action Overview:  Has NO ACP documents-Information provided  Reviewed DNR/DNI and patient elects Full Code (Attempt Resuscitation)        Length of Voluntary ACP Conversation in minutes:  <16 minutes (Non-Billable)    Aura Pryor RN

## 2025-04-25 ENCOUNTER — APPOINTMENT (OUTPATIENT)
Age: 75
DRG: 308 | End: 2025-04-25
Attending: INTERNAL MEDICINE
Payer: MEDICARE

## 2025-04-25 ENCOUNTER — APPOINTMENT (OUTPATIENT)
Age: 75
DRG: 308 | End: 2025-04-25
Attending: STUDENT IN AN ORGANIZED HEALTH CARE EDUCATION/TRAINING PROGRAM
Payer: MEDICARE

## 2025-04-25 PROBLEM — R53.1 WEAKNESS: Status: ACTIVE | Noted: 2025-04-25

## 2025-04-25 PROBLEM — I50.32 CHRONIC HEART FAILURE WITH PRESERVED EJECTION FRACTION (HCC): Status: ACTIVE | Noted: 2025-04-25

## 2025-04-25 LAB
ALBUMIN SERPL-MCNC: 3.8 G/DL (ref 3.4–5)
ALBUMIN/GLOB SERPL: 1.5 {RATIO}
ALP SERPL-CCNC: 62 U/L (ref 40–129)
ALT SERPL-CCNC: 61 U/L (ref 10–40)
ANION GAP SERPL CALCULATED.3IONS-SCNC: 14 MMOL/L (ref 3–16)
AST SERPL-CCNC: 68 U/L (ref 15–37)
B PARAP IS1001 DNA NPH QL NAA+NON-PROBE: NOT DETECTED
B PERT DNA SPEC QL NAA+PROBE: NOT DETECTED
BASOPHILS # BLD: 0.02 K/UL (ref 0–0.2)
BASOPHILS NFR BLD: 0 %
BILIRUB SERPL-MCNC: 1.1 MG/DL (ref 0–1)
BUN SERPL-MCNC: 23 MG/DL (ref 7–20)
C PNEUM DNA NPH QL NAA+NON-PROBE: NOT DETECTED
CALCIUM SERPL-MCNC: 8.2 MG/DL (ref 8.3–10.6)
CHLORIDE SERPL-SCNC: 101 MMOL/L (ref 99–110)
CO2 SERPL-SCNC: 25 MMOL/L (ref 21–32)
CREAT SERPL-MCNC: 1 MG/DL (ref 0.8–1.3)
ECHO BSA: 2.65 M2
ECHO EST RA PRESSURE: 8 MMHG
ECHO LA AREA 4C: 25.1 CM2
ECHO LA MAJOR AXIS: 5.7 CM
ECHO LA VOL MOD A4C: 90 ML (ref 18–58)
ECHO LA VOLUME INDEX MOD A4C: 36 ML/M2 (ref 16–34)
ECHO LV EDV A2C: 91 ML
ECHO LV EDV A4C: 112 ML
ECHO LV EDV INDEX A4C: 44 ML/M2
ECHO LV EDV NDEX A2C: 36 ML/M2
ECHO LV EF PHYSICIAN: 55 %
ECHO LV EJECTION FRACTION A2C: 59 %
ECHO LV EJECTION FRACTION A4C: 60 %
ECHO LV EJECTION FRACTION BIPLANE: 59 % (ref 55–100)
ECHO LV ESV A2C: 37 ML
ECHO LV ESV A4C: 45 ML
ECHO LV ESV INDEX A2C: 15 ML/M2
ECHO LV ESV INDEX A4C: 18 ML/M2
ECHO RA AREA 4C: 20.6 CM2
ECHO RA END SYSTOLIC VOLUME APICAL 4 CHAMBER INDEX BSA: 21 ML/M2
ECHO RA VOLUME: 54 ML
ECHO RIGHT VENTRICULAR SYSTOLIC PRESSURE (RVSP): 23 MMHG
ECHO RV FREE WALL PEAK S': 10.3 CM/S
ECHO RV TAPSE: 1.8 CM (ref 1.7–?)
ECHO TV REGURGITANT MAX VELOCITY: 1.94 M/S
ECHO TV REGURGITANT PEAK GRADIENT: 15 MMHG
EOSINOPHIL # BLD: 0.09 K/UL (ref 0–0.6)
EOSINOPHILS RELATIVE PERCENT: 1 %
ERYTHROCYTE [DISTWIDTH] IN BLOOD BY AUTOMATED COUNT: 14 % (ref 12.4–15.4)
FLUAV RNA NPH QL NAA+NON-PROBE: NOT DETECTED
FLUBV RNA NPH QL NAA+NON-PROBE: NOT DETECTED
GFR, ESTIMATED: 82 ML/MIN/1.73M2
GLUCOSE SERPL-MCNC: 101 MG/DL (ref 70–99)
HADV DNA NPH QL NAA+NON-PROBE: NOT DETECTED
HCOV 229E RNA NPH QL NAA+NON-PROBE: NOT DETECTED
HCOV HKU1 RNA NPH QL NAA+NON-PROBE: NOT DETECTED
HCOV NL63 RNA NPH QL NAA+NON-PROBE: NOT DETECTED
HCOV OC43 RNA NPH QL NAA+NON-PROBE: NOT DETECTED
HCT VFR BLD AUTO: 38.9 % (ref 40.5–52.5)
HGB BLD-MCNC: 13.3 G/DL (ref 13.5–17.5)
HMPV RNA NPH QL NAA+NON-PROBE: NOT DETECTED
HPIV1 RNA NPH QL NAA+NON-PROBE: NOT DETECTED
HPIV2 RNA NPH QL NAA+NON-PROBE: NOT DETECTED
HPIV3 RNA NPH QL NAA+NON-PROBE: NOT DETECTED
HPIV4 RNA NPH QL NAA+NON-PROBE: NOT DETECTED
IMM GRANULOCYTES # BLD AUTO: 0.01 K/UL (ref 0–0.5)
IMM GRANULOCYTES NFR BLD: 0 %
INR PPP: 1.9 (ref 0.9–1.2)
LYMPHOCYTES NFR BLD: 1.43 K/UL (ref 1–5.1)
LYMPHOCYTES RELATIVE PERCENT: 19 %
M PNEUMO DNA NPH QL NAA+NON-PROBE: NOT DETECTED
MAGNESIUM SERPL-MCNC: 2.2 MG/DL (ref 1.8–2.4)
MCH RBC QN AUTO: 31.7 PG (ref 26–34)
MCHC RBC AUTO-ENTMCNC: 34.2 G/DL (ref 31–36)
MCV RBC AUTO: 92.6 FL (ref 80–100)
MONOCYTES NFR BLD: 0.67 K/UL (ref 0–1.3)
MONOCYTES NFR BLD: 9 %
NEUTROPHILS NFR BLD: 70 %
NEUTS SEG NFR BLD: 5.26 K/UL (ref 1.7–7.7)
PLATELET # BLD AUTO: 114 K/UL (ref 135–450)
PMV BLD AUTO: 9 FL (ref 9.4–12.4)
POTASSIUM SERPL-SCNC: 3.8 MMOL/L (ref 3.5–5.1)
PROCALCITONIN SERPL-MCNC: 0.08 NG/ML (ref 0–0.15)
PROT SERPL-MCNC: 6.5 G/DL (ref 6.4–8.2)
PROTHROMBIN TIME: 21.9 SEC (ref 11.9–14.9)
PSA SERPL-MCNC: 1.4 NG/ML (ref 0–4)
RBC # BLD AUTO: 4.2 M/UL (ref 4.2–5.9)
RSV RNA NPH QL NAA+NON-PROBE: NOT DETECTED
RV+EV RNA NPH QL NAA+NON-PROBE: NOT DETECTED
SARS-COV-2 RNA NPH QL NAA+NON-PROBE: NOT DETECTED
SODIUM SERPL-SCNC: 140 MMOL/L (ref 136–145)
SPECIMEN DESCRIPTION: NORMAL
TROPONIN I SERPL HS-MCNC: 11 NG/L (ref 0–22)
WBC OTHER # BLD: 7.5 K/UL (ref 4–11)

## 2025-04-25 PROCEDURE — 80053 COMPREHEN METABOLIC PANEL: CPT

## 2025-04-25 PROCEDURE — 6370000000 HC RX 637 (ALT 250 FOR IP): Performed by: HOSPITALIST

## 2025-04-25 PROCEDURE — 0202U NFCT DS 22 TRGT SARS-COV-2: CPT

## 2025-04-25 PROCEDURE — 84484 ASSAY OF TROPONIN QUANT: CPT

## 2025-04-25 PROCEDURE — 6370000000 HC RX 637 (ALT 250 FOR IP): Performed by: STUDENT IN AN ORGANIZED HEALTH CARE EDUCATION/TRAINING PROGRAM

## 2025-04-25 PROCEDURE — 85025 COMPLETE CBC W/AUTO DIFF WBC: CPT

## 2025-04-25 PROCEDURE — 93325 DOPPLER ECHO COLOR FLOW MAPG: CPT | Performed by: INTERNAL MEDICINE

## 2025-04-25 PROCEDURE — 36415 COLL VENOUS BLD VENIPUNCTURE: CPT

## 2025-04-25 PROCEDURE — G0103 PSA SCREENING: HCPCS

## 2025-04-25 PROCEDURE — 93308 TTE F-UP OR LMTD: CPT | Performed by: INTERNAL MEDICINE

## 2025-04-25 PROCEDURE — 83735 ASSAY OF MAGNESIUM: CPT

## 2025-04-25 PROCEDURE — C8924 2D TTE W OR W/O FOL W/CON,FU: HCPCS

## 2025-04-25 PROCEDURE — 93321 DOPPLER ECHO F-UP/LMTD STD: CPT | Performed by: INTERNAL MEDICINE

## 2025-04-25 PROCEDURE — 76705 ECHO EXAM OF ABDOMEN: CPT

## 2025-04-25 PROCEDURE — 6360000002 HC RX W HCPCS: Performed by: HOSPITALIST

## 2025-04-25 PROCEDURE — 87040 BLOOD CULTURE FOR BACTERIA: CPT

## 2025-04-25 PROCEDURE — 85610 PROTHROMBIN TIME: CPT

## 2025-04-25 PROCEDURE — 1200000000 HC SEMI PRIVATE

## 2025-04-25 PROCEDURE — 2500000003 HC RX 250 WO HCPCS: Performed by: HOSPITALIST

## 2025-04-25 PROCEDURE — 6360000004 HC RX CONTRAST MEDICATION: Performed by: INTERNAL MEDICINE

## 2025-04-25 PROCEDURE — 84145 PROCALCITONIN (PCT): CPT

## 2025-04-25 PROCEDURE — 99223 1ST HOSP IP/OBS HIGH 75: CPT | Performed by: INTERNAL MEDICINE

## 2025-04-25 RX ORDER — SIMETHICONE 80 MG
80 TABLET,CHEWABLE ORAL EVERY 6 HOURS PRN
Status: DISCONTINUED | OUTPATIENT
Start: 2025-04-25 | End: 2025-04-30 | Stop reason: HOSPADM

## 2025-04-25 RX ORDER — FUROSEMIDE 10 MG/ML
40 INJECTION INTRAMUSCULAR; INTRAVENOUS 2 TIMES DAILY
Status: DISCONTINUED | OUTPATIENT
Start: 2025-04-25 | End: 2025-04-27

## 2025-04-25 RX ADMIN — SULFUR HEXAFLUORIDE 2 ML: 60.7; .19; .19 INJECTION, POWDER, LYOPHILIZED, FOR SUSPENSION INTRAVENOUS; INTRAVESICAL at 09:36

## 2025-04-25 RX ADMIN — SODIUM CHLORIDE, PRESERVATIVE FREE 10 ML: 5 INJECTION INTRAVENOUS at 20:01

## 2025-04-25 RX ADMIN — ACETAMINOPHEN 650 MG: 325 TABLET ORAL at 09:20

## 2025-04-25 RX ADMIN — SIMETHICONE 80 MG: 80 TABLET, CHEWABLE ORAL at 21:33

## 2025-04-25 RX ADMIN — FUROSEMIDE 40 MG: 10 INJECTION, SOLUTION INTRAMUSCULAR; INTRAVENOUS at 09:20

## 2025-04-25 RX ADMIN — METOPROLOL SUCCINATE 50 MG: 50 TABLET, EXTENDED RELEASE ORAL at 09:20

## 2025-04-25 RX ADMIN — SODIUM CHLORIDE, PRESERVATIVE FREE 10 ML: 5 INJECTION INTRAVENOUS at 09:35

## 2025-04-25 RX ADMIN — FUROSEMIDE 40 MG: 10 INJECTION, SOLUTION INTRAMUSCULAR; INTRAVENOUS at 16:17

## 2025-04-25 RX ADMIN — RIVAROXABAN 20 MG: 20 TABLET, FILM COATED ORAL at 09:20

## 2025-04-25 RX ADMIN — ACETAMINOPHEN 650 MG: 325 TABLET ORAL at 19:59

## 2025-04-25 RX ADMIN — Medication 1 TABLET: at 09:20

## 2025-04-25 ASSESSMENT — PAIN DESCRIPTION - PAIN TYPE
TYPE: CHRONIC PAIN
TYPE: CHRONIC PAIN
TYPE: ACUTE PAIN

## 2025-04-25 ASSESSMENT — PAIN DESCRIPTION - DESCRIPTORS
DESCRIPTORS: ACHING

## 2025-04-25 ASSESSMENT — PAIN SCALES - WONG BAKER: WONGBAKER_NUMERICALRESPONSE: NO HURT

## 2025-04-25 ASSESSMENT — PAIN DESCRIPTION - LOCATION
LOCATION: BACK;HEAD
LOCATION: BACK;HEAD
LOCATION: BACK

## 2025-04-25 ASSESSMENT — PAIN - FUNCTIONAL ASSESSMENT
PAIN_FUNCTIONAL_ASSESSMENT: ACTIVITIES ARE NOT PREVENTED

## 2025-04-25 ASSESSMENT — PAIN SCALES - GENERAL
PAINLEVEL_OUTOF10: 2
PAINLEVEL_OUTOF10: 4
PAINLEVEL_OUTOF10: 4
PAINLEVEL_OUTOF10: 3
PAINLEVEL_OUTOF10: 2

## 2025-04-25 ASSESSMENT — PAIN DESCRIPTION - ONSET: ONSET: GRADUAL

## 2025-04-25 ASSESSMENT — PAIN DESCRIPTION - FREQUENCY
FREQUENCY: INTERMITTENT
FREQUENCY: CONTINUOUS
FREQUENCY: INTERMITTENT

## 2025-04-25 ASSESSMENT — PAIN DESCRIPTION - ORIENTATION
ORIENTATION: LOWER
ORIENTATION: MID
ORIENTATION: LOWER

## 2025-04-26 LAB
ANION GAP SERPL CALCULATED.3IONS-SCNC: 12 MMOL/L (ref 3–16)
BUN SERPL-MCNC: 26 MG/DL (ref 7–20)
CALCIUM SERPL-MCNC: 8.6 MG/DL (ref 8.3–10.6)
CHLORIDE SERPL-SCNC: 98 MMOL/L (ref 99–110)
CO2 SERPL-SCNC: 30 MMOL/L (ref 21–32)
CREAT SERPL-MCNC: 1.1 MG/DL (ref 0.8–1.3)
ERYTHROCYTE [DISTWIDTH] IN BLOOD BY AUTOMATED COUNT: 13.7 % (ref 12.4–15.4)
GFR, ESTIMATED: 70 ML/MIN/1.73M2
GLUCOSE SERPL-MCNC: 105 MG/DL (ref 70–99)
HCT VFR BLD AUTO: 40.7 % (ref 40.5–52.5)
HGB BLD-MCNC: 14.4 G/DL (ref 13.5–17.5)
MCH RBC QN AUTO: 32 PG (ref 26–34)
MCHC RBC AUTO-ENTMCNC: 35.4 G/DL (ref 31–36)
MCV RBC AUTO: 90.4 FL (ref 80–100)
PLATELET # BLD AUTO: 131 K/UL (ref 135–450)
PMV BLD AUTO: 8.8 FL (ref 9.4–12.4)
POTASSIUM SERPL-SCNC: 3.2 MMOL/L (ref 3.5–5.1)
RBC # BLD AUTO: 4.5 M/UL (ref 4.2–5.9)
SODIUM SERPL-SCNC: 140 MMOL/L (ref 136–145)
WBC OTHER # BLD: 8.4 K/UL (ref 4–11)

## 2025-04-26 PROCEDURE — 97530 THERAPEUTIC ACTIVITIES: CPT

## 2025-04-26 PROCEDURE — 80048 BASIC METABOLIC PNL TOTAL CA: CPT

## 2025-04-26 PROCEDURE — 97535 SELF CARE MNGMENT TRAINING: CPT

## 2025-04-26 PROCEDURE — 85027 COMPLETE CBC AUTOMATED: CPT

## 2025-04-26 PROCEDURE — 97166 OT EVAL MOD COMPLEX 45 MIN: CPT

## 2025-04-26 PROCEDURE — 1200000000 HC SEMI PRIVATE

## 2025-04-26 PROCEDURE — 2500000003 HC RX 250 WO HCPCS: Performed by: HOSPITALIST

## 2025-04-26 PROCEDURE — 97162 PT EVAL MOD COMPLEX 30 MIN: CPT

## 2025-04-26 PROCEDURE — 36415 COLL VENOUS BLD VENIPUNCTURE: CPT

## 2025-04-26 PROCEDURE — 6370000000 HC RX 637 (ALT 250 FOR IP): Performed by: HOSPITALIST

## 2025-04-26 PROCEDURE — 6360000002 HC RX W HCPCS: Performed by: HOSPITALIST

## 2025-04-26 RX ADMIN — HYDROCODONE BITARTRATE AND ACETAMINOPHEN 1 TABLET: 5; 325 TABLET ORAL at 12:35

## 2025-04-26 RX ADMIN — SODIUM CHLORIDE, PRESERVATIVE FREE 10 ML: 5 INJECTION INTRAVENOUS at 09:23

## 2025-04-26 RX ADMIN — METOPROLOL SUCCINATE 50 MG: 50 TABLET, EXTENDED RELEASE ORAL at 09:23

## 2025-04-26 RX ADMIN — FUROSEMIDE 40 MG: 10 INJECTION, SOLUTION INTRAMUSCULAR; INTRAVENOUS at 09:23

## 2025-04-26 RX ADMIN — HYDROCODONE BITARTRATE AND ACETAMINOPHEN 1 TABLET: 5; 325 TABLET ORAL at 00:47

## 2025-04-26 RX ADMIN — FUROSEMIDE 40 MG: 10 INJECTION, SOLUTION INTRAMUSCULAR; INTRAVENOUS at 19:20

## 2025-04-26 RX ADMIN — SODIUM CHLORIDE, PRESERVATIVE FREE 10 ML: 5 INJECTION INTRAVENOUS at 21:03

## 2025-04-26 RX ADMIN — POTASSIUM CHLORIDE 40 MEQ: 1500 TABLET, EXTENDED RELEASE ORAL at 09:22

## 2025-04-26 RX ADMIN — Medication 1 TABLET: at 09:22

## 2025-04-26 RX ADMIN — RIVAROXABAN 20 MG: 20 TABLET, FILM COATED ORAL at 09:23

## 2025-04-26 ASSESSMENT — PAIN DESCRIPTION - ORIENTATION
ORIENTATION: RIGHT;LEFT
ORIENTATION: MID
ORIENTATION: LOWER
ORIENTATION: MID

## 2025-04-26 ASSESSMENT — PAIN DESCRIPTION - PAIN TYPE
TYPE: CHRONIC PAIN

## 2025-04-26 ASSESSMENT — PAIN DESCRIPTION - FREQUENCY
FREQUENCY: INTERMITTENT
FREQUENCY: INTERMITTENT

## 2025-04-26 ASSESSMENT — PAIN SCALES - GENERAL
PAINLEVEL_OUTOF10: 1
PAINLEVEL_OUTOF10: 3
PAINLEVEL_OUTOF10: 0
PAINLEVEL_OUTOF10: 6
PAINLEVEL_OUTOF10: 5
PAINLEVEL_OUTOF10: 0
PAINLEVEL_OUTOF10: 5

## 2025-04-26 ASSESSMENT — PAIN - FUNCTIONAL ASSESSMENT
PAIN_FUNCTIONAL_ASSESSMENT: ACTIVITIES ARE NOT PREVENTED

## 2025-04-26 ASSESSMENT — PAIN DESCRIPTION - DESCRIPTORS
DESCRIPTORS: ACHING

## 2025-04-26 ASSESSMENT — PAIN DESCRIPTION - LOCATION
LOCATION: BACK;HIP
LOCATION: HEAD;BACK
LOCATION: BACK
LOCATION: HEAD;BACK

## 2025-04-26 ASSESSMENT — PAIN DESCRIPTION - ONSET
ONSET: PROGRESSIVE
ONSET: PROGRESSIVE

## 2025-04-26 ASSESSMENT — PAIN SCALES - WONG BAKER: WONGBAKER_NUMERICALRESPONSE: NO HURT

## 2025-04-26 NOTE — FLOWSHEET NOTE
04/26/25 1936   External Urinary Catheter   Placement Date/Time: 04/25/25 0542     Site Assessment Clean,dry & intact   Placement Replaced   Securement Method Securing device (Describe)   Catheter Care Catheter/Wick replaced   Perineal Care Yes   Suction 40 mmgHg continuous   Urine Color Yellow   Urine Appearance Clear     Pericare provided. Purewick changed. Pt incont of cleae mucousy \"discharge\" from buttocks. Barrier cream applied to buttocks. Pt repositioned in bed. Fall precautions in place. Bed low, call bell in reach, instructed to call for assist.

## 2025-04-26 NOTE — CARE COORDINATION
Recommendations for SNF noted, list provided to patient and family for review. Insurance will require precert.    Aura Pryor RN

## 2025-04-26 NOTE — PLAN OF CARE
Problem: Occupational Therapy - Adult  Goal: By Discharge: Performs self-care activities at highest level of function for planned discharge setting.  See evaluation for individualized goals.  Outcome: Progressing

## 2025-04-27 ENCOUNTER — APPOINTMENT (OUTPATIENT)
Age: 75
DRG: 308 | End: 2025-04-27
Attending: STUDENT IN AN ORGANIZED HEALTH CARE EDUCATION/TRAINING PROGRAM
Payer: MEDICARE

## 2025-04-27 LAB
ANION GAP SERPL CALCULATED.3IONS-SCNC: 13 MMOL/L (ref 3–16)
BUN SERPL-MCNC: 29 MG/DL (ref 7–20)
CALCIUM SERPL-MCNC: 8.9 MG/DL (ref 8.3–10.6)
CHLORIDE SERPL-SCNC: 96 MMOL/L (ref 99–110)
CO2 SERPL-SCNC: 31 MMOL/L (ref 21–32)
CREAT SERPL-MCNC: 1.1 MG/DL (ref 0.8–1.3)
ERYTHROCYTE [DISTWIDTH] IN BLOOD BY AUTOMATED COUNT: 14 % (ref 12.4–15.4)
GFR, ESTIMATED: 70 ML/MIN/1.73M2
GLUCOSE SERPL-MCNC: 126 MG/DL (ref 70–99)
HCT VFR BLD AUTO: 44 % (ref 40.5–52.5)
HGB BLD-MCNC: 15.6 G/DL (ref 13.5–17.5)
MCH RBC QN AUTO: 31.3 PG (ref 26–34)
MCHC RBC AUTO-ENTMCNC: 35.5 G/DL (ref 31–36)
MCV RBC AUTO: 88.2 FL (ref 80–100)
PLATELET # BLD AUTO: 153 K/UL (ref 135–450)
PLATELET CONFIRMATION: NORMAL
PMV BLD AUTO: 8.8 FL
POTASSIUM SERPL-SCNC: 3.3 MMOL/L (ref 3.5–5.1)
RBC # BLD AUTO: 4.99 M/UL (ref 4.2–5.9)
SODIUM SERPL-SCNC: 139 MMOL/L (ref 136–145)
WBC OTHER # BLD: 8.5 K/UL (ref 4–11)

## 2025-04-27 PROCEDURE — 2500000003 HC RX 250 WO HCPCS: Performed by: HOSPITALIST

## 2025-04-27 PROCEDURE — 1200000000 HC SEMI PRIVATE

## 2025-04-27 PROCEDURE — 85027 COMPLETE CBC AUTOMATED: CPT

## 2025-04-27 PROCEDURE — 6370000000 HC RX 637 (ALT 250 FOR IP): Performed by: INTERNAL MEDICINE

## 2025-04-27 PROCEDURE — 6370000000 HC RX 637 (ALT 250 FOR IP): Performed by: HOSPITALIST

## 2025-04-27 PROCEDURE — 36415 COLL VENOUS BLD VENIPUNCTURE: CPT

## 2025-04-27 PROCEDURE — 6360000002 HC RX W HCPCS: Performed by: HOSPITALIST

## 2025-04-27 PROCEDURE — 74018 RADEX ABDOMEN 1 VIEW: CPT

## 2025-04-27 PROCEDURE — 80048 BASIC METABOLIC PNL TOTAL CA: CPT

## 2025-04-27 RX ORDER — TORSEMIDE 20 MG/1
10 TABLET ORAL DAILY
Status: DISCONTINUED | OUTPATIENT
Start: 2025-04-28 | End: 2025-04-30 | Stop reason: HOSPADM

## 2025-04-27 RX ORDER — BISACODYL 10 MG
10 SUPPOSITORY, RECTAL RECTAL ONCE
Status: COMPLETED | OUTPATIENT
Start: 2025-04-27 | End: 2025-04-27

## 2025-04-27 RX ORDER — POLYETHYLENE GLYCOL 3350 17 G/17G
17 POWDER, FOR SOLUTION ORAL DAILY
Status: DISCONTINUED | OUTPATIENT
Start: 2025-04-27 | End: 2025-04-30 | Stop reason: HOSPADM

## 2025-04-27 RX ORDER — SENNOSIDES A AND B 8.6 MG/1
1 TABLET, FILM COATED ORAL NIGHTLY
Status: DISCONTINUED | OUTPATIENT
Start: 2025-04-27 | End: 2025-04-30 | Stop reason: HOSPADM

## 2025-04-27 RX ORDER — POTASSIUM CHLORIDE 1500 MG/1
40 TABLET, EXTENDED RELEASE ORAL 2 TIMES DAILY
Status: COMPLETED | OUTPATIENT
Start: 2025-04-27 | End: 2025-04-29

## 2025-04-27 RX ADMIN — POTASSIUM CHLORIDE 40 MEQ: 1500 TABLET, EXTENDED RELEASE ORAL at 10:31

## 2025-04-27 RX ADMIN — RIVAROXABAN 20 MG: 20 TABLET, FILM COATED ORAL at 10:31

## 2025-04-27 RX ADMIN — HYDROCODONE BITARTRATE AND ACETAMINOPHEN 1 TABLET: 5; 325 TABLET ORAL at 17:58

## 2025-04-27 RX ADMIN — SODIUM CHLORIDE, PRESERVATIVE FREE 10 ML: 5 INJECTION INTRAVENOUS at 10:32

## 2025-04-27 RX ADMIN — SODIUM CHLORIDE, PRESERVATIVE FREE 10 ML: 5 INJECTION INTRAVENOUS at 22:08

## 2025-04-27 RX ADMIN — POTASSIUM CHLORIDE 40 MEQ: 1500 TABLET, EXTENDED RELEASE ORAL at 22:08

## 2025-04-27 RX ADMIN — SENNOSIDES 8.6 MG: 8.6 TABLET, FILM COATED ORAL at 22:08

## 2025-04-27 RX ADMIN — BISACODYL 10 MG: 10 SUPPOSITORY RECTAL at 21:14

## 2025-04-27 RX ADMIN — FUROSEMIDE 40 MG: 10 INJECTION, SOLUTION INTRAMUSCULAR; INTRAVENOUS at 10:31

## 2025-04-27 RX ADMIN — METOPROLOL SUCCINATE 50 MG: 50 TABLET, EXTENDED RELEASE ORAL at 10:31

## 2025-04-27 RX ADMIN — POLYETHYLENE GLYCOL 3350 17 G: 17 POWDER, FOR SOLUTION ORAL at 12:50

## 2025-04-27 RX ADMIN — Medication 1 TABLET: at 10:31

## 2025-04-27 ASSESSMENT — PAIN - FUNCTIONAL ASSESSMENT: PAIN_FUNCTIONAL_ASSESSMENT: PREVENTS OR INTERFERES SOME ACTIVE ACTIVITIES AND ADLS

## 2025-04-27 ASSESSMENT — PAIN SCALES - GENERAL: PAINLEVEL_OUTOF10: 6

## 2025-04-27 ASSESSMENT — PAIN DESCRIPTION - DESCRIPTORS: DESCRIPTORS: ACHING

## 2025-04-27 ASSESSMENT — PAIN DESCRIPTION - LOCATION: LOCATION: BACK

## 2025-04-27 ASSESSMENT — PAIN DESCRIPTION - ORIENTATION: ORIENTATION: LEFT

## 2025-04-27 NOTE — PLAN OF CARE
Problem: Discharge Planning  Goal: Discharge to home or other facility with appropriate resources  Outcome: Progressing     Problem: Safety - Adult  Goal: Free from fall injury  Outcome: Progressing     Problem: ABCDS Injury Assessment  Goal: Absence of physical injury  Outcome: Progressing     Problem: Pain  Goal: Verbalizes/displays adequate comfort level or baseline comfort level  Outcome: Progressing     Problem: Occupational Therapy - Adult  Goal: By Discharge: Performs self-care activities at highest level of function for planned discharge setting.  See evaluation for individualized goals.  4/26/2025 1246 by Christopher Mon, OT  Outcome: Progressing     Problem: Physical Therapy - Adult  Goal: By Discharge: Performs mobility at highest level of function for planned discharge setting.  See evaluation for individualized goals.  4/26/2025 1252 by Jazmine Baeza, PT  Outcome: Progressing

## 2025-04-28 LAB
ANION GAP SERPL CALCULATED.3IONS-SCNC: 13 MMOL/L (ref 3–16)
BUN SERPL-MCNC: 33 MG/DL (ref 7–20)
CALCIUM SERPL-MCNC: 8.9 MG/DL (ref 8.3–10.6)
CHLORIDE SERPL-SCNC: 96 MMOL/L (ref 99–110)
CO2 SERPL-SCNC: 29 MMOL/L (ref 21–32)
CREAT SERPL-MCNC: 1 MG/DL (ref 0.8–1.3)
ERYTHROCYTE [DISTWIDTH] IN BLOOD BY AUTOMATED COUNT: 13.8 % (ref 12.4–15.4)
GFR, ESTIMATED: 76 ML/MIN/1.73M2
GLUCOSE SERPL-MCNC: 120 MG/DL (ref 70–99)
HCT VFR BLD AUTO: 45.1 % (ref 40.5–52.5)
HGB BLD-MCNC: 15.8 G/DL (ref 13.5–17.5)
MCH RBC QN AUTO: 31.2 PG (ref 26–34)
MCHC RBC AUTO-ENTMCNC: 35 G/DL (ref 31–36)
MCV RBC AUTO: 89 FL (ref 80–100)
PLATELET # BLD AUTO: 154 K/UL (ref 135–450)
PMV BLD AUTO: 8.5 FL (ref 9.4–12.4)
POTASSIUM SERPL-SCNC: 3.8 MMOL/L (ref 3.5–5.1)
RBC # BLD AUTO: 5.07 M/UL (ref 4.2–5.9)
SODIUM SERPL-SCNC: 137 MMOL/L (ref 136–145)
WBC OTHER # BLD: 8.6 K/UL (ref 4–11)

## 2025-04-28 PROCEDURE — 80048 BASIC METABOLIC PNL TOTAL CA: CPT

## 2025-04-28 PROCEDURE — 36415 COLL VENOUS BLD VENIPUNCTURE: CPT

## 2025-04-28 PROCEDURE — 2500000003 HC RX 250 WO HCPCS: Performed by: HOSPITALIST

## 2025-04-28 PROCEDURE — 6370000000 HC RX 637 (ALT 250 FOR IP): Performed by: HOSPITALIST

## 2025-04-28 PROCEDURE — 1200000000 HC SEMI PRIVATE

## 2025-04-28 PROCEDURE — 6370000000 HC RX 637 (ALT 250 FOR IP): Performed by: INTERNAL MEDICINE

## 2025-04-28 PROCEDURE — 85027 COMPLETE CBC AUTOMATED: CPT

## 2025-04-28 RX ADMIN — SODIUM CHLORIDE, PRESERVATIVE FREE 10 ML: 5 INJECTION INTRAVENOUS at 08:26

## 2025-04-28 RX ADMIN — HYDROCODONE BITARTRATE AND ACETAMINOPHEN 1 TABLET: 5; 325 TABLET ORAL at 22:26

## 2025-04-28 RX ADMIN — RIVAROXABAN 20 MG: 20 TABLET, FILM COATED ORAL at 08:25

## 2025-04-28 RX ADMIN — POTASSIUM CHLORIDE 40 MEQ: 1500 TABLET, EXTENDED RELEASE ORAL at 20:59

## 2025-04-28 RX ADMIN — METOPROLOL SUCCINATE 50 MG: 50 TABLET, EXTENDED RELEASE ORAL at 08:25

## 2025-04-28 RX ADMIN — Medication 1 TABLET: at 08:26

## 2025-04-28 RX ADMIN — MINERAL OIL: 999 LIQUID ORAL at 12:37

## 2025-04-28 RX ADMIN — SENNOSIDES 8.6 MG: 8.6 TABLET, FILM COATED ORAL at 21:00

## 2025-04-28 RX ADMIN — TORSEMIDE 10 MG: 20 TABLET ORAL at 08:25

## 2025-04-28 RX ADMIN — SODIUM CHLORIDE, PRESERVATIVE FREE 10 ML: 5 INJECTION INTRAVENOUS at 21:00

## 2025-04-28 RX ADMIN — POLYETHYLENE GLYCOL 3350 17 G: 17 POWDER, FOR SOLUTION ORAL at 08:26

## 2025-04-28 RX ADMIN — POTASSIUM CHLORIDE 40 MEQ: 1500 TABLET, EXTENDED RELEASE ORAL at 08:25

## 2025-04-28 RX ADMIN — POLYETHYLENE GLYCOL-3350 AND ELECTROLYTES 1000 ML: 236; 6.74; 5.86; 2.97; 22.74 POWDER, FOR SOLUTION ORAL at 12:37

## 2025-04-28 ASSESSMENT — PAIN - FUNCTIONAL ASSESSMENT: PAIN_FUNCTIONAL_ASSESSMENT: PREVENTS OR INTERFERES WITH ALL ACTIVE AND SOME PASSIVE ACTIVITIES

## 2025-04-28 ASSESSMENT — PAIN DESCRIPTION - DESCRIPTORS: DESCRIPTORS: ACHING;DISCOMFORT

## 2025-04-28 ASSESSMENT — PAIN DESCRIPTION - ORIENTATION: ORIENTATION: RIGHT;LEFT

## 2025-04-28 ASSESSMENT — PAIN DESCRIPTION - LOCATION: LOCATION: BACK;HIP

## 2025-04-28 ASSESSMENT — PAIN SCALES - GENERAL
PAINLEVEL_OUTOF10: 6
PAINLEVEL_OUTOF10: 1

## 2025-04-28 NOTE — CARE COORDINATION
Spoke with patient at bedside, [facility] has chosen [facility].  -Referrals called to both facilities.    Aura Pryor RN      Addendum 1535: Call to [facility] to check on status of referral-MD will review in AM to make decision and start precert if able to accept.     [facility] is OON with insurance.    Aura Pryor RN

## 2025-04-29 LAB
MAGNESIUM SERPL-MCNC: 2.3 MG/DL (ref 1.8–2.4)
MICROORGANISM SPEC CULT: NORMAL
MICROORGANISM SPEC CULT: NORMAL
SERVICE CMNT-IMP: NORMAL
SERVICE CMNT-IMP: NORMAL
SPECIMEN DESCRIPTION: NORMAL
SPECIMEN DESCRIPTION: NORMAL

## 2025-04-29 PROCEDURE — 97110 THERAPEUTIC EXERCISES: CPT

## 2025-04-29 PROCEDURE — 83735 ASSAY OF MAGNESIUM: CPT

## 2025-04-29 PROCEDURE — 36415 COLL VENOUS BLD VENIPUNCTURE: CPT

## 2025-04-29 PROCEDURE — 6370000000 HC RX 637 (ALT 250 FOR IP): Performed by: HOSPITALIST

## 2025-04-29 PROCEDURE — 97530 THERAPEUTIC ACTIVITIES: CPT

## 2025-04-29 PROCEDURE — 2500000003 HC RX 250 WO HCPCS: Performed by: HOSPITALIST

## 2025-04-29 PROCEDURE — 6370000000 HC RX 637 (ALT 250 FOR IP): Performed by: INTERNAL MEDICINE

## 2025-04-29 PROCEDURE — 1200000000 HC SEMI PRIVATE

## 2025-04-29 RX ORDER — METOPROLOL SUCCINATE 25 MG/1
25 TABLET, EXTENDED RELEASE ORAL ONCE
Status: COMPLETED | OUTPATIENT
Start: 2025-04-29 | End: 2025-04-29

## 2025-04-29 RX ORDER — METOPROLOL SUCCINATE 100 MG/1
100 TABLET, EXTENDED RELEASE ORAL DAILY
Status: DISCONTINUED | OUTPATIENT
Start: 2025-04-30 | End: 2025-04-30 | Stop reason: HOSPADM

## 2025-04-29 RX ORDER — METOPROLOL SUCCINATE 25 MG/1
25 TABLET, EXTENDED RELEASE ORAL DAILY
Status: DISCONTINUED | OUTPATIENT
Start: 2025-04-29 | End: 2025-04-29 | Stop reason: SDUPTHER

## 2025-04-29 RX ORDER — POLYETHYLENE GLYCOL 3350 17 G/17G
17 POWDER, FOR SOLUTION ORAL DAILY
Qty: 527 G | Refills: 0
Start: 2025-04-30 | End: 2025-05-30

## 2025-04-29 RX ADMIN — METOPROLOL SUCCINATE 50 MG: 50 TABLET, EXTENDED RELEASE ORAL at 08:38

## 2025-04-29 RX ADMIN — SODIUM CHLORIDE, PRESERVATIVE FREE 10 ML: 5 INJECTION INTRAVENOUS at 08:38

## 2025-04-29 RX ADMIN — Medication 1 TABLET: at 08:37

## 2025-04-29 RX ADMIN — SENNOSIDES 8.6 MG: 8.6 TABLET, FILM COATED ORAL at 20:03

## 2025-04-29 RX ADMIN — METOPROLOL SUCCINATE 25 MG: 25 TABLET, EXTENDED RELEASE ORAL at 16:37

## 2025-04-29 RX ADMIN — POLYETHYLENE GLYCOL 3350 17 G: 17 POWDER, FOR SOLUTION ORAL at 08:37

## 2025-04-29 RX ADMIN — TORSEMIDE 10 MG: 20 TABLET ORAL at 08:38

## 2025-04-29 RX ADMIN — RIVAROXABAN 20 MG: 20 TABLET, FILM COATED ORAL at 08:38

## 2025-04-29 RX ADMIN — HYDROCODONE BITARTRATE AND ACETAMINOPHEN 1 TABLET: 5; 325 TABLET ORAL at 12:02

## 2025-04-29 RX ADMIN — HYDROCODONE BITARTRATE AND ACETAMINOPHEN 1 TABLET: 5; 325 TABLET ORAL at 22:24

## 2025-04-29 RX ADMIN — SODIUM CHLORIDE, PRESERVATIVE FREE 10 ML: 5 INJECTION INTRAVENOUS at 20:04

## 2025-04-29 RX ADMIN — POTASSIUM CHLORIDE 40 MEQ: 1500 TABLET, EXTENDED RELEASE ORAL at 08:38

## 2025-04-29 ASSESSMENT — PAIN DESCRIPTION - ORIENTATION
ORIENTATION: OTHER (COMMENT)
ORIENTATION: LOWER

## 2025-04-29 ASSESSMENT — PAIN SCALES - GENERAL
PAINLEVEL_OUTOF10: 0
PAINLEVEL_OUTOF10: 0
PAINLEVEL_OUTOF10: 7
PAINLEVEL_OUTOF10: 0
PAINLEVEL_OUTOF10: 6

## 2025-04-29 ASSESSMENT — PAIN DESCRIPTION - PAIN TYPE: TYPE: ACUTE PAIN

## 2025-04-29 ASSESSMENT — PAIN DESCRIPTION - DESCRIPTORS
DESCRIPTORS: ACHING
DESCRIPTORS: SORE;SPASM;ACHING

## 2025-04-29 ASSESSMENT — PAIN DESCRIPTION - LOCATION
LOCATION: GENERALIZED
LOCATION: BACK

## 2025-04-29 ASSESSMENT — PAIN DESCRIPTION - FREQUENCY: FREQUENCY: CONTINUOUS

## 2025-04-29 ASSESSMENT — PAIN SCALES - WONG BAKER: WONGBAKER_NUMERICALRESPONSE: NO HURT

## 2025-04-29 ASSESSMENT — PAIN - FUNCTIONAL ASSESSMENT: PAIN_FUNCTIONAL_ASSESSMENT: PREVENTS OR INTERFERES SOME ACTIVE ACTIVITIES AND ADLS

## 2025-04-29 ASSESSMENT — PAIN DESCRIPTION - ONSET: ONSET: ON-GOING

## 2025-04-29 NOTE — PLAN OF CARE
Problem: Discharge Planning  Goal: Discharge to home or other facility with appropriate resources  4/29/2025 1056 by Ania Torres RN  Outcome: Progressing  Flowsheets (Taken 4/29/2025 1056)  Discharge to home or other facility with appropriate resources: Identify barriers to discharge with patient and caregiver  4/28/2025 2324 by Cheryl Galvez RN  Outcome: Progressing     Problem: Safety - Adult  Goal: Free from fall injury  4/29/2025 1056 by Ania Torres RN  Outcome: Progressing  Flowsheets (Taken 4/29/2025 1056)  Free From Fall Injury: Instruct family/caregiver on patient safety  4/28/2025 2324 by Cheryl Galvez RN  Outcome: Progressing     Problem: ABCDS Injury Assessment  Goal: Absence of physical injury  4/29/2025 1056 by Ania Torres RN  Outcome: Progressing  Flowsheets (Taken 4/29/2025 1056)  Absence of Physical Injury: Implement safety measures based on patient assessment  4/28/2025 2324 by Cheryl Galvez RN  Outcome: Progressing     Problem: Pain  Goal: Verbalizes/displays adequate comfort level or baseline comfort level  4/29/2025 1056 by Ania Torres RN  Outcome: Progressing  Flowsheets (Taken 4/29/2025 1056)  Verbalizes/displays adequate comfort level or baseline comfort level:   Encourage patient to monitor pain and request assistance   Administer analgesics based on type and severity of pain and evaluate response   Implement non-pharmacological measures as appropriate and evaluate response   Assess pain using appropriate pain scale  4/28/2025 2324 by Cheryl Galvez RN  Outcome: Progressing

## 2025-04-29 NOTE — CARE COORDINATION
Call to [facility], they are able to accept and have stated precert.    Aura Pryor RN    Addendum 1458: Call from [facility], insurance is requesting updated PT/OT notes. Message sent to PT/OT.    Aura Pryor RN

## 2025-04-30 VITALS
DIASTOLIC BLOOD PRESSURE: 83 MMHG | WEIGHT: 298.72 LBS | RESPIRATION RATE: 18 BRPM | SYSTOLIC BLOOD PRESSURE: 123 MMHG | OXYGEN SATURATION: 96 % | HEART RATE: 91 BPM | HEIGHT: 71 IN | TEMPERATURE: 97.5 F | BODY MASS INDEX: 41.82 KG/M2

## 2025-04-30 LAB
ANION GAP SERPL CALCULATED.3IONS-SCNC: 11 MMOL/L (ref 3–16)
BUN SERPL-MCNC: 30 MG/DL (ref 7–20)
CALCIUM SERPL-MCNC: 8.9 MG/DL (ref 8.3–10.6)
CHLORIDE SERPL-SCNC: 100 MMOL/L (ref 99–110)
CO2 SERPL-SCNC: 27 MMOL/L (ref 21–32)
CREAT SERPL-MCNC: 1 MG/DL (ref 0.8–1.3)
GFR, ESTIMATED: 77 ML/MIN/1.73M2
GLUCOSE SERPL-MCNC: 115 MG/DL (ref 70–99)
POTASSIUM SERPL-SCNC: 4.6 MMOL/L (ref 3.5–5.1)
SODIUM SERPL-SCNC: 138 MMOL/L (ref 136–145)

## 2025-04-30 PROCEDURE — 2500000003 HC RX 250 WO HCPCS: Performed by: HOSPITALIST

## 2025-04-30 PROCEDURE — 6370000000 HC RX 637 (ALT 250 FOR IP): Performed by: INTERNAL MEDICINE

## 2025-04-30 PROCEDURE — 36415 COLL VENOUS BLD VENIPUNCTURE: CPT

## 2025-04-30 PROCEDURE — 80048 BASIC METABOLIC PNL TOTAL CA: CPT

## 2025-04-30 PROCEDURE — 6370000000 HC RX 637 (ALT 250 FOR IP): Performed by: HOSPITALIST

## 2025-04-30 RX ORDER — METOPROLOL SUCCINATE 100 MG/1
100 TABLET, EXTENDED RELEASE ORAL DAILY
Qty: 30 TABLET | Refills: 3
Start: 2025-04-30

## 2025-04-30 RX ORDER — HYDROCODONE BITARTRATE AND ACETAMINOPHEN 5; 325 MG/1; MG/1
1 TABLET ORAL EVERY 6 HOURS PRN
Qty: 20 TABLET | Refills: 0 | Status: SHIPPED | OUTPATIENT
Start: 2025-04-30 | End: 2025-05-05

## 2025-04-30 RX ADMIN — RIVAROXABAN 20 MG: 20 TABLET, FILM COATED ORAL at 08:38

## 2025-04-30 RX ADMIN — METOPROLOL SUCCINATE 100 MG: 100 TABLET, EXTENDED RELEASE ORAL at 08:38

## 2025-04-30 RX ADMIN — POLYETHYLENE GLYCOL 3350 17 G: 17 POWDER, FOR SOLUTION ORAL at 08:38

## 2025-04-30 RX ADMIN — SODIUM CHLORIDE, PRESERVATIVE FREE 10 ML: 5 INJECTION INTRAVENOUS at 08:38

## 2025-04-30 RX ADMIN — TORSEMIDE 10 MG: 20 TABLET ORAL at 08:38

## 2025-04-30 RX ADMIN — Medication 1 TABLET: at 08:38

## 2025-04-30 RX ADMIN — HYDROCODONE BITARTRATE AND ACETAMINOPHEN 1 TABLET: 5; 325 TABLET ORAL at 15:28

## 2025-04-30 ASSESSMENT — PAIN DESCRIPTION - ONSET: ONSET: ON-GOING

## 2025-04-30 ASSESSMENT — PAIN DESCRIPTION - LOCATION: LOCATION: BACK

## 2025-04-30 ASSESSMENT — PAIN DESCRIPTION - DESCRIPTORS: DESCRIPTORS: ACHING;DISCOMFORT

## 2025-04-30 ASSESSMENT — PAIN DESCRIPTION - PAIN TYPE: TYPE: CHRONIC PAIN

## 2025-04-30 ASSESSMENT — PAIN - FUNCTIONAL ASSESSMENT: PAIN_FUNCTIONAL_ASSESSMENT: ACTIVITIES ARE NOT PREVENTED

## 2025-04-30 ASSESSMENT — PAIN DESCRIPTION - FREQUENCY: FREQUENCY: CONTINUOUS

## 2025-04-30 ASSESSMENT — PAIN SCALES - GENERAL: PAINLEVEL_OUTOF10: 5

## 2025-04-30 ASSESSMENT — PAIN DESCRIPTION - ORIENTATION: ORIENTATION: LOWER

## 2025-04-30 NOTE — PLAN OF CARE
Problem: Discharge Planning  Goal: Discharge to home or other facility with appropriate resources  4/30/2025 1556 by Ania Torres RN  Outcome: Adequate for Discharge  4/30/2025 0948 by Ania Torres RN  Outcome: Progressing  Flowsheets (Taken 4/30/2025 0948)  Discharge to home or other facility with appropriate resources:   Identify barriers to discharge with patient and caregiver   Arrange for needed discharge resources and transportation as appropriate     Problem: Safety - Adult  Goal: Free from fall injury  4/30/2025 1556 by Ania Torres RN  Outcome: Adequate for Discharge  4/30/2025 0948 by Ania Torres RN  Outcome: Progressing  Flowsheets (Taken 4/30/2025 0948)  Free From Fall Injury: Instruct family/caregiver on patient safety     Problem: ABCDS Injury Assessment  Goal: Absence of physical injury  4/30/2025 1556 by Ania Torres RN  Outcome: Adequate for Discharge  4/30/2025 0948 by Ania Torres RN  Outcome: Progressing  Flowsheets (Taken 4/30/2025 0948)  Absence of Physical Injury: Implement safety measures based on patient assessment     Problem: Pain  Goal: Verbalizes/displays adequate comfort level or baseline comfort level  4/30/2025 1556 by Ania Torres RN  Outcome: Adequate for Discharge  4/30/2025 0948 by Ania Torres RN  Outcome: Progressing  Flowsheets (Taken 4/30/2025 0948)  Verbalizes/displays adequate comfort level or baseline comfort level:   Encourage patient to monitor pain and request assistance   Administer analgesics based on type and severity of pain and evaluate response   Implement non-pharmacological measures as appropriate and evaluate response   Assess pain using appropriate pain scale

## 2025-04-30 NOTE — DISCHARGE INSTR - COC
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.816    Lumbar radiculopathy M54.16    Atrial fibrillation with rapid ventricular response ([facility]) I48.91    Atrial fibrillation with RVR ([facility]) I48.91    Weakness R53.1    Chronic heart failure with preserved ejection fraction ([facility]) I50.32       Isolation/Infection:   Isolation            No Isolation          Patient Infection Status    None to display              Nurse Assessment:  Last Vital Signs: /83   Pulse 91   Temp 97.5 °F (36.4 °C) (Oral)   Resp 18   Ht 1.803 m (5' 11\")   Wt 135.5 kg (298 lb 11.6 oz)   SpO2 96%   BMI 41.66 kg/m²     Last documented pain score (0-10 scale): Pain Level: 7  Last Weight:   Wt Readings from Last 1 Encounters:   04/28/25 135.5 kg (298 lb 11.6 oz)     Mental Status:  oriented and alert    IV Access:  - None    Nursing Mobility/ADLs:  Walking   Assisted  Transfer  Assisted  Bathing  Assisted  Dressing  Assisted  Toileting  Assisted  Feeding  Independent  Med Admin  Assisted  Med Delivery   whole    Wound Care Documentation and Therapy:        Elimination:  Continence:   Bowel: Yes  Bladder: Yes  Urinary Catheter: None   Colostomy/Ileostomy/Ileal Conduit: No       Date of Last BM: 4/28/25    Intake/Output Summary (Last 24 hours) at 4/30/2025 1339  Last data filed at 4/29/2025 2355  Gross per 24 hour   Intake 480 ml   Output 1100 ml   Net -620 ml     I/O last 3 completed shifts:  In: 1320 [P.O.:1320]  Out: 1100 [Urine:1100]    Safety Concerns:     At Risk for Falls    Impairments/Disabilities:      Hearing    Nutrition Therapy:  Current Nutrition Therapy:   - Oral Diet:  Low Sodium (2gm)    Routes of Feeding: Oral  Liquids: Thin Liquids  Daily Fluid Restriction: no  Last Modified Barium Swallow with

## 2025-04-30 NOTE — PROGRESS NOTES
[facility] Progress Note      Name:  Sonu King III /Age/Sex: 1950  (74 y.o. male)   MRN & CSN:  [id number] & [id number] Encounter Date/Time: 2025 4:25 PM EDT   Location:  [facility]/3205-01 PCP: Bill Carpio MD     Attending:Yesi Menchaca MD       Hospital Day: 4          Impression   Sonu King III is a 74 y.o. male with known history of atrial fibrillation presented with generalized weakness.  He was found to be in A-fib with RVR.  He was admitted for rate control and further evaluation of generalized weakness.    Assessment and Plan   Assessment:  # Atrial fibrillation with RVR, now controlled rate.  # Acute on chronic diastolic congestive heart failure  # Generalized weakness.  # Abdominal distention, constipation.  # Long-term anticoagulation on Xarelto  # Hypokalemia    Plan:  # Stop IV Lasix and resume home torsemide in AM.  # Continue Toprol-XL for rate control  # Check KUB, CT abdomen from  with no evidence obstruction.  # Bowel regimen, MiraLAX, Dulcolax suppository, Senokot  # PT/OT  # SNF placement  # Replete potassium      DVT Prophylaxis: On Xarelto  Code Status: Full Code        Disposition   Expected Disposition: SNF  Estimated Date of Discharge: Medically stable  Patient requires continued admission due to needing placement      Subjective:   Patient was seen and examined.  He reports not having a good bowel movement for over a week.  He is only passing some mucus.  Objective:     Intake/Output Summary (Last 24 hours) at 2025 1625  Last data filed at 2025 0403  Gross per 24 hour   Intake 260 ml   Output 1100 ml   Net -840 ml      Vitals:   Vitals:    25 0615 25 0827 25 1213 25 1608   BP:  129/75 (!) 129/98 (!) 130/97   Pulse:  (!) 105 (!) 105 (!) 110   Resp:  18 18 18   Temp:  98.4 °F (36.9 °C) 98.2 °F (36.8 °C) 97.9 °F (36.6 °C)   TempSrc:  Oral Oral Oral   SpO2:  95% 98% 94%   Weight: 135.4 kg (298 lb 9.6 oz)      Height:     
        [facility] [place]      Name:  Sonu King III /Age/Sex: 1950  (74 y.o. male)   MRN & CSN:  [id number] & [id number] Encounter Date/Time: 2025 4:25 PM EDT   Location:  [facility] [place]/3205-01 PCP: Bill Carpio MD     Attending:Yesi Menchaca MD       Hospital Day: 5          Impression   Sonu King III is a 74 y.o. male with known history of atrial fibrillation presented with generalized weakness.  He was found to be in A-fib with RVR.  He was admitted for rate control and further evaluation of generalized weakness.    Assessment and Plan   Assessment:  # Atrial fibrillation with RVR, now controlled rate.  # Acute on chronic diastolic congestive heart failure  # Generalized weakness.  # Abdominal distention, constipation.  # Long-term anticoagulation on Xarelto  # Hypokalemia, replaced.    Plan:  # No proper bowel movement yet, patient is only passing mucus.  Not clear what that could be.  Laxatives ordered including GoLytely and smog enema.  Patient still did not pass proper brown stool.  His CT abdomen on admission and KUB from yesterday both reassuring.  Continue to monitor but if he does not progress to proper bowel movement, may consider GI consult.  # Continue home torsemide.  # Continue Toprol-XL for rate control  # SNF placement      DVT Prophylaxis: On Xarelto  Code Status: Full Code        Disposition   Expected Disposition: SNF  Estimated Date of Discharge: Medically stable  Patient requires continued admission due to needing placement      Subjective:   Patient was seen and examined.  He continues to report abdominal distention and discomfort.  He did not pass any brown stool.  He passed only some mucus.  Objective:     Intake/Output Summary (Last 24 hours) at 2025 1533  Last data filed at 2025 0424  Gross per 24 hour   Intake 310 ml   Output 1000 ml   Net -690 ml      Vitals:   Vitals:    25 0424 25 0734 25 1145 25 1515   BP: 127/79 (!) 146/99 
    Hospitalist Progress Note  4/25/2025 9:03 AM    PCP: Bill Carpio MD    [phone]     Date of Admission: 4/24/2025                                                                                                                     HOSPITAL COURSE    Patient demographics:  The patient  Sonu King III is a 74 y.o. male     Significant past medical history:   Patient Active Problem List   Diagnosis    Kidney stone    Obesity    Organic sleep apnea    Back pain    Morbid obesity with BMI of 40.0-44.9, adult (HCC)    Renal colic on right side    Paroxysmal atrial fibrillation (HCC)    Cellulitis and abscess of buttock    Chest pain    Sacroiliitis, not elsewhere classified    Spondylosis 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 or radiculopathy    Lumbar radiculopathy    Atrial fibrillation with rapid ventricular response (HCC)    Atrial fibrillation with RVR (HCC)         Presenting symptoms:      Diagnostic workup:      CONSULTS DURING ADMISSION :   IP CONSULT TO CARDIOLOGY      Patient was diagnosed with:        Treatment while inpatient:  The patient Sonu King III is a 74 y.o.male with medical history significant for atrial fibrillation hiatal hernia obstructive sleep apnea and benign prostatic hypertrophy.                                                                                         ----------------------------------------------------------      SUBJECTIVE COMPLAINTS-     Diet: Diet NPO      OBJECTIVE:   Patient Active Problem List   Diagnosis    Kidney stone    Obesity    Organic sleep apnea    Back pain    Morbid obesity with BMI of 40.0-44.9, adult (HCC)    Renal colic on right side    Paroxysmal atrial fibrillation (HCC)    Cellulitis and abscess of buttock    Chest pain    Sacroiliitis, not elsewhere classified    Spondylosis 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 or radiculopathy    Lumbar radiculopathy    Atrial fibrillation with rapid ventricular response (HCC)    Atrial fibrillation with 
   04/28/25 1544   Encounter Summary   Encounter Overview/Reason Attempted Encounter   Service Provided For Patient not available   Referral/Consult From Rounding   Last Encounter  04/28/25  (Patient was sleeping/CK)       
  [facility] - Rehabilitation Department       Occupational Therapy  3205/3205-01  [facility] 3 PROGRESSIVE CARE    [] Initial Evaluation            [x] Daily Treatment Note         [] Discharge Summary      Patient: Sonu King III   : 1950   MRN: [id number]   Date of Service:  2025  Discharge Recommendations: SNF     AM-PAC Inpatient Daily Activity Raw Score: 16    DME Required For Discharge: DME to be determined at next level of care    Therapy discharge recommendations take into account each patient's current medical complexities and are subject to input/oversight from the patient's healthcare team.     Barriers to Home Discharge:   [] Steps to access home entry or bed/bath:   [x] Unable to transfer, ambulate, or propel wheelchair household distances without assist   [x] Limited available assist at home upon discharge    [] Patient or family requests d/c to post-acute facility    [] Poor cognition/safety awareness for d/c to home alone    []Unable to maintain ordered weight bearing status    [] Patient with salient signs of long-standing immobility   [x] Patient is at risk for falls due to: decrease activity tolerance   [x] Other: decrease ADL & functional mobility status    If pt is unable to be seen after this session, please let this note serve as discharge summary.  Please see case management note for discharge disposition.  Thank you.  Admitting Diagnosis:  Atrial fibrillation with rapid ventricular response (HCC)  Referring Physician: Dr. Alvarado   Current Admission Summary: 74 y.o. male with a past medical history of persistent atrial fibrillation on Xarelto, nonobstructive CAD, nephrolithiasis, hypertension, untreated SAM, BPH & class III obesity currently at [facility] emergency department on account of atrial fibrillation with RVR.  Currently on Cardizem.  Patient to be transferred to [facility] for further evaluation and treatment     Past Medical History:  has a past medical history of 
  [facility] - Rehabilitation Department      Physical Therapy  3205/3205-01  [facility] 3 PROGRESSIVE CARE    [x] Initial Evaluation            [x] Daily Treatment Note         [] Discharge Summary      Patient: Sonu King III   : 1950   MRN: [id number]   Date of Service:  2025   Discharge Recommendations: SNF  [facility] Raw Score:   AM-PAC Inpatient Mobility Raw Score : 12            DME Required For Discharge:   DME to be determined at next level of care  Therapy discharge recommendations take into account each patient's current medical complexities and are subject to input/oversight from the patient's healthcare team.   Barriers to Home Discharge:   [] Steps to access home entry or bed/bath:   [x] Unable to transfer, ambulate, or propel wheelchair household distances without assist   [x] Limited available assist at home upon discharge    [] Patient or family requests d/c to post-acute facility    [] Poor cognition/safety awareness for d/c to home alone    []Unable to maintain ordered weight bearing status    [] Patient with salient signs of long-standing immobility   [] Patient is at risk for falls due to:   [] Other:  If pt is unable to be seen after this session, please let this note serve as discharge summary.  Please see case management note for discharge disposition.  Thank you.  Admitting Diagnosis: Atrial fibrillation with rapid ventricular response (HCC)  Ordering Physician: Dr. Alvarado  Current Admission Summary:   HISTORY OF PRESENT ILLNESS:   The patient Sonu King III is a 74 y.o.male with medical history significant for atrial fibrillation hiatal hernia obstructive sleep apnea and benign prostatic hypertrophy.  For atrial fibrillation patient is status post 2 cardioversions and ablation and patient is on chronic anticoagulation.  Patient reports that for the last few days he is feeling generalized weakness to the point where he is unable to come out of the bed.  In the emergency 
  [facility] - Rehabilitation Department       Occupational Therapy  3205/3205-01  [facility] 3 PROGRESSIVE CARE    [x] Initial Evaluation            [x] Daily Treatment Note         [] Discharge Summary      Patient: Sonu King III   : 1950   MRN: [id number]   Date of Service:  2025  Discharge Recommendations: SNF          DME Required For Discharge: DME to be determined at next level of care    Therapy discharge recommendations take into account each patient's current medical complexities and are subject to input/oversight from the patient's healthcare team.     Barriers to Home Discharge:   [] Steps to access home entry or bed/bath:   [x] Unable to transfer, ambulate, or propel wheelchair household distances without assist   [x] Limited available assist at home upon discharge    [] Patient or family requests d/c to post-acute facility    [] Poor cognition/safety awareness for d/c to home alone    []Unable to maintain ordered weight bearing status    [] Patient with salient signs of long-standing immobility   [] Patient is at risk for falls due to:   [] Other:    If pt is unable to be seen after this session, please let this note serve as discharge summary.  Please see case management note for discharge disposition.  Thank you.  Admitting Diagnosis:  Atrial fibrillation with rapid ventricular response (HCC)  Referring Physician: Dr. Alvarado   Current Admission Summary: 74 y.o. male with a past medical history of persistent atrial fibrillation on Xarelto, nonobstructive CAD, nephrolithiasis, hypertension, untreated SAM, BPH & class III obesity currently at [facility] emergency department on account of atrial fibrillation with RVR.  Currently on Cardizem.  Patient to be transferred to [facility] for further evaluation and treatment     Past Medical History:  has a past medical history of Arthritis, Atrial fibrillation (HCC), Hiatal hernia, Hypertrophy of prostate without urinary obstruction and other 
  Physician Progress Note      PATIENT:               JENIFER LOPEZ  CSN #:                  [id number]  :                       1950  ADMIT DATE:       2025 10:07 AM  DISCH DATE:  RESPONDING  PROVIDER #:        Pancho Alvarado MD          QUERY TEXT:    The attending physician is required to clarify conflicting documentation in   the medical record.  Noted documentation of Hefpef -stable\"-MD Frank on   25 and \"Acute on chronic diastolic congestive heart failure\"-MD Alvarado on   .    The clinical indicators include:   H/P \"dyspnea on exercise, Lower extremity edema (swelling)  -MD notes \"Abdominal distention\"  -ProBNP=1,142  Meds: IV Lasix, Metoprolol, strict I/O, daily weights  Options provided:  -- Acute on chronic diastolic congestive heart failure confirmed  -- Chronic HFpEF confirmed after study  -- Other - I will add my own diagnosis  -- Disagree - Not applicable / Not valid  -- Disagree - Clinically unable to determine / Unknown  -- Refer to Clinical Documentation Reviewer    PROVIDER RESPONSE TEXT:    Acute on chronic diastolic congestive heart failure confirmed    Query created by: Oumou Graham on 2025 1:27 PM      Electronically signed by:  Pancho Alvarado MD 2025 3:00 PM          
  Physician Progress Note      PATIENT:               JENIFER LOPEZ  CSN #:                  [id number]  :                       1950  ADMIT DATE:       2025 10:07 AM  DISCH DATE:  RESPONDING  PROVIDER #:        Pancho Alvarado MD          QUERY TEXT:    Diastolic CHF is documented in the H/P 25. Please document acuity:    The clinical indicators include:  H/P \"Diastolic CHF\"; ECHO  \" Left?Ventricle: Normal left ventricular   systolic function with a visually estimated EF of 55 - 60%. \"  -Lasix, Metoprolol  -I/O, daily weight  Options provided:  -- Chronic Diastolic CHF/HFpEF  -- Other - I will add my own diagnosis  -- Disagree - Not applicable / Not valid  -- Disagree - Clinically unable to determine / Unknown  -- Refer to Clinical Documentation Reviewer    PROVIDER RESPONSE TEXT:    This patient has chronic diastolic CHF/HFpEF.    Query created by: Oumou Graham on 2025 12:29 PM      Electronically signed by:  Pancho Alvarado MD 2025 1:53 PM          
4 Eyes Skin Assessment     NAME:  Sonu King III  YOB: 1950  MEDICAL RECORD NUMBER:  [id number]    The patient is being assessed for  Admission    I agree that at least one RN has performed a thorough Head to Toe Skin Assessment on the patient. ALL assessment sites listed below have been assessed.      Areas assessed by both nurses:    Head, Face, Ears, Shoulders, Back, Chest, Arms, Elbows, Hands, Sacrum. Buttock, Coccyx, Ischium, and Legs. Feet and Heels        Does the Patient have a Wound? No noted wound(s)       Artemio Prevention initiated by RN: No  Wound Care Orders initiated by RN: No    Pressure Injury (Stage 3,4, Unstageable, DTI, NWPT, and Complex wounds) if present, place Wound referral order by RN under : No    New Ostomies, if present place, Ostomy referral order under : No     Nurse 1 eSignature: Electronically signed by Do Conrad RN on 4/24/25 at 12:29 PM EDT    **SHARE this note so that the co-signing nurse can place an eSignature**    Nurse 2 eSignature: Electronically signed by Zuleima Looney RN on 4/24/25 at 3:25 PM EDT   
Patient educated on discharge instructions as well as new medications use, dosage, administration and possible side effects.  Patient verified knowledge. IV removed without difficulty and dry dressing in place. Telemetry monitor removed and returned to CMU. Pt left facility in stable condition on stretcher to Skilled nursing facility with all of their personal belongings.       Report called at this time to SNF, report given to nurse assuming care of patient, answered all questions.  
Pt having jelly-like clear discharge from rectum with gas; md notified.   
Pt taken to toilet in br with steady. Pt has a large amount of gas/abdomen distention/belching. Pt attempted to have BM but only clear ,mucous coming out. MD notified. Pt assisted back to bed, bed alarm in place. Family at bedside.   
Pt up to chair with walker/assistx1. Chair alarm in place. Call bell in reach.  
Tele reading afib 140s when pt gets out of bed/ exerts himself. HR goes back down to low 100s with rest, pt asymptomatic.  
balance of 3.4 L.  Patient will be discharged on his home dose of torsemide                                                           ----------------------------------------------------------      SUBJECTIVE COMPLAINTS-     Diet: ADULT DIET; Regular; Low Sodium (2 gm)      OBJECTIVE:   Patient Active Problem List   Diagnosis    Kidney stone    Obesity    Organic sleep apnea    Back pain    Morbid obesity with BMI of 40.0-44.9, adult ([facility])    Renal colic on right side    PAF (paroxysmal atrial fibrillation) ([facility])    Cellulitis and abscess of buttock    Chest pain    Sacroiliitis, not elsewhere classified    Spondylosis 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 or radiculopathy    Lumbar radiculopathy    Atrial fibrillation with rapid ventricular response ([facility])    Atrial fibrillation with RVR ([facility])    Weakness    Chronic heart failure with preserved ejection fraction ([facility])       Allergies  Bee venom and Adhesive tape    Medications    Scheduled Meds:   polyethylene glycol  17 g Oral Daily    potassium chloride  40 mEq Oral BID    torsemide  10 mg Oral Daily    senna  1 tablet Oral Nightly    metoprolol succinate  50 mg Oral Daily    rivaroxaban  20 mg Oral Daily with breakfast    sodium chloride flush  5-40 mL IntraVENous 2 times per day    multivitamin  1 tablet Oral Daily     Continuous Infusions:   sodium chloride       PRN Meds:  simethicone, HYDROcodone-acetaminophen, sodium chloride flush, sodium chloride, potassium chloride **OR** potassium alternative oral replacement **OR** potassium chloride, magnesium sulfate, ondansetron **OR** ondansetron, acetaminophen **OR** acetaminophen    Vitals   Vitals /wt Patient Vitals for the past 8 hrs:   BP Temp Temp src Pulse Resp SpO2   04/29/25 0700 131/80 98.6 °F (37 °C) Oral 57 17 98 %        72HR INTAKE/OUTPUT:    Intake/Output Summary (Last 24 hours) at 4/29/2025 0810  Last data filed at 4/29/2025 0400  Gross per 24 hour   Intake 600 ml   Output --   Net 600 ml 
radiculopathy    Lumbar radiculopathy    Atrial fibrillation with rapid ventricular response (HCC)    Atrial fibrillation with RVR (HCC)    Weakness    Chronic heart failure with preserved ejection fraction (HCC)       Allergies  Bee venom and Adhesive tape    Medications    Scheduled Meds:   furosemide  40 mg IntraVENous BID    metoprolol succinate  50 mg Oral Daily    rivaroxaban  20 mg Oral Daily with breakfast    sodium chloride flush  5-40 mL IntraVENous 2 times per day    multivitamin  1 tablet Oral Daily     Continuous Infusions:   sodium chloride       PRN Meds:  simethicone, HYDROcodone-acetaminophen, sodium chloride flush, sodium chloride, potassium chloride **OR** potassium alternative oral replacement **OR** potassium chloride, magnesium sulfate, ondansetron **OR** ondansetron, polyethylene glycol, acetaminophen **OR** acetaminophen    Vitals   Vitals /wt Patient Vitals for the past 8 hrs:   BP Temp Temp src Pulse Resp SpO2   04/26/25 0741 (!) 129/94 97.3 °F (36.3 °C) Oral 100 18 96 %   04/26/25 0438 (!) 140/81 97.5 °F (36.4 °C) Oral (!) 107 18 94 %        72HR INTAKE/OUTPUT:    Intake/Output Summary (Last 24 hours) at 4/26/2025 1124  Last data filed at 4/26/2025 0653  Gross per 24 hour   Intake 600 ml   Output 1400 ml   Net -800 ml       Exam:    Gen:   Alert and oriented ×3    Eyes: PERRL. No sclera icterus. No conjunctival injection.   ENT: No discharge. Pharynx clear. External appearance of ears and nose normal.  Neck: Trachea midline. No obvious mass.    Resp: No accessory muscle use. No crackles. No wheezes. No rhonchi.  CV: Regular rate. Regular rhythm. No murmur or rub. No edema.   GI: Non-tender. Non-distended. No hernia.   Lymph: No cervical LAD. No supraclavicular LAD.   M/S: / Ext. No cyanosis. No clubbing. No joint deformity.    Neuro: CN 2-12 are intact,  no neurologic deficits noted.    PT/INR:   Recent Labs     04/23/25  1744 04/25/25  0647   PROTIME 12.5* 21.9*   INR 1.14* 1.9*     APTT: 
distances due to tachycardia with severe back pain and report of feeling of weakness.   Gait Mechanics: TBD  Comments:   verbal cues for safety  Stair Mobility:  Stair mobility not completed on this date.  Comments:     Wheelchair Mobility:  No w/c mobility completed on this date.  Comments:       Balance  SITTING BALANCE  [] Patient requires use of bed rail for static/dynamic sitting   [] Patient requires assist for static/dynamic sitting  with assist  up to  [x] Patient able to complete in room static and dynamic sitting balance activity independently without assistive device.   Comments:   STANDING BALANCE  [] Patient requires use of assistive device  for static/dynamic standing   [x] Patient requires assist for static/dynamic standing with assist  up to Min assist   [] Patient able to complete in room static and dynamic standing balance activity independently without assistive device.   Comments:     Other Therapeutic Interventions  SUPINE THEREX:  []10-15reps of each exercise:  [x]Hip abduction sidelying x10   [x]Gluteal isometrics  [x]Quadricep Isometrics  [x]Heelslides  []SAQ  []SLR  [x]Ankle pumps     Disease Specific Education for:    [x]Taking breaks for back pain       Patient instructed in and performed the following exercises with:  [x]Verbal cues for exercise angles and set up  []Cues for balance  []Facilitation for technique  [x]Patient verbalized understanding of or demonstrated understanding of instruction      Functional Outcomes  AM-PAC Inpatient Mobility Raw Score : 14              Education   Barriers To Learning: none  Patient Education: patient educated on plan of care, discharge recommendations, goals, PT role and benefits, functional mobility training, transfer training  Learning Assessment:  patient verbalizes understanding, would benefit from continued reinforcement    Patient Disposition at end of session:  patient left in chair, chair alarm in place, call light within reach, patient at

## 2025-04-30 NOTE — CARE COORDINATION
Important Message from Medicare letter given to  Sonu King III  by Michael Rivera. All questions answered,  Sonu King III  voiced understanding.  Sonu King III given a copy of the IMM.

## 2025-04-30 NOTE — PLAN OF CARE
Problem: Discharge Planning  Goal: Discharge to home or other facility with appropriate resources  4/29/2025 2045 by Shae Chase RN  Outcome: Progressing  4/29/2025 1056 by Ania Torres RN  Outcome: Progressing  Flowsheets (Taken 4/29/2025 1056)  Discharge to home or other facility with appropriate resources: Identify barriers to discharge with patient and caregiver     Problem: Safety - Adult  Goal: Free from fall injury  4/29/2025 2045 by Shae Chase RN  Outcome: Progressing  4/29/2025 1056 by Ania Torres RN  Outcome: Progressing  Flowsheets (Taken 4/29/2025 1056)  Free From Fall Injury: Instruct family/caregiver on patient safety     Problem: ABCDS Injury Assessment  Goal: Absence of physical injury  4/29/2025 2045 by Shae Chase RN  Outcome: Progressing  4/29/2025 1056 by Ania Torres RN  Outcome: Progressing  Flowsheets (Taken 4/29/2025 1056)  Absence of Physical Injury: Implement safety measures based on patient assessment     Problem: Pain  Goal: Verbalizes/displays adequate comfort level or baseline comfort level  4/29/2025 1056 by Ania Torres RN  Outcome: Progressing  Flowsheets (Taken 4/29/2025 1056)  Verbalizes/displays adequate comfort level or baseline comfort level:   Encourage patient to monitor pain and request assistance   Administer analgesics based on type and severity of pain and evaluate response   Implement non-pharmacological measures as appropriate and evaluate response   Assess pain using appropriate pain scale

## 2025-04-30 NOTE — CARE COORDINATION
CASE MANAGEMENT DISCHARGE SUMMARY      Discharge to: [facility]    Precertification completed: yes    IMM given: 4/30/25  Follow-Up copy of Important Message from Medicare (IMM2) has been explained to patient and/or designated healthcare decision maker (HCDM). Pt and/or HCDM aware that patient is permitted to stay an additional 4 hours prior to discharge to consider an appeal if they feel as though they are being discharged too soon. Patient may discharge as planned if chooses to do so.  Patient/HCDM voice no other concerns or questions regarding this process.     Transportation:    Medical Transport explained to pt/family. Pt/family voice no agency preference.       Ambulance form completed: Yes    Confirmed discharge plan with:     Patient: yes     Family:  yes, messages left for wife and son     Facility/Agency, name:  [facility]/SNF 3rd floor   Phone number for report to facility: [phone]     RN, name: Ania    Note: Discharging nurse to complete ANJELICA, reconcile AVS, and place final copy with patient's discharge packet. RN to ensure that written prescriptions for  Level II medications are sent with patient to the facility as per protocol.

## 2025-04-30 NOTE — PLAN OF CARE
Problem: Discharge Planning  Goal: Discharge to home or other facility with appropriate resources  4/30/2025 0948 by Ania Torres RN  Outcome: Progressing  Flowsheets (Taken 4/30/2025 0948)  Discharge to home or other facility with appropriate resources:   Identify barriers to discharge with patient and caregiver   Arrange for needed discharge resources and transportation as appropriate  4/29/2025 2045 by Shae Chase RN  Outcome: Progressing     Problem: Safety - Adult  Goal: Free from fall injury  4/30/2025 0948 by Ania Torres RN  Outcome: Progressing  Flowsheets (Taken 4/30/2025 0948)  Free From Fall Injury: Instruct family/caregiver on patient safety  4/29/2025 2045 by Shae Chase RN  Outcome: Progressing     Problem: ABCDS Injury Assessment  Goal: Absence of physical injury  4/30/2025 0948 by Ania Torres RN  Outcome: Progressing  Flowsheets (Taken 4/30/2025 0948)  Absence of Physical Injury: Implement safety measures based on patient assessment  4/29/2025 2045 by Shae Chase RN  Outcome: Progressing     Problem: Pain  Goal: Verbalizes/displays adequate comfort level or baseline comfort level  Outcome: Progressing  Flowsheets (Taken 4/30/2025 0948)  Verbalizes/displays adequate comfort level or baseline comfort level:   Encourage patient to monitor pain and request assistance   Administer analgesics based on type and severity of pain and evaluate response   Implement non-pharmacological measures as appropriate and evaluate response   Assess pain using appropriate pain scale

## 2025-05-01 NOTE — DISCHARGE SUMMARY
plan.      Signed:  VALERIE MILNER MD   5/1/2025      Thank you Bill Carpio MD for the opportunity to be involved in this patient's care. If you have any questions or concerns please feel free to contact me at [phone].    This note was transcribed using Dragon Dictation software. Please disregard any translational errors.

## 2025-05-28 ENCOUNTER — OFFICE VISIT (OUTPATIENT)
Dept: FAMILY MEDICINE CLINIC | Age: 75
End: 2025-05-28
Payer: MEDICARE

## 2025-05-28 ENCOUNTER — TELEPHONE (OUTPATIENT)
Dept: CARDIOLOGY CLINIC | Age: 75
End: 2025-05-28

## 2025-05-28 VITALS
BODY MASS INDEX: 43.12 KG/M2 | WEIGHT: 308 LBS | SYSTOLIC BLOOD PRESSURE: 122 MMHG | DIASTOLIC BLOOD PRESSURE: 80 MMHG | HEIGHT: 71 IN | OXYGEN SATURATION: 97 % | HEART RATE: 86 BPM

## 2025-05-28 DIAGNOSIS — R53.83 OTHER FATIGUE: ICD-10-CM

## 2025-05-28 DIAGNOSIS — G47.33 OSA (OBSTRUCTIVE SLEEP APNEA): ICD-10-CM

## 2025-05-28 DIAGNOSIS — I48.0 PAROXYSMAL ATRIAL FIBRILLATION (HCC): Primary | ICD-10-CM

## 2025-05-28 DIAGNOSIS — R06.09 DYSPNEA ON EXERTION: ICD-10-CM

## 2025-05-28 DIAGNOSIS — I50.32 CHRONIC HEART FAILURE WITH PRESERVED EJECTION FRACTION (HCC): ICD-10-CM

## 2025-05-28 PROCEDURE — 1159F MED LIST DOCD IN RCRD: CPT | Performed by: FAMILY MEDICINE

## 2025-05-28 PROCEDURE — G2211 COMPLEX E/M VISIT ADD ON: HCPCS | Performed by: FAMILY MEDICINE

## 2025-05-28 PROCEDURE — 99214 OFFICE O/P EST MOD 30 MIN: CPT | Performed by: FAMILY MEDICINE

## 2025-05-28 PROCEDURE — 36415 COLL VENOUS BLD VENIPUNCTURE: CPT | Performed by: FAMILY MEDICINE

## 2025-05-28 PROCEDURE — 1123F ACP DISCUSS/DSCN MKR DOCD: CPT | Performed by: FAMILY MEDICINE

## 2025-05-28 RX ORDER — METHOCARBAMOL 750 MG/1
TABLET, FILM COATED ORAL
COMMUNITY
Start: 2025-05-07 | End: 2025-08-13

## 2025-05-28 ASSESSMENT — ENCOUNTER SYMPTOMS: SHORTNESS OF BREATH: 1

## 2025-05-28 NOTE — TELEPHONE ENCOUNTER
Melissa from Dr. Carpio's office @ [facility] called on behalf of pt.  Pt was admitted to [facility] for Afib on 04/24/25.  PCP would like to know if the pt should be seen before 09/02 appt w/ ALDO.  Pt can be contacted or contact Melissa @ [phone].

## 2025-05-28 NOTE — PROGRESS NOTES
Chief Complaint   Patient presents with    Follow-Up from Hospital     Patient was in [facility] 25-5/15/25 for chronic A. Fib.       HPI:  Sonu King III is a 74 y.o. (: 1950) here today   for follow up from hospital.  Patient was in [facility] 25-5/15/25 for chronic A. Fib.  HPI  Initially did not have enough strength to get out of chair.  Was having sig pain to back and hips.  Presented w/ generalized weakness and sob.  Called squad, taken to [facility], then transferred to [place].  Initially given cardizem to help w/ HR.  Thought to be acute on chronic diastolic chf.  Was given iv lasix and metoprolol.  Torsemide at d/c.  Metoprolol dose inc at d/c.  Seen by cardio during hospitalization. Has cardio appt w/ EP nurse practitioner in September.  EF normal on echo.  After d/c from [place], transferred to [facility] for rehab.  There x 2 week.  Focus on PT, strength.  No sig current palpitations.     Had f/u w/ pain mgmt approx 9 days ago.  Epidural injection.  Improved sxs.      Has gained some weight since d/c.  Still lighter than approx 5 mo ago.      Still some leg swelling, but improved.  Some knee swelling noted on occas    Still sig fatigue.  Bp has been low at home.  90-low 100's/40's-70's.  Pt unsure what he is taking.      Patient's medications, allergies, past medical, surgical, social and family histories were reviewed and updated as appropriate.    ROS:  Review of Systems   Constitutional:  Positive for fatigue. Negative for fever.   Respiratory:  Positive for shortness of breath.    Cardiovascular:  Positive for leg swelling. Negative for chest pain and palpitations.           Hemoglobin A1C (%)   Date Value   2023 4.5       Past Medical History:   Diagnosis Date    Arthritis     Atrial fibrillation (HCC)     Hiatal hernia     Hypertrophy of prostate without urinary obstruction and other lower urinary tract symptoms (LUTS)     Kidney stone         Neoplasm of uncertain behavior of

## 2025-05-28 NOTE — TELEPHONE ENCOUNTER
Per review of chart-patient was asymptomatic with AF. On metoprolol and xarelto. Can be moved to [facility] next available.

## 2025-05-29 ENCOUNTER — RESULTS FOLLOW-UP (OUTPATIENT)
Dept: FAMILY MEDICINE CLINIC | Age: 75
End: 2025-05-29

## 2025-05-29 ENCOUNTER — TELEPHONE (OUTPATIENT)
Dept: FAMILY MEDICINE CLINIC | Age: 75
End: 2025-05-29

## 2025-05-29 DIAGNOSIS — I50.32 CHRONIC HEART FAILURE WITH PRESERVED EJECTION FRACTION (HCC): Primary | ICD-10-CM

## 2025-05-29 LAB
ANION GAP SERPL CALCULATED.3IONS-SCNC: 8 MMOL/L (ref 3–16)
BUN SERPL-MCNC: 20 MG/DL (ref 7–20)
CALCIUM SERPL-MCNC: 8.5 MG/DL (ref 8.3–10.6)
CHLORIDE SERPL-SCNC: 106 MMOL/L (ref 99–110)
CO2 SERPL-SCNC: 28 MMOL/L (ref 21–32)
CREAT SERPL-MCNC: 1 MG/DL (ref 0.8–1.3)
GFR SERPLBLD CREATININE-BSD FMLA CKD-EPI: 78 ML/MIN/{1.73_M2}
GLUCOSE SERPL-MCNC: 93 MG/DL (ref 70–99)
NT-PROBNP SERPL-MCNC: 1960 PG/ML (ref 0–449)
POTASSIUM SERPL-SCNC: 4.7 MMOL/L (ref 3.5–5.1)
SODIUM SERPL-SCNC: 142 MMOL/L (ref 136–145)

## 2025-05-29 RX ORDER — METOPROLOL SUCCINATE 50 MG/1
50 TABLET, EXTENDED RELEASE ORAL DAILY
COMMUNITY

## 2025-05-29 NOTE — TELEPHONE ENCOUNTER
Pls correct dose in chart.  If ongoing issues w/ hypotension, may need to adjust.  Rec cardio f/u as well

## 2025-05-29 NOTE — RESULT ENCOUNTER NOTE
BNP is still elevated.  Recommend sooner cardiology follow-up if possible.  Basic metabolic panel was normal.  I would recommend also trying to increase torsemide to 20 mg every other day, alternating with 10 mg.  Repeat BNP and BMP in 2 to 3 weeks impaired balance/decreased strength

## 2025-06-03 DIAGNOSIS — M47.816 SPONDYLOSIS OF LUMBAR REGION WITHOUT MYELOPATHY OR RADICULOPATHY: ICD-10-CM

## 2025-06-03 RX ORDER — HYDROCODONE BITARTRATE AND ACETAMINOPHEN 5; 325 MG/1; MG/1
1 TABLET ORAL EVERY 6 HOURS PRN
Qty: 30 TABLET | Refills: 0 | Status: SHIPPED | OUTPATIENT
Start: 2025-06-03 | End: 2025-07-03

## 2025-06-24 ENCOUNTER — OFFICE VISIT (OUTPATIENT)
Dept: FAMILY MEDICINE CLINIC | Age: 75
End: 2025-06-24
Payer: MEDICARE

## 2025-06-24 ENCOUNTER — RESULTS FOLLOW-UP (OUTPATIENT)
Dept: FAMILY MEDICINE CLINIC | Age: 75
End: 2025-06-24

## 2025-06-24 VITALS
SYSTOLIC BLOOD PRESSURE: 118 MMHG | DIASTOLIC BLOOD PRESSURE: 78 MMHG | HEIGHT: 71 IN | OXYGEN SATURATION: 95 % | RESPIRATION RATE: 16 BRPM | BODY MASS INDEX: 44.1 KG/M2 | WEIGHT: 315 LBS | TEMPERATURE: 97.6 F | HEART RATE: 89 BPM

## 2025-06-24 DIAGNOSIS — J04.0 LARYNGITIS: Primary | ICD-10-CM

## 2025-06-24 DIAGNOSIS — J34.89 SINUS DRAINAGE: ICD-10-CM

## 2025-06-24 DIAGNOSIS — R69 ILLNESS: ICD-10-CM

## 2025-06-24 LAB
INFLUENZA A ANTIGEN, POC: NEGATIVE
INFLUENZA B ANTIGEN, POC: NEGATIVE
LOT EXPIRE DATE: NORMAL
LOT KIT NUMBER: NORMAL
SARS-COV-2, POC: NORMAL
VALID INTERNAL CONTROL: NORMAL
VENDOR AND KIT NAME POC: NORMAL

## 2025-06-24 PROCEDURE — 1159F MED LIST DOCD IN RCRD: CPT | Performed by: FAMILY MEDICINE

## 2025-06-24 PROCEDURE — 87428 SARSCOV & INF VIR A&B AG IA: CPT | Performed by: FAMILY MEDICINE

## 2025-06-24 PROCEDURE — 1123F ACP DISCUSS/DSCN MKR DOCD: CPT | Performed by: FAMILY MEDICINE

## 2025-06-24 PROCEDURE — 99213 OFFICE O/P EST LOW 20 MIN: CPT | Performed by: FAMILY MEDICINE

## 2025-06-24 RX ORDER — FUROSEMIDE 20 MG/1
20 TABLET ORAL DAILY
COMMUNITY

## 2025-06-24 RX ORDER — TRAZODONE HYDROCHLORIDE 50 MG/1
TABLET ORAL
COMMUNITY

## 2025-06-24 RX ORDER — CHLORHEXIDINE GLUCONATE ORAL RINSE 1.2 MG/ML
SOLUTION DENTAL
COMMUNITY

## 2025-06-24 RX ORDER — TRIAMCINOLONE ACETONIDE 1 MG/G
CREAM TOPICAL
COMMUNITY

## 2025-06-24 RX ORDER — METHYLPREDNISOLONE 4 MG/1
TABLET ORAL
Qty: 1 KIT | Refills: 0 | Status: SHIPPED | OUTPATIENT
Start: 2025-06-24 | End: 2025-06-30

## 2025-06-24 RX ORDER — POLYETHYLENE GLYCOL 3350 17 G/17G
POWDER, FOR SOLUTION ORAL
COMMUNITY
Start: 2025-05-01 | End: 2025-08-07

## 2025-06-24 RX ORDER — BISACODYL 5 MG/1
TABLET, COATED ORAL
COMMUNITY

## 2025-06-24 ASSESSMENT — ENCOUNTER SYMPTOMS
SHORTNESS OF BREATH: 0
SINUS COMPLAINT: 1
SORE THROAT: 0
VOICE CHANGE: 1
SINUS PRESSURE: 1
COUGH: 1

## 2025-06-24 NOTE — PROGRESS NOTES
Chief Complaint   Patient presents with    Illness    Hoarse    Sinus Problem     drainage       HPI:  Sonu King III is a 74 y.o. (: 1950) here today   for   Illness  Associated symptoms include coughing (dry cough on occas). Pertinent negatives include no sore throat, fever or shortness of breath.   Sinus Problem  Associated symptoms include coughing (dry cough on occas) and sinus pressure. Pertinent negatives include no shortness of breath or sore throat.     Sxs began over the weekend.  Began w/ voice changes.  This am, sig sinus drainage as well.  Sinus pressure, sneezing.  No fevers.  No sig cough.  Sats ok.  Co-worker sick early last week.      Neg for covid.  Does not feel bad o/w.      Patient's medications, allergies, past medical, surgical, social and family histories were reviewed and updated as appropriate.    ROS:  Review of Systems   Constitutional:  Negative for fever.   HENT:  Positive for postnasal drip, sinus pressure and voice change. Negative for sore throat.    Respiratory:  Positive for cough (dry cough on occas). Negative for shortness of breath.            Hemoglobin A1C (%)   Date Value   2023 4.5       Past Medical History:   Diagnosis Date    Arthritis     Atrial fibrillation (HCC)     Hiatal hernia     Hypertrophy of prostate without urinary obstruction and other lower urinary tract symptoms (LUTS)     Kidney stone         Neoplasm of uncertain behavior of skin     Obesity, unspecified     Organic sleep apnea, unspecified        Family History   Problem Relation Age of Onset    Cancer Mother         breast met to bone    Emphysema Father     Cancer Maternal Uncle     Cancer Maternal Grandmother     Heart Failure Maternal Grandfather     Asthma Neg Hx     Diabetes Neg Hx     Hypertension Neg Hx        Social History     Socioeconomic History    Marital status:      Spouse name: Not on file    Number of children: Not on file    Years of education: Not on file

## 2025-06-30 NOTE — PROGRESS NOTES
[facility]   Electrophysiology Outpatient Note              Date:  July 8, 2025  Patient name: Sonu King III  YOB: 1950    Primary Care physician: Bill Carpio MD    HISTORY OF PRESENT ILLNESS: The patient is a 74 y.o.  male with a history of AF, nonobstructive CAD, obesity and untreated sleep apnea   In 6/2018, he had food poisoning and diarrhea x1 week and was found to be in atrial fibrillation by his PCP.  In 8/2018, he underwent cardioversion.  In 11/2018, atrial fibrillation was recurrent.  In 4/2019, he had a low risk stress test and Rythmol was started.  In 6/2019, patient underwent cardioversion but was found to be back in atrial fibrillation in 8/2019.  In 11/2019, he was admitted to start sotalol and underwent successful cardioversion.  In 12/2019, he had returned to symptomatic atrial fibrillation.  In 1/2020, he had an extensive RFCA of atrial fibrillation and Tikosyn was started.  In 3/2020, he complained of chest pain and cardiac CTA showed nonobstructive CAD.  In 3/2020, echo showed an EF of 55%.  In 4/2020, Tikosyn was stopped.  In 5/2020, event monitor showed PACs and PAT. at his last office visit in 9/2022, EKG showed atrial fibrillation.  Patient was asymptomatic.  He wore an event monitor that showed predominantly rate controlled atrial fibrillation.   At his last office visit in 3/2025, patient complained of bilateral lower extremity swelling.  An echocardiogram was ordered and this showed an EF of 55 to 60%, mild concentric hypertrophy, normal wall motion and was suggestive of increased left atrial pressure.  In 4/2025, patient was evaluated in the emergency department at [facility] for rapid atrial fibrillation.  He was transferred and admitted to [facility].  Echo was stable.  He was diuresed with IV Lasix.  His discharge weight was 298 pounds.  His weight today is 309 pounds.  Today he is being seen for

## 2025-07-08 ENCOUNTER — OFFICE VISIT (OUTPATIENT)
Dept: CARDIOLOGY CLINIC | Age: 75
End: 2025-07-08
Payer: MEDICARE

## 2025-07-08 VITALS
DIASTOLIC BLOOD PRESSURE: 80 MMHG | WEIGHT: 309.8 LBS | SYSTOLIC BLOOD PRESSURE: 124 MMHG | BODY MASS INDEX: 43.37 KG/M2 | HEIGHT: 71 IN | HEART RATE: 81 BPM | OXYGEN SATURATION: 96 %

## 2025-07-08 DIAGNOSIS — I50.32 CHRONIC HEART FAILURE WITH PRESERVED EJECTION FRACTION (HCC): ICD-10-CM

## 2025-07-08 DIAGNOSIS — I48.19 PERSISTENT ATRIAL FIBRILLATION (HCC): Primary | ICD-10-CM

## 2025-07-08 DIAGNOSIS — I48.91 ATRIAL FIBRILLATION WITH RVR (HCC): ICD-10-CM

## 2025-07-08 PROCEDURE — 1123F ACP DISCUSS/DSCN MKR DOCD: CPT | Performed by: NURSE PRACTITIONER

## 2025-07-08 PROCEDURE — 1159F MED LIST DOCD IN RCRD: CPT | Performed by: NURSE PRACTITIONER

## 2025-07-08 PROCEDURE — 1160F RVW MEDS BY RX/DR IN RCRD: CPT | Performed by: NURSE PRACTITIONER

## 2025-07-08 PROCEDURE — G2211 COMPLEX E/M VISIT ADD ON: HCPCS | Performed by: NURSE PRACTITIONER

## 2025-07-08 PROCEDURE — 93000 ELECTROCARDIOGRAM COMPLETE: CPT | Performed by: NURSE PRACTITIONER

## 2025-07-08 PROCEDURE — 99215 OFFICE O/P EST HI 40 MIN: CPT | Performed by: NURSE PRACTITIONER

## 2025-07-08 RX ORDER — FUROSEMIDE 20 MG/1
TABLET ORAL
Qty: 44 TABLET | Refills: 3 | Status: SHIPPED | COMMUNITY
Start: 2025-07-08

## 2025-07-08 RX ORDER — HYDROCODONE BITARTRATE AND ACETAMINOPHEN 5; 325 MG/1; MG/1
1 TABLET ORAL EVERY 6 HOURS PRN
COMMUNITY
Start: 2025-04-30 | End: 2025-08-07

## 2025-07-08 NOTE — PATIENT INSTRUCTIONS
Increase Lasix to 40 mg 4 days a week and 20 mg 3 days a week     Monitor for weight gain     Monitor BP at home and call if consistently out of goal ranges    Follow up with general cardiology     Follow up with me in about 9 months

## 2025-07-11 NOTE — TELEPHONE ENCOUNTER
Last Office Visit: 7/8/2025 Provider: ALDO  **Is provider OOT? yes    Next Office Visit: Visit date not found Provider: NPAM  **If no OV, when does pt need to be seen? in 9 month(s)  **Has patient already had 30 day supply? No    Lab orders needed? no   Encounter provider correct? Yes If not, change provider  Script changes since last refill? no    LAST LABS:   CBC:  Lab Results   Component Value Date    WBC 8.6 04/28/2025    HGB 15.8 04/28/2025    HCT 45.1 04/28/2025    MCV 89.0 04/28/2025     04/28/2025    LYMPHOPCT 19 04/25/2025    RBC 5.07 04/28/2025    MCH 31.2 04/28/2025    MCHC 35.0 04/28/2025    RDW 13.8 04/28/2025       BMP:  Lab Results   Component Value Date/Time     05/28/2025 02:34 PM    K 4.7 05/28/2025 02:34 PM    K 4.3 01/24/2020 03:42 PM     05/28/2025 02:34 PM    CO2 28 05/28/2025 02:34 PM    BUN 20 05/28/2025 02:34 PM    CREATININE 1.0 05/28/2025 02:34 PM    GLUCOSE 93 05/28/2025 02:34 PM    GLUCOSE 134 04/23/2025 05:44 PM    CALCIUM 8.5 05/28/2025 02:34 PM    LABGLOM 78 05/28/2025 02:34 PM    LABGLOM >60 02/16/2023 10:19 AM

## 2025-07-11 NOTE — TELEPHONE ENCOUNTER
Refill  Pt has 1 tablet left for today  rivaroxaban (XARELTO) 20 MG TABS tablet   [facility] [id number] - [place], [place] - [address] - P [phone] - F [phone]     Lov 7/8/25  Next-to return 9 mos

## 2025-07-15 PROBLEM — M70.62 TROCHANTERIC BURSITIS OF LEFT HIP: Status: ACTIVE | Noted: 2025-07-15

## 2025-07-17 DIAGNOSIS — M47.816 SPONDYLOSIS OF LUMBAR REGION WITHOUT MYELOPATHY OR RADICULOPATHY: ICD-10-CM

## 2025-07-17 RX ORDER — HYDROCODONE BITARTRATE AND ACETAMINOPHEN 5; 325 MG/1; MG/1
1 TABLET ORAL EVERY 6 HOURS PRN
Qty: 30 TABLET | Refills: 0 | Status: SHIPPED | OUTPATIENT
Start: 2025-07-17 | End: 2025-08-16

## 2025-08-19 RX ORDER — METOPROLOL SUCCINATE 50 MG/1
50 TABLET, EXTENDED RELEASE ORAL DAILY
Qty: 90 TABLET | Refills: 3 | Status: SHIPPED | OUTPATIENT
Start: 2025-08-19

## (undated) DEVICE — CHLORAPREP 26ML ORANGE

## (undated) DEVICE — GAUZE,SPONGE,4"X4",16PLY,STRL,LF,10/TRAY: Brand: MEDLINE

## (undated) DEVICE — STERILE POLYISOPRENE POWDER-FREE SURGICAL GLOVES: Brand: PROTEXIS

## (undated) DEVICE — TOWEL OR BLUEE 16X26IN ST 8 PACK ORB08 16X26ORTWL

## (undated) DEVICE — ALCOHOL RUBBING 16OZ 70% ISO

## (undated) DEVICE — UNIVERSAL BLOCK TRAY: Brand: MEDLINE INDUSTRIES, INC.